# Patient Record
Sex: MALE | Race: WHITE | URBAN - METROPOLITAN AREA
[De-identification: names, ages, dates, MRNs, and addresses within clinical notes are randomized per-mention and may not be internally consistent; named-entity substitution may affect disease eponyms.]

---

## 2017-01-01 ENCOUNTER — TELEPHONE (OUTPATIENT)
Dept: INTERNAL MEDICINE | Facility: CLINIC | Age: 52
End: 2017-01-01

## 2017-01-01 ENCOUNTER — OFFICE VISIT (OUTPATIENT)
Dept: INTERNAL MEDICINE | Facility: CLINIC | Age: 52
End: 2017-01-01
Payer: MEDICARE

## 2017-01-01 ENCOUNTER — ANTICOAGULATION THERAPY VISIT (OUTPATIENT)
Dept: NURSING | Facility: CLINIC | Age: 52
End: 2017-01-01
Payer: MEDICARE

## 2017-01-01 ENCOUNTER — OFFICE VISIT (OUTPATIENT)
Dept: PODIATRY | Facility: CLINIC | Age: 52
End: 2017-01-01
Payer: MEDICARE

## 2017-01-01 ENCOUNTER — TELEPHONE (OUTPATIENT)
Dept: FAMILY MEDICINE | Facility: CLINIC | Age: 52
End: 2017-01-01

## 2017-01-01 ENCOUNTER — NURSE TRIAGE (OUTPATIENT)
Dept: NURSING | Facility: CLINIC | Age: 52
End: 2017-01-01

## 2017-01-01 ENCOUNTER — ALLIED HEALTH/NURSE VISIT (OUTPATIENT)
Dept: FAMILY MEDICINE | Facility: CLINIC | Age: 52
End: 2017-01-01
Payer: MEDICARE

## 2017-01-01 ENCOUNTER — OFFICE VISIT (OUTPATIENT)
Dept: URGENT CARE | Facility: URGENT CARE | Age: 52
End: 2017-01-01
Payer: MEDICARE

## 2017-01-01 ENCOUNTER — TRANSFERRED RECORDS (OUTPATIENT)
Dept: HEALTH INFORMATION MANAGEMENT | Facility: CLINIC | Age: 52
End: 2017-01-01

## 2017-01-01 ENCOUNTER — RADIANT APPOINTMENT (OUTPATIENT)
Dept: GENERAL RADIOLOGY | Facility: CLINIC | Age: 52
End: 2017-01-01
Attending: INTERNAL MEDICINE
Payer: MEDICARE

## 2017-01-01 ENCOUNTER — CARE COORDINATION (OUTPATIENT)
Dept: CARE COORDINATION | Facility: CLINIC | Age: 52
End: 2017-01-01

## 2017-01-01 ENCOUNTER — MEDICAL CORRESPONDENCE (OUTPATIENT)
Dept: HEALTH INFORMATION MANAGEMENT | Facility: CLINIC | Age: 52
End: 2017-01-01

## 2017-01-01 ENCOUNTER — ALLIED HEALTH/NURSE VISIT (OUTPATIENT)
Dept: NURSING | Facility: CLINIC | Age: 52
End: 2017-01-01
Payer: MEDICARE

## 2017-01-01 VITALS
BODY MASS INDEX: 37.42 KG/M2 | OXYGEN SATURATION: 94 % | TEMPERATURE: 96.9 F | DIASTOLIC BLOOD PRESSURE: 67 MMHG | HEART RATE: 85 BPM | SYSTOLIC BLOOD PRESSURE: 96 MMHG | WEIGHT: 191.6 LBS

## 2017-01-01 VITALS
TEMPERATURE: 97.1 F | DIASTOLIC BLOOD PRESSURE: 62 MMHG | HEART RATE: 71 BPM | BODY MASS INDEX: 34.92 KG/M2 | SYSTOLIC BLOOD PRESSURE: 95 MMHG | OXYGEN SATURATION: 92 % | WEIGHT: 178.8 LBS

## 2017-01-01 VITALS — SYSTOLIC BLOOD PRESSURE: 116 MMHG | HEART RATE: 80 BPM | DIASTOLIC BLOOD PRESSURE: 70 MMHG

## 2017-01-01 VITALS — DIASTOLIC BLOOD PRESSURE: 60 MMHG | SYSTOLIC BLOOD PRESSURE: 96 MMHG | HEART RATE: 66 BPM

## 2017-01-01 VITALS
HEART RATE: 79 BPM | SYSTOLIC BLOOD PRESSURE: 73 MMHG | TEMPERATURE: 97 F | OXYGEN SATURATION: 88 % | DIASTOLIC BLOOD PRESSURE: 50 MMHG

## 2017-01-01 VITALS
BODY MASS INDEX: 39.22 KG/M2 | SYSTOLIC BLOOD PRESSURE: 91 MMHG | DIASTOLIC BLOOD PRESSURE: 62 MMHG | WEIGHT: 200.8 LBS | TEMPERATURE: 94.8 F | HEART RATE: 83 BPM | OXYGEN SATURATION: 88 %

## 2017-01-01 VITALS
OXYGEN SATURATION: 93 % | DIASTOLIC BLOOD PRESSURE: 68 MMHG | BODY MASS INDEX: 35.04 KG/M2 | SYSTOLIC BLOOD PRESSURE: 95 MMHG | HEART RATE: 81 BPM | WEIGHT: 179.4 LBS

## 2017-01-01 VITALS
SYSTOLIC BLOOD PRESSURE: 112 MMHG | DIASTOLIC BLOOD PRESSURE: 74 MMHG | OXYGEN SATURATION: 90 % | TEMPERATURE: 96.6 F | HEART RATE: 70 BPM | WEIGHT: 172.2 LBS | BODY MASS INDEX: 33.63 KG/M2

## 2017-01-01 VITALS
OXYGEN SATURATION: 95 % | BODY MASS INDEX: 34.1 KG/M2 | WEIGHT: 174.6 LBS | TEMPERATURE: 96.9 F | HEIGHT: 60 IN | DIASTOLIC BLOOD PRESSURE: 60 MMHG | BODY MASS INDEX: 34.36 KG/M2 | HEART RATE: 90 BPM | WEIGHT: 175 LBS | SYSTOLIC BLOOD PRESSURE: 96 MMHG | HEART RATE: 79 BPM

## 2017-01-01 VITALS
HEART RATE: 67 BPM | DIASTOLIC BLOOD PRESSURE: 84 MMHG | OXYGEN SATURATION: 95 % | TEMPERATURE: 96.2 F | SYSTOLIC BLOOD PRESSURE: 138 MMHG

## 2017-01-01 VITALS
SYSTOLIC BLOOD PRESSURE: 91 MMHG | TEMPERATURE: 96.8 F | OXYGEN SATURATION: 88 % | DIASTOLIC BLOOD PRESSURE: 60 MMHG | HEART RATE: 78 BPM | BODY MASS INDEX: 35.35 KG/M2 | WEIGHT: 181 LBS

## 2017-01-01 VITALS
DIASTOLIC BLOOD PRESSURE: 59 MMHG | OXYGEN SATURATION: 95 % | WEIGHT: 195 LBS | SYSTOLIC BLOOD PRESSURE: 97 MMHG | HEART RATE: 87 BPM | BODY MASS INDEX: 38.08 KG/M2 | TEMPERATURE: 96.7 F

## 2017-01-01 VITALS — HEART RATE: 80 BPM | OXYGEN SATURATION: 90 % | WEIGHT: 172 LBS | BODY MASS INDEX: 33.59 KG/M2

## 2017-01-01 VITALS
HEART RATE: 82 BPM | BODY MASS INDEX: 33.98 KG/M2 | WEIGHT: 174 LBS | DIASTOLIC BLOOD PRESSURE: 74 MMHG | SYSTOLIC BLOOD PRESSURE: 114 MMHG

## 2017-01-01 VITALS
WEIGHT: 176.2 LBS | BODY MASS INDEX: 34.41 KG/M2 | DIASTOLIC BLOOD PRESSURE: 66 MMHG | HEART RATE: 86 BPM | TEMPERATURE: 96.7 F | SYSTOLIC BLOOD PRESSURE: 100 MMHG

## 2017-01-01 DIAGNOSIS — J96.22 ACUTE ON CHRONIC RESPIRATORY FAILURE WITH HYPOXIA AND HYPERCAPNIA (H): Primary | ICD-10-CM

## 2017-01-01 DIAGNOSIS — I26.99 PULMONARY EMBOLISM AND INFARCTION (H): ICD-10-CM

## 2017-01-01 DIAGNOSIS — Z79.01 LONG-TERM (CURRENT) USE OF ANTICOAGULANTS: ICD-10-CM

## 2017-01-01 DIAGNOSIS — I27.20 PULMONARY HYPERTENSION (H): Chronic | ICD-10-CM

## 2017-01-01 DIAGNOSIS — I27.20 PULMONARY HYPERTENSION (H): ICD-10-CM

## 2017-01-01 DIAGNOSIS — L97.929 VENOUS ULCER OF LEFT LEG (H): ICD-10-CM

## 2017-01-01 DIAGNOSIS — J96.22 ACUTE ON CHRONIC RESPIRATORY FAILURE WITH HYPOXIA AND HYPERCAPNIA (H): ICD-10-CM

## 2017-01-01 DIAGNOSIS — Z79.01 LONG TERM (CURRENT) USE OF ANTICOAGULANTS: ICD-10-CM

## 2017-01-01 DIAGNOSIS — Z79.01 LONG-TERM (CURRENT) USE OF ANTICOAGULANTS: Primary | ICD-10-CM

## 2017-01-01 DIAGNOSIS — E66.2 HYPOVENTILATION ASSOCIATED WITH OBESITY (H): ICD-10-CM

## 2017-01-01 DIAGNOSIS — I50.810 RIGHT-SIDED HEART FAILURE (H): ICD-10-CM

## 2017-01-01 DIAGNOSIS — J96.21 ACUTE ON CHRONIC RESPIRATORY FAILURE WITH HYPOXIA AND HYPERCAPNIA (H): ICD-10-CM

## 2017-01-01 DIAGNOSIS — I50.810 RIGHT-SIDED HEART FAILURE (H): Primary | ICD-10-CM

## 2017-01-01 DIAGNOSIS — I26.99 RECURRENT PULMONARY EMBOLISM (H): ICD-10-CM

## 2017-01-01 DIAGNOSIS — L97.909 VENOUS STASIS ULCERS, UNSPECIFIED LATERALITY: ICD-10-CM

## 2017-01-01 DIAGNOSIS — J40 BRONCHITIS: Primary | ICD-10-CM

## 2017-01-01 DIAGNOSIS — J47.9 ACQUIRED BRONCHIECTASIS (H): ICD-10-CM

## 2017-01-01 DIAGNOSIS — K21.9 GASTROESOPHAGEAL REFLUX DISEASE, ESOPHAGITIS PRESENCE NOT SPECIFIED: Chronic | ICD-10-CM

## 2017-01-01 DIAGNOSIS — Q90.9 TRISOMY 21 SYNDROME: Chronic | ICD-10-CM

## 2017-01-01 DIAGNOSIS — R79.1 ELEVATED INR: Primary | ICD-10-CM

## 2017-01-01 DIAGNOSIS — I89.0 LYMPHEDEMA: ICD-10-CM

## 2017-01-01 DIAGNOSIS — I48.20 CHRONIC ATRIAL FIBRILLATION (H): ICD-10-CM

## 2017-01-01 DIAGNOSIS — I10 HYPERTENSION, BENIGN: Primary | Chronic | ICD-10-CM

## 2017-01-01 DIAGNOSIS — E66.2 HYPOVENTILATION ASSOCIATED WITH OBESITY (H): Chronic | ICD-10-CM

## 2017-01-01 DIAGNOSIS — I50.23 ACUTE ON CHRONIC SYSTOLIC HEART FAILURE (H): Primary | ICD-10-CM

## 2017-01-01 DIAGNOSIS — B35.1 DERMATOPHYTOSIS OF NAIL: ICD-10-CM

## 2017-01-01 DIAGNOSIS — G47.33 OBSTRUCTIVE SLEEP APNEA: Chronic | ICD-10-CM

## 2017-01-01 DIAGNOSIS — L84 CORNS AND CALLOSITIES: Primary | ICD-10-CM

## 2017-01-01 DIAGNOSIS — I50.33 ACUTE ON CHRONIC DIASTOLIC CONGESTIVE HEART FAILURE (H): Primary | ICD-10-CM

## 2017-01-01 DIAGNOSIS — E78.5 HYPERLIPIDEMIA LDL GOAL <130: Chronic | ICD-10-CM

## 2017-01-01 DIAGNOSIS — I83.029 VENOUS ULCER OF LEFT LEG (H): ICD-10-CM

## 2017-01-01 DIAGNOSIS — R06.89 DECREASED LUNG SOUNDS: ICD-10-CM

## 2017-01-01 DIAGNOSIS — D69.6 THROMBOCYTOPENIA (H): ICD-10-CM

## 2017-01-01 DIAGNOSIS — I83.009 VENOUS STASIS ULCERS, UNSPECIFIED LATERALITY: ICD-10-CM

## 2017-01-01 DIAGNOSIS — D68.59 PRIMARY HYPERCOAGULABLE STATE (H): ICD-10-CM

## 2017-01-01 DIAGNOSIS — I10 HYPERTENSION, BENIGN: Chronic | ICD-10-CM

## 2017-01-01 DIAGNOSIS — R60.0 BILATERAL LOWER EXTREMITY EDEMA: Primary | ICD-10-CM

## 2017-01-01 DIAGNOSIS — Z01.30 BP CHECK: Primary | ICD-10-CM

## 2017-01-01 DIAGNOSIS — J47.9 ACQUIRED BRONCHIECTASIS (H): Chronic | ICD-10-CM

## 2017-01-01 DIAGNOSIS — R79.1 SUPRATHERAPEUTIC INR: Primary | ICD-10-CM

## 2017-01-01 DIAGNOSIS — R17 ELEVATED BILIRUBIN: ICD-10-CM

## 2017-01-01 DIAGNOSIS — Z79.01 LONG-TERM (CURRENT) USE OF ANTICOAGULANTS, INR GOAL 2.0-3.0: ICD-10-CM

## 2017-01-01 DIAGNOSIS — Z99.81 DEPENDENCE ON SUPPLEMENTAL OXYGEN: ICD-10-CM

## 2017-01-01 DIAGNOSIS — M10.00 IDIOPATHIC GOUT, UNSPECIFIED CHRONICITY, UNSPECIFIED SITE: ICD-10-CM

## 2017-01-01 DIAGNOSIS — J96.21 ACUTE ON CHRONIC RESPIRATORY FAILURE WITH HYPOXIA AND HYPERCAPNIA (H): Primary | ICD-10-CM

## 2017-01-01 DIAGNOSIS — D69.6 THROMBOCYTOPENIA (H): Primary | ICD-10-CM

## 2017-01-01 DIAGNOSIS — I50.23 ACUTE ON CHRONIC SYSTOLIC HEART FAILURE (H): ICD-10-CM

## 2017-01-01 DIAGNOSIS — Z99.81 ON SUPPLEMENTAL OXYGEN THERAPY: ICD-10-CM

## 2017-01-01 DIAGNOSIS — R79.1 ELEVATED INR: ICD-10-CM

## 2017-01-01 DIAGNOSIS — I82.499 DEEP VEIN THROMBOSIS (DVT) OF OTHER VEIN OF LOWER EXTREMITY: Chronic | ICD-10-CM

## 2017-01-01 DIAGNOSIS — I95.9 HYPOTENSION, UNSPECIFIED HYPOTENSION TYPE: ICD-10-CM

## 2017-01-01 LAB
ALBUMIN SERPL-MCNC: 2.1 G/DL (ref 3.4–5)
ALBUMIN SERPL-MCNC: 2.2 G/DL (ref 3.4–5)
ALP SERPL-CCNC: 222 U/L (ref 40–150)
ALP SERPL-CCNC: 223 U/L (ref 40–150)
ALT SERPL W P-5'-P-CCNC: 15 U/L (ref 0–70)
ALT SERPL W P-5'-P-CCNC: 16 U/L (ref 0–70)
ALT SERPL W P-5'-P-CCNC: 20 U/L (ref 0–70)
ANION GAP SERPL CALCULATED.3IONS-SCNC: 5 MMOL/L (ref 3–14)
ANION GAP SERPL CALCULATED.3IONS-SCNC: 7 MMOL/L (ref 3–14)
ANION GAP SERPL CALCULATED.3IONS-SCNC: 8 MMOL/L (ref 3–14)
ANION GAP SERPL CALCULATED.3IONS-SCNC: 8 MMOL/L (ref 3–14)
ANISOCYTOSIS BLD QL SMEAR: ABNORMAL
AST SERPL W P-5'-P-CCNC: 27 U/L (ref 0–45)
AST SERPL W P-5'-P-CCNC: 28 U/L (ref 0–45)
BILIRUB DIRECT SERPL-MCNC: 0.9 MG/DL (ref 0–0.2)
BILIRUB DIRECT SERPL-MCNC: 1.1 MG/DL (ref 0–0.2)
BILIRUB SERPL-MCNC: 1.6 MG/DL (ref 0.2–1.3)
BILIRUB SERPL-MCNC: 1.8 MG/DL (ref 0.2–1.3)
BUN SERPL-MCNC: 26 MG/DL (ref 7–30)
BUN SERPL-MCNC: 26 MG/DL (ref 7–30)
BUN SERPL-MCNC: 29 MG/DL (ref 7–30)
BUN SERPL-MCNC: 30 MG/DL (ref 7–30)
BUN SERPL-MCNC: 32 MG/DL (ref 7–30)
BUN SERPL-MCNC: 34 MG/DL (ref 7–30)
CALCIUM SERPL-MCNC: 7.9 MG/DL (ref 8.5–10.1)
CALCIUM SERPL-MCNC: 8 MG/DL (ref 8.5–10.1)
CALCIUM SERPL-MCNC: 8.5 MG/DL (ref 8.5–10.1)
CALCIUM SERPL-MCNC: 8.5 MG/DL (ref 8.5–10.1)
CALCIUM SERPL-MCNC: 8.6 MG/DL (ref 8.5–10.1)
CALCIUM SERPL-MCNC: 9 MG/DL (ref 8.5–10.1)
CHLORIDE SERPL-SCNC: 100 MMOL/L (ref 94–109)
CHLORIDE SERPL-SCNC: 99 MMOL/L (ref 94–109)
CO2 SERPL-SCNC: 27 MMOL/L (ref 20–32)
CO2 SERPL-SCNC: 30 MMOL/L (ref 20–32)
CO2 SERPL-SCNC: 33 MMOL/L (ref 20–32)
CO2 SERPL-SCNC: 35 MMOL/L (ref 20–32)
CREAT SERPL-MCNC: 1.32 MG/DL (ref 0.66–1.25)
CREAT SERPL-MCNC: 1.36 MG/DL (ref 0.66–1.25)
CREAT SERPL-MCNC: 1.47 MG/DL (ref 0.66–1.25)
CREAT SERPL-MCNC: 1.5 MG/DL (ref 0.72–1.25)
CREAT SERPL-MCNC: 1.53 MG/DL (ref 0.66–1.25)
CREAT SERPL-MCNC: 1.55 MG/DL (ref 0.66–1.25)
CREAT SERPL-MCNC: 1.58 MG/DL (ref 0.66–1.25)
DIFFERENTIAL METHOD BLD: ABNORMAL
EJECTION FRACTION: 65
EOSINOPHIL # BLD AUTO: 0.1 10E9/L (ref 0–0.7)
EOSINOPHIL NFR BLD AUTO: 1 %
ERYTHROCYTE [DISTWIDTH] IN BLOOD BY AUTOMATED COUNT: 23.4 % (ref 10–15)
ERYTHROCYTE [DISTWIDTH] IN BLOOD BY AUTOMATED COUNT: 24 % (ref 10–15)
GFR SERPL CREATININE-BSD FRML MDRD: 46 ML/MIN/1.7M2
GFR SERPL CREATININE-BSD FRML MDRD: 47 ML/MIN/1.7M2
GFR SERPL CREATININE-BSD FRML MDRD: 48 ML/MIN/1.7M2
GFR SERPL CREATININE-BSD FRML MDRD: 49 ML/MIN/1.73M2
GFR SERPL CREATININE-BSD FRML MDRD: 50 ML/MIN/1.7M2
GFR SERPL CREATININE-BSD FRML MDRD: 55 ML/MIN/1.7M2
GFR SERPL CREATININE-BSD FRML MDRD: 57 ML/MIN/1.7M2
GLUCOSE SERPL-MCNC: 62 MG/DL (ref 70–99)
GLUCOSE SERPL-MCNC: 75 MG/DL (ref 70–99)
GLUCOSE SERPL-MCNC: 78 MG/DL (ref 70–99)
GLUCOSE SERPL-MCNC: 81 MG/DL (ref 65–100)
GLUCOSE SERPL-MCNC: 82 MG/DL (ref 70–99)
GLUCOSE SERPL-MCNC: 82 MG/DL (ref 70–99)
GLUCOSE SERPL-MCNC: 96 MG/DL (ref 70–99)
HCT VFR BLD AUTO: 42.9 % (ref 40–53)
HCT VFR BLD AUTO: 44.6 % (ref 40–53)
HGB BLD-MCNC: 13.7 G/DL (ref 13.3–17.7)
HGB BLD-MCNC: 14.2 G/DL (ref 13.3–17.7)
INR POINT OF CARE: 1.8 (ref 0.86–1.14)
INR POINT OF CARE: 1.9 (ref 0.86–1.14)
INR POINT OF CARE: 2 (ref 0.86–1.14)
INR POINT OF CARE: 2.1 (ref 0.86–1.14)
INR POINT OF CARE: 2.4 (ref 0.86–1.14)
INR POINT OF CARE: 2.5 (ref 0.86–1.14)
INR POINT OF CARE: 2.7 (ref 0.86–1.14)
INR POINT OF CARE: 2.9 (ref 0.86–1.14)
INR POINT OF CARE: 3 (ref 0.86–1.14)
INR POINT OF CARE: 3.1 (ref 0.86–1.14)
INR POINT OF CARE: 3.1 (ref 0.86–1.14)
INR POINT OF CARE: 3.2 (ref 0.86–1.14)
INR POINT OF CARE: 3.2 (ref 0.86–1.14)
INR POINT OF CARE: 3.3 (ref 0.86–1.14)
INR POINT OF CARE: 3.4 (ref 0.86–1.14)
INR POINT OF CARE: 3.5 (ref 0.86–1.14)
INR POINT OF CARE: 3.7 (ref 0.86–1.14)
INR POINT OF CARE: 3.8 (ref 0.86–1.14)
INR POINT OF CARE: 3.8 (ref 0.86–1.14)
INR POINT OF CARE: 4.5 (ref 0.86–1.14)
INR POINT OF CARE: 7 (ref 0.86–1.14)
INR POINT OF CARE: 9.14 (ref 0.86–1.14)
INR POINT OF CARE: NORMAL (ref 0.86–1.14)
INR PPP: 3
INR PPP: 9.14 (ref 0.86–1.14)
LDLC SERPL DIRECT ASSAY-MCNC: 54 MG/DL
LYMPHOCYTES # BLD AUTO: 1 10E9/L (ref 0.8–5.3)
LYMPHOCYTES NFR BLD AUTO: 14 %
MACROCYTES BLD QL SMEAR: PRESENT
MCH RBC QN AUTO: 30.1 PG (ref 26.5–33)
MCH RBC QN AUTO: 30.6 PG (ref 26.5–33)
MCHC RBC AUTO-ENTMCNC: 31.8 G/DL (ref 31.5–36.5)
MCHC RBC AUTO-ENTMCNC: 31.9 G/DL (ref 31.5–36.5)
MCV RBC AUTO: 95 FL (ref 78–100)
MCV RBC AUTO: 96 FL (ref 78–100)
MONOCYTES # BLD AUTO: 0.6 10E9/L (ref 0–1.3)
MONOCYTES NFR BLD AUTO: 9 %
NEUTROPHILS # BLD AUTO: 5.4 10E9/L (ref 1.6–8.3)
NEUTROPHILS NFR BLD AUTO: 76 %
NT-PROBNP SERPL-MCNC: 2233 PG/ML (ref 0–125)
NT-PROBNP SERPL-MCNC: 2353 PG/ML (ref 0–125)
NT-PROBNP SERPL-MCNC: 2695 PG/ML (ref 0–125)
NT-PROBNP SERPL-MCNC: 3061 PG/ML (ref 0–125)
NT-PROBNP SERPL-MCNC: 3557 PG/ML (ref 0–125)
PLATELET # BLD AUTO: 117 10E9/L (ref 150–450)
PLATELET # BLD AUTO: 121 10E9/L (ref 150–450)
POLYCHROMASIA BLD QL SMEAR: ABNORMAL
POTASSIUM SERPL-SCNC: 3.6 MMOL/L (ref 3.4–5.3)
POTASSIUM SERPL-SCNC: 3.8 MMOL/L (ref 3.4–5.3)
POTASSIUM SERPL-SCNC: 3.8 MMOL/L (ref 3.4–5.3)
POTASSIUM SERPL-SCNC: 3.9 MMOL/L (ref 3.5–5)
POTASSIUM SERPL-SCNC: 4 MMOL/L (ref 3.4–5.3)
POTASSIUM SERPL-SCNC: 4.1 MMOL/L (ref 3.4–5.3)
POTASSIUM SERPL-SCNC: 4.5 MMOL/L (ref 3.4–5.3)
PROT SERPL-MCNC: 7.6 G/DL (ref 6.8–8.8)
PROT SERPL-MCNC: 7.7 G/DL (ref 6.8–8.8)
RBC # BLD AUTO: 4.48 10E12/L (ref 4.4–5.9)
RBC # BLD AUTO: 4.71 10E12/L (ref 4.4–5.9)
SODIUM SERPL-SCNC: 134 MMOL/L (ref 133–144)
SODIUM SERPL-SCNC: 136 MMOL/L (ref 133–144)
SODIUM SERPL-SCNC: 137 MMOL/L (ref 133–144)
SODIUM SERPL-SCNC: 137 MMOL/L (ref 133–144)
SODIUM SERPL-SCNC: 139 MMOL/L (ref 133–144)
SODIUM SERPL-SCNC: 139 MMOL/L (ref 133–144)
VARIANT LYMPHS BLD QL SMEAR: PRESENT
WBC # BLD AUTO: 3.7 10E9/L (ref 4–11)
WBC # BLD AUTO: 7.1 10E9/L (ref 4–11)

## 2017-01-01 PROCEDURE — 85610 PROTHROMBIN TIME: CPT | Mod: QW

## 2017-01-01 PROCEDURE — 99207 ZZC NO CHARGE NURSE ONLY: CPT

## 2017-01-01 PROCEDURE — 99213 OFFICE O/P EST LOW 20 MIN: CPT | Performed by: INTERNAL MEDICINE

## 2017-01-01 PROCEDURE — 36416 COLLJ CAPILLARY BLOOD SPEC: CPT

## 2017-01-01 PROCEDURE — 83880 ASSAY OF NATRIURETIC PEPTIDE: CPT | Performed by: INTERNAL MEDICINE

## 2017-01-01 PROCEDURE — 99214 OFFICE O/P EST MOD 30 MIN: CPT | Mod: 25 | Performed by: INTERNAL MEDICINE

## 2017-01-01 PROCEDURE — 85610 PROTHROMBIN TIME: CPT | Performed by: INTERNAL MEDICINE

## 2017-01-01 PROCEDURE — 83721 ASSAY OF BLOOD LIPOPROTEIN: CPT | Performed by: INTERNAL MEDICINE

## 2017-01-01 PROCEDURE — 11721 DEBRIDE NAIL 6 OR MORE: CPT | Mod: 59 | Performed by: PODIATRIST

## 2017-01-01 PROCEDURE — 80048 BASIC METABOLIC PNL TOTAL CA: CPT | Performed by: INTERNAL MEDICINE

## 2017-01-01 PROCEDURE — 85025 COMPLETE CBC W/AUTO DIFF WBC: CPT | Performed by: INTERNAL MEDICINE

## 2017-01-01 PROCEDURE — 99214 OFFICE O/P EST MOD 30 MIN: CPT | Performed by: INTERNAL MEDICINE

## 2017-01-01 PROCEDURE — 85027 COMPLETE CBC AUTOMATED: CPT | Performed by: INTERNAL MEDICINE

## 2017-01-01 PROCEDURE — 99215 OFFICE O/P EST HI 40 MIN: CPT | Performed by: INTERNAL MEDICINE

## 2017-01-01 PROCEDURE — 84460 ALANINE AMINO (ALT) (SGPT): CPT | Performed by: INTERNAL MEDICINE

## 2017-01-01 PROCEDURE — 99207 ZZC NO CHARGE NURSE ONLY: CPT | Performed by: INTERNAL MEDICINE

## 2017-01-01 PROCEDURE — 36415 COLL VENOUS BLD VENIPUNCTURE: CPT | Performed by: INTERNAL MEDICINE

## 2017-01-01 PROCEDURE — 11056 PARNG/CUTG B9 HYPRKR LES 2-4: CPT | Mod: GA | Performed by: PODIATRIST

## 2017-01-01 PROCEDURE — 80076 HEPATIC FUNCTION PANEL: CPT | Performed by: INTERNAL MEDICINE

## 2017-01-01 PROCEDURE — 99214 OFFICE O/P EST MOD 30 MIN: CPT | Performed by: FAMILY MEDICINE

## 2017-01-01 PROCEDURE — 71020 XR CHEST 2 VW: CPT

## 2017-01-01 RX ORDER — PHYTONADIONE 5 MG/1
5 TABLET ORAL ONCE
Qty: 1 TABLET | Refills: 0 | Status: SHIPPED | OUTPATIENT
Start: 2017-01-01 | End: 2017-01-01

## 2017-01-01 RX ORDER — ACETAZOLAMIDE 250 MG/1
250 TABLET ORAL DAILY
Qty: 90 TABLET | Refills: 0 | Status: SHIPPED | OUTPATIENT
Start: 2017-01-01

## 2017-01-01 RX ORDER — SILDENAFIL CITRATE 20 MG/1
20 TABLET ORAL 3 TIMES DAILY
Qty: 180 TABLET | Refills: 1 | Status: SHIPPED | OUTPATIENT
Start: 2017-01-01

## 2017-01-01 RX ORDER — BUMETANIDE 2 MG/1
2 TABLET ORAL 2 TIMES DAILY
Qty: 14 TABLET | Refills: 0 | Status: SHIPPED | OUTPATIENT
Start: 2017-01-01 | End: 2017-01-01

## 2017-01-01 RX ORDER — POTASSIUM CHLORIDE 1.5 G/1.58G
20 POWDER, FOR SOLUTION ORAL 2 TIMES DAILY
Qty: 180 TABLET | Refills: 0 | Status: SHIPPED | OUTPATIENT
Start: 2017-01-01 | End: 2017-01-01

## 2017-01-01 RX ORDER — ACETAZOLAMIDE 250 MG/1
250 TABLET ORAL DAILY
Qty: 7 TABLET | Refills: 0 | Status: SHIPPED | OUTPATIENT
Start: 2017-01-01 | End: 2017-01-01

## 2017-01-01 RX ORDER — SILDENAFIL CITRATE 20 MG/1
TABLET ORAL
Qty: 270 TABLET | Refills: 1 | OUTPATIENT
Start: 2017-01-01

## 2017-01-01 RX ORDER — SILDENAFIL CITRATE 20 MG/1
20 TABLET ORAL 3 TIMES DAILY
Qty: 21 TABLET | Refills: 0 | Status: SHIPPED | OUTPATIENT
Start: 2017-01-01 | End: 2017-01-01

## 2017-01-01 RX ORDER — BUMETANIDE 2 MG/1
TABLET ORAL
Qty: 180 TABLET | Refills: 1 | Status: SHIPPED | OUTPATIENT
Start: 2017-01-01 | End: 2017-01-01

## 2017-01-01 RX ORDER — SIMVASTATIN 20 MG
TABLET ORAL
Qty: 39 TABLET | Refills: 3 | Status: SHIPPED | OUTPATIENT
Start: 2017-01-01

## 2017-01-01 RX ORDER — ALLOPURINOL 300 MG/1
1 TABLET ORAL DAILY
Qty: 7 TABLET | Refills: 0 | Status: SHIPPED | OUTPATIENT
Start: 2017-01-01

## 2017-01-01 RX ORDER — WARFARIN SODIUM 3 MG/1
TABLET ORAL
Qty: 150 TABLET | Refills: 1 | COMMUNITY
Start: 2017-01-01

## 2017-01-01 RX ORDER — OMEPRAZOLE 40 MG/1
CAPSULE, DELAYED RELEASE ORAL
Qty: 90 CAPSULE | Refills: 3 | Status: SHIPPED | OUTPATIENT
Start: 2017-01-01

## 2017-01-01 RX ORDER — WARFARIN SODIUM 3 MG/1
TABLET ORAL
Qty: 90 TABLET | Refills: 0 | Status: SHIPPED | OUTPATIENT
Start: 2017-01-01 | End: 2017-01-01 | Stop reason: DRUGHIGH

## 2017-01-01 RX ORDER — WARFARIN SODIUM 3 MG/1
TABLET ORAL
Qty: 150 TABLET | Refills: 1 | Status: SHIPPED | OUTPATIENT
Start: 2017-01-01 | End: 2017-01-01

## 2017-01-01 RX ORDER — PREDNISONE 20 MG/1
40 TABLET ORAL DAILY
Qty: 10 TABLET | Refills: 0 | Status: SHIPPED | OUTPATIENT
Start: 2017-01-01 | End: 2017-01-01

## 2017-01-01 RX ORDER — WARFARIN SODIUM 3 MG/1
TABLET ORAL
Qty: 14 TABLET | Refills: 0 | Status: SHIPPED | OUTPATIENT
Start: 2017-01-01 | End: 2017-01-01

## 2017-01-01 RX ORDER — POTASSIUM CHLORIDE 1500 MG/1
TABLET, EXTENDED RELEASE ORAL
Qty: 60 TABLET | Refills: 0 | Status: SHIPPED | OUTPATIENT
Start: 2017-01-01

## 2017-01-01 RX ORDER — WARFARIN SODIUM 3 MG/1
TABLET ORAL
Qty: 80 TABLET | Refills: 0 | Status: SHIPPED | OUTPATIENT
Start: 2017-01-01 | End: 2017-01-01

## 2017-01-01 RX ORDER — SIMVASTATIN 20 MG
20 TABLET ORAL
Qty: 3 TABLET | Refills: 0 | Status: SHIPPED | OUTPATIENT
Start: 2017-01-01 | End: 2017-01-01 | Stop reason: DRUGHIGH

## 2017-01-01 RX ORDER — POTASSIUM CHLORIDE 1500 MG/1
TABLET, EXTENDED RELEASE ORAL
Qty: 180 TABLET | Refills: 0 | OUTPATIENT
Start: 2017-01-01

## 2017-01-01 RX ORDER — POTASSIUM CHLORIDE 1.5 G/1.58G
20 POWDER, FOR SOLUTION ORAL 2 TIMES DAILY
Qty: 14 TABLET | Refills: 0 | Status: SHIPPED | OUTPATIENT
Start: 2017-01-01 | End: 2017-01-01

## 2017-01-01 RX ORDER — OMEPRAZOLE 40 MG/1
CAPSULE, DELAYED RELEASE ORAL
Qty: 7 CAPSULE | Refills: 0 | Status: SHIPPED | OUTPATIENT
Start: 2017-01-01 | End: 2017-01-01

## 2017-01-01 RX ORDER — LEVOFLOXACIN 750 MG/1
750 TABLET, FILM COATED ORAL DAILY
Qty: 5 TABLET | Refills: 0 | Status: SHIPPED | OUTPATIENT
Start: 2017-01-01 | End: 2017-01-01 | Stop reason: DRUGHIGH

## 2017-01-01 RX ORDER — WARFARIN SODIUM 3 MG/1
TABLET ORAL
Qty: 30 TABLET | Refills: 0 | Status: SHIPPED | OUTPATIENT
Start: 2017-01-01 | End: 2017-01-01

## 2017-01-01 RX ORDER — AMOXICILLIN 500 MG/1
CAPSULE ORAL
Qty: 21 CAPSULE | Refills: 0 | Status: SHIPPED | OUTPATIENT
Start: 2017-01-01 | End: 2017-01-01

## 2017-01-01 RX ORDER — LEVOFLOXACIN 250 MG/1
250 TABLET, FILM COATED ORAL DAILY
Qty: 7 TABLET | Refills: 0 | Status: SHIPPED | OUTPATIENT
Start: 2017-01-01 | End: 2017-01-01

## 2017-01-01 RX ORDER — AMOXICILLIN 500 MG/1
CAPSULE ORAL
Qty: 42 CAPSULE | Refills: 1 | Status: SHIPPED | OUTPATIENT
Start: 2017-01-01

## 2017-01-01 RX ORDER — BUMETANIDE 2 MG/1
2 TABLET ORAL 2 TIMES DAILY
Qty: 180 TABLET | Refills: 1
Start: 2017-01-01

## 2017-01-04 NOTE — PROGRESS NOTES
S:  Patient here for routine care and signed the ABN before seeing us today.    O:  On exam, all the nails are thickened, elongated, discolored, with subungual debris.  Bilateral hallux IPJ calluses     A:  Onychomycosis       Calluses x 2    P:  ABN signed.  Nails debrided.  Calluses debrided with a 15 blade.  RETURN TO CLINIC 3 months    JIMMY YUN DPM, FACFAS

## 2017-01-04 NOTE — PATIENT INSTRUCTIONS
We wish you continued good healing. If you have any questions or concerns, please do not hesitate to contact us at [phone].      Please remember to call and schedule a follow up appointment if one was recommended at your earliest convenience.   PODIATRY CLINIC HOURS  TELEPHONE NUMBER    Dr. Robert Webster D.P.M [facility]    Clinics:  [facility]        Briana Dyson MA  Medical Assistant  Tuesday 1PM-6PM  [facility]  Wednesday 7AM-2PM  [place]  Thursday 10AM-6PM  [place] Friday 7AM-345PM  [place]  Specialty schedulers:   [phone] to make an appointment with any Specialty Provider.        Urgent Care locations:    [facility] Monday-Friday 5 pm - 9 pm. Saturday-Sunday 9 am -5pm    Monday-Friday 11 am - 9 pm Saturday 9 am - 5 pm     Monday-Sunday 12 noon-8PM [phone]     If you need a medication refill, please contact us you may need lab work and/or a follow up visit prior to your refill (i.e. Antifungal medications).    [employer] (secure e-mail communication and access to your chart) to send a message or to make an appointment.    If MRI needed please call Aaron Martin at [phone]        Weight management plan: Patient was referred to their PCP to discuss a diet and exercise plan.

## 2017-01-04 NOTE — NURSING NOTE
Chief Complaint   Patient presents with     Toenail     due for trim       Initial Pulse 90  Ht 1.524 m (5')  Wt 79.379 kg (175 lb)  BMI 34.18 kg/m2  SpO2  Estimated body mass index is 34.18 kg/(m^2) as calculated from the following:    Height as of this encounter: 1.524 m (5').    Weight as of this encounter: 79.379 kg (175 lb).  BP completed using cuff size: NA (Not Taken)

## 2017-01-11 PROBLEM — Z01.30 BP CHECK: Status: ACTIVE | Noted: 2017-01-01

## 2017-01-11 NOTE — PROGRESS NOTES
Sonny Vivar was evaluated in a [facility] on January 11, 2017 at which time his blood pressure was:    BP Readings from Last 3 Encounters:   01/11/17 96/60   12/28/16 96/66   12/19/16 85/51       Reviewed lifestyle modifications for blood pressure control and reduction: including making healthy food choices, managing weight, getting regular exercise, smoking cessation, reducing alcohol consumption, monitoring blood pressure regularly.     Sonny Vivar is not experiencing symptoms.    Follow-Up: BP is at goal of < 140/90mmHg (patient 18+ years of age with or without diabetes).  Recommended follow-up at pharmacy in 6 months.     Completed by:  Jp Lee RPh.  [facility]  [phone]

## 2017-01-11 NOTE — MR AVS SNAPSHOT
After Visit Summary   1/11/2017    Sonny Vivar    MRN: [id number]           Patient Information     Date Of Birth          1965        Visit Information        Provider Department      1/11/2017 3:37 PM Genna Balderas MD [facility]        Today's Diagnoses     BP check    -  1       Follow-ups after your visit        Your next 10 appointments already scheduled     Feb 22, 2017  2:20 PM CST   Office Visit with Genna Balderas MD   [facility] ([facility])    [address] [address] [phone]           Bring a current list of meds and any records pertaining to this visit.  For Physicals, please bring immunization records and any forms needing to be filled out.  Please arrive 10 minutes early to complete paperwork.            Mar 08, 2017  2:15 PM CST   Anticoagulation Visit with AN ANTI COAG   [facility] ([facility])    [address] [address] [phone]              Who to contact     If you have questions or need follow up information about today's clinic visit or your schedule please contact [facility] directly at [phone].  Normal or non-critical lab and imaging results will be communicated to you by MyChart, letter or phone within 4 business days after the clinic has received the results. If you do not hear from us within 7 days, please contact the clinic through [employer] or phone. If you have a critical or abnormal lab result, we will notify you by phone as soon as possible.  Submit refill requests through [employer] or call your pharmacy and they will forward the refill request to us. Please allow 3 business days for your refill to be completed.          Additional Information About Your Visit        [employer] Information     [employer] gives you secure access to your electronic health record. If you see a primary care provider, you can  also send messages to your care team and make appointments. If you have questions, please call your primary care clinic.  If you do not have a primary care provider, please call [phone] and they will assist you.        Care EveryWhere ID     This is your Care EveryWhere ID. This could be used by other organizations to access your [facility] medical records  [id number]        Your Vitals Were     Pulse                   66            Blood Pressure from Last 3 Encounters:   02/15/17 116/70   02/01/17 114/74   01/19/17 96/60    Weight from Last 3 Encounters:   02/01/17 174 lb (78.9 kg)   01/19/17 174 lb 9.6 oz (79.2 kg)   01/04/17 175 lb (79.4 kg)              Today, you had the following     No orders found for display       Primary Care Provider Office Phone # Fax #    Genna Balderas [phone]       [facility] [address] [address] [address] [address]        Thank you!     Thank you for choosing [facility]  for your care. Our goal is always to provide you with excellent care. Hearing back from our patients is one way we can continue to improve our services. Please take a few minutes to complete the written survey that you may receive in the mail after your visit with us. Thank you!             Your Updated Medication List - Protect others around you: Learn how to safely use, store and throw away your medicines at www.disposemymeds.org.          This list is accurate as of: 1/11/17 11:59 PM.  Always use your most recent med list.                   Brand Name Dispense Instructions for use    acetaminophen 325 MG tablet    TYLENOL     Take 325-650 mg by mouth every 6 hours as needed for mild pain       acetaZOLAMIDE 250 MG tablet    DIAMOX    90 tablet    Take 1 tablet (250 mg) by mouth daily       allopurinol 300 MG tablet    ZYLOPRIM    90 tablet    Take 1 tablet (300 mg) by mouth daily       amoxicillin 500 MG capsule    AMOXIL    42 capsule    TAKE ONE  CAPSULE BY MOUTH THREE TIMES DAILY. TWO WEEKS ON THEN TWO WEEKS OFF       bumetanide 2 MG tablet    BUMEX    180 tablet    Take 1 tablet (2 mg) by mouth 2 times daily       ipratropium - albuterol 0.5 mg/2.5 mg/3 mL 0.5-2.5 (3) MG/3ML neb solution    DUONEB    360 mL    INHALE 1 VIAL BY NEBULIZATION EVERY 6 HOURS AS NEEDED. USE FOR INCREASED COUGH AND/OR CONGESTION       JOBST ANTI-EM KNEE LENGTH MED Misc     4 each    1 Device daily. Pressure of 15-20.       Multi-vitamin Tabs tablet      Take 2 tablets by mouth daily       nebulizer/adult mask Kit     1 kit    1 Units 4 times daily.       omeprazole 40 MG capsule    priLOSEC    90 capsule    TAKE ONE CAPSULE BY MOUTH DAILY. TAKE 30-60 MINUTES BEFORE A MEAL       * order for DME     1 Device    Dispense one handheld pulse oximeter for home use.  Check oxygen saturation daily.  Notify primary MD clinic if oxygen regularly <90% or <88% at any time, and increase supplemental oyxgen.       * order for DME     1 Units    Equipment being ordered: Tubing for nebulizer and accessories.       * order for DME      3 L continuous.       order for DME     2 Units    Equipment being ordered:1 pair (2 units) of knee high compression stockings, 20-30mm Hg, to be worn in the daytime only on both legs, with the toes open.       potassium chloride 20 MEQ Packet    KLOR-CON    180 tablet    Take 20 mEq by mouth 2 times daily dispense tablets not packets.       sildenafil 20 MG tablet    REVATIO/VIAGRA    45 tablet    Take 1 tablet (20 mg) by mouth 3 times daily Per pt sister, hospital instruction take 0.5 tab 3 times daily.       simvastatin 20 MG tablet    ZOCOR    36 tablet    Take 1 tablet (20 mg) by mouth three times a week       warfarin 3 MG tablet    COUMADIN    30 tablet    TAKE 1 TABLET BY MOUTH DAILY OR AS DIRECTED BY INR NURSE OR DOCTOR       * Notice:  This list has 3 medication(s) that are the same as other medications prescribed for you. Read the directions carefully,  and ask your doctor or other care provider to review them with you.

## 2017-01-11 NOTE — Clinical Note
Routing message to PCP for review -BP checked at pharmacy and noted to be at goal. Care giver asked to have BP results sent to PCP.  Jp Lee RP. [facility] [phone]

## 2017-01-11 NOTE — MR AVS SNAPSHOT
Sonny Vivar   1/11/2017 3:00 PM   Anticoagulation Therapy Visit    Description:  51 year old male   Provider:  AN ANTI COAG   Department:  An Nurse           INR as of 1/11/2017     Selected INR 3.8! (1/11/2017)      Anticoagulation Summary as of 1/11/2017     INR goal 2.0-3.0   Selected INR 3.8! (1/11/2017)   Full instructions 1/11: Hold; Otherwise 3 mg every day   Next INR check 1/19/2017    Indications   Pulmonary embolism and infarction (H) [I26.99]  Long-term (current) use of anticoagulants [Z79.01] [Z79.01]         Your next Anticoagulation Clinic appointment(s)     Jan 19, 2017  2:00 PM   [facility]    [address] [facility] [address] [phone]              Contact Numbers     [facility]  Please call  [phone] to cancel and/or reschedule your appointment, or with any problems or questions regarding your therapy.        January 2017 Details    Sun Mon Tue Wed Thu Fri Sat     1               2               3               4               5               6               7                 8               9               10               11      Hold   See details      12      3 mg         13      3 mg         14      3 mg           15      3 mg         16      3 mg         17      3 mg         18      3 mg         19            20               21                 22               23               24               25               26               27               28                 29               30               31                    Date Details   01/11 This INR check       Date of next INR:  1/19/2017         How to take your warfarin dose     To take:  3 mg Take 1 of the 3 mg tablets.    Hold Do not take your warfarin dose. See the Details table to the right for additional instructions.

## 2017-01-11 NOTE — PROGRESS NOTES
ANTICOAGULATION FOLLOW-UP CLINIC VISIT    Patient Name:  Sonny Vivar  Date:  1/11/2017  Contact Type:  Face to Face    SUBJECTIVE:     Patient Findings     Comments INR 3.8. Sister reports he did get a double dose when staying with someone else a week ago. She thinks they held the dose the next day but is not sure. Sister also reports he has not wanted to eat salads recently so may have had less vit k in diet.  No other changes , illness or new concerns. Will hold dose today and recheck in next week when in clinic seeing provider.           OBJECTIVE    INR PROTIME   Date Value Ref Range Status   01/11/2017 3.8* 0.86 - 1.14 Final   01/11/2017 3.8* 0.86 - 1.14 Final       ASSESSMENT / PLAN  INR assessment SUPRA    Recheck INR In: 8 DAYS    INR Location Clinic      Anticoagulation Summary as of 1/11/2017     INR goal 2.0-3.0   Selected INR 3.8! (1/11/2017)   Maintenance plan 3 mg (3 mg x 1) every day   Full instructions 1/11: Hold; Otherwise 3 mg every day   Weekly total 21 mg   Plan last modified Beverley Contreras RN (12/5/2016)   Next INR check 1/19/2017   Target end date     Indications   Pulmonary embolism and infarction (H) [I26.99]  Long-term (current) use of anticoagulants [Z79.01] [Z79.01]         Anticoagulation Episode Summary     INR check location     Preferred lab     Send INR reminders to [facility]    Comments       Anticoagulation Care Providers     Provider Role Specialty Phone number    Wes Camara MD Referring Internal Medicine [phone]    Genna Balderas MD Responsible Internal Medicine [phone]            See the Encounter Report to view Anticoagulation Flowsheet and Dosing Calendar (Go to Encounters tab in chart review, and find the Anticoagulation Therapy Visit)        Beverley Contreras RN

## 2017-01-19 NOTE — PATIENT INSTRUCTIONS
Please see the Cardiologist in 6 months.    You have indicated that you have just undergone a sleep study with (the Pulmonologist) Dr Velazco-we will wait for those results. You have indicated that you have an appointment with Dr Velazco on 2.13.16. Please ask him to forward his clinic notes us.    We will plan on ancillary nurse visits for blood pressure and weight checks every 2 weeks in our clinic    [facility]    Please return to clinic in 1 month.

## 2017-01-19 NOTE — NURSING NOTE
Chief Complaint   Patient presents with     Heart Failure       Initial BP 96/60 mmHg  Pulse 79  Wt 174 lb 9.6 oz (79.198 kg)  SpO2 95% Estimated body mass index is 34.1 kg/(m^2) as calculated from the following:    Height as of 1/4/17: 5' (1.524 m).    Weight as of this encounter: 174 lb 9.6 oz (79.198 kg).  BP completed using cuff size: vida Vergara, CMA

## 2017-01-19 NOTE — MR AVS SNAPSHOT
After Visit Summary   1/19/2017    Sonny Vivar    MRN: [id number]           Patient Information     Date Of Birth          1965        Visit Information        Provider Department      1/19/2017 1:30 PM Genna Balderas MD [facility]        Today's Diagnoses     Acute on chronic systolic heart failure (H)    -  1       Care Instructions       Please see the Cardiologist in 6 months.    You have indicated that you have just undergone a sleep study with (the Pulmonologist) Dr Velazco-we will wait for those results. You have indicated that you have an appointment with Dr Velazco on 2.13.16. Please ask him to forward his clinic notes us.    We will plan on ancillary nurse visits for blood pressure and weight checks every 2 weeks in our clinic    We will let you know your test results by [employer].    Please return to clinic in 1 month.                Follow-ups after your visit        Who to contact     If you have questions or need follow up information about today's clinic visit or your schedule please contact [facility] directly at [phone].  Normal or non-critical lab and imaging results will be communicated to you by [employer], letter or phone within 4 business days after the clinic has received the results. If you do not hear from us within 7 days, please contact the clinic through [employer] or phone. If you have a critical or abnormal lab result, we will notify you by phone as soon as possible.  Submit refill requests through [employer] or call your pharmacy and they will forward the refill request to us. Please allow 3 business days for your refill to be completed.          Additional Information About Your Visit        [employer] Information     [employer] gives you secure access to your electronic health record. If you see a primary care provider, you can also send messages to your care team and make appointments. If you have questions, please call your  primary care clinic.  If you do not have a primary care provider, please call [phone] and they will assist you.        Care EveryWhere ID     This is your Care EveryWhere ID. This could be used by other organizations to access your [facility] medical records  [id number]        Your Vitals Were     Pulse Temperature Pulse Oximetry             79 96.9  F (36.1  C) (Tympanic) 95%          Blood Pressure from Last 3 Encounters:   01/19/17 96/60   01/11/17 96/60   12/28/16 96/66    Weight from Last 3 Encounters:   01/19/17 174 lb 9.6 oz (79.198 kg)   01/04/17 175 lb (79.379 kg)   12/28/16 176 lb (79.833 kg)              We Performed the Following     Basic metabolic panel          Today's Medication Changes          These changes are accurate as of: 1/19/17  2:18 PM.  If you have any questions, ask your nurse or doctor.               These medicines have changed or have updated prescriptions.        Dose/Directions    sildenafil 20 MG tablet   Commonly known as:  REVATIO/VIAGRA   This may have changed:  additional instructions   Used for:  Pulmonary hypertension (H)        Dose:  20 mg   Take 1 tablet (20 mg) by mouth 3 times daily Per pt sister, hospital instruction take 0.5 tab 3 times daily.   Quantity:  45 tablet   Refills:  11                Primary Care Provider Office Phone # Fax #    Genna Balderas [phone]       [facility] [address] [facility] [address]        Thank you!     Thank you for choosing [facility]  for your care. Our goal is always to provide you with excellent care. Hearing back from our patients is one way we can continue to improve our services. Please take a few minutes to complete the written survey that you may receive in the mail after your visit with us. Thank you!             Your Updated Medication List - Protect others around you: Learn how to safely use, store and throw away your medicines at www.disposemymeds.org.           This list is accurate as of: 1/19/17  2:18 PM.  Always use your most recent med list.                   Brand Name Dispense Instructions for use    acetaminophen 325 MG tablet    TYLENOL     Take 325-650 mg by mouth every 6 hours as needed for mild pain       acetaZOLAMIDE 250 MG tablet    DIAMOX    90 tablet    Take 1 tablet (250 mg) by mouth daily       allopurinol 300 MG tablet    ZYLOPRIM    90 tablet    Take 1 tablet (300 mg) by mouth daily       amoxicillin 500 MG capsule    AMOXIL    42 capsule    TAKE ONE CAPSULE BY MOUTH THREE TIMES DAILY. TWO WEEKS ON THEN TWO WEEKS OFF       bumetanide 2 MG tablet    BUMEX    180 tablet    Take 1 tablet (2 mg) by mouth 2 times daily       ipratropium - albuterol 0.5 mg/2.5 mg/3 mL 0.5-2.5 (3) MG/3ML neb solution    DUONEB    360 mL    INHALE 1 VIAL BY NEBULIZATION EVERY 6 HOURS AS NEEDED. USE FOR INCREASED COUGH AND/OR CONGESTION       JOBST ANTI-EM KNEE LENGTH MED Misc     4 each    1 Device daily. Pressure of 15-20.       Multi-vitamin Tabs tablet      Take 2 tablets by mouth daily       nebulizer/adult mask Kit     1 kit    1 Units 4 times daily.       omeprazole 40 MG capsule    priLOSEC    90 capsule    TAKE ONE CAPSULE BY MOUTH DAILY. TAKE 30-60 MINUTES BEFORE A MEAL       * order for DME     1 Device    Dispense one handheld pulse oximeter for home use.  Check oxygen saturation daily.  Notify primary MD clinic if oxygen regularly <90% or <88% at any time, and increase supplemental oyxgen.       * order for DME     1 Units    Equipment being ordered: Tubing for nebulizer and accessories.       * order for DME      3 L continuous.       order for DME     2 Units    Equipment being ordered:1 pair (2 units) of knee high compression stockings, 20-30mm Hg, to be worn in the daytime only on both legs, with the toes open.       potassium chloride 20 MEQ Packet    KLOR-CON    180 tablet    Take 20 mEq by mouth 2 times daily dispense tablets not packets.        sildenafil 20 MG tablet    REVATIO/VIAGRA    45 tablet    Take 1 tablet (20 mg) by mouth 3 times daily Per pt sister, hospital instruction take 0.5 tab 3 times daily.       simvastatin 20 MG tablet    ZOCOR    36 tablet    Take 1 tablet (20 mg) by mouth three times a week       warfarin 3 MG tablet    COUMADIN    30 tablet    TAKE 1 TABLET BY MOUTH DAILY OR AS DIRECTED BY INR NURSE OR DOCTOR       * Notice:  This list has 3 medication(s) that are the same as other medications prescribed for you. Read the directions carefully, and ask your doctor or other care provider to review them with you.

## 2017-01-19 NOTE — PROGRESS NOTES
SUBJECTIVE:                                                    Sonny Vivar is a 51 year old male who presents to clinic today for the following health issues:        Heart Failure Follow-up    Symptoms:    Shortness of breath: happens with exertion only - stable    Lower extremity edema: none    Chest pain: No    Using more pillows than normal: No    Cough at night: No    Weight:    Checking weight daily: Yes    Weight change: none runs 170-171 pounds and is very stable.    Cardiology visits, ER/UC, or hospital admissions since last visit:    Cardiology Visit - 1/5/2017 notes are scanned in chart- sildenafil was increased to its full dose at that visit and the patient has been tolerating this increased dose well; He is planned for a follow-up Cardiology visit in 6 months.     He has not seen Benja Vines, but he has seen Dr Velazco for the repeat Sleep Study recently-the report is not in yet. It sounds like (per the patient's sister) he has no plans for routine follow-up with Benja Vines (his usual Pulm mid-level provider) but plans to follow-up with Dr Velazco for pulm issues in the meantime.      Medication side effects: none         Amount of exercise or physical activity: None    Problems taking medications regularly: No    Medication side effects: none    Diet: low salt         Problem list and histories reviewed & adjusted, as indicated.  Additional history: as documented    Patient Active Problem List   Diagnosis     Mental handicap     Pulmonary hypertension (H)     Acquired bronchiectasis (H)     Hypoventilation associated with obesity (H)     Post-phlebitic syndrome     Hyperlipidemia LDL goal <130     Dry eye syndrome     Punctate keratitis due to dry eyes and O2 use     Posterior subcapsular polar senile cataract     Macular scar     Obesity, morbid (more than 100 lbs over ideal weight or BMI > 40) (H)     Long-term (current) use of anticoagulants, INR goal 2.0-3.0     On supplemental oxygen therapy      Trisomy 21 syndrome     Abnormal CT scan of lung     Recurrent pulmonary embolism (H)     Anisometropia     Encounter for counseling     Primary hypercoagulable state (H)     Hypertension, benign     Pulmonary embolism and infarction (H)     Venous (peripheral) insufficiency     Obstructive sleep apnea     Encounter for counseling     Dependence on supplemental oxygen     Bronchiectasis (H)     Long-term (current) use of anticoagulants [Z79.01]     Idiopathic gout, unspecified chronicity, unspecified site     Gastroesophageal reflux disease, esophagitis presence not specified     Chronic atrial fibrillation (H)     Bilateral lower extremity edema     Deep vein thrombosis (DVT) of other vein of lower extremity (H)     Acute on chronic systolic heart failure (H)     Advance care planning     Health Care Home     BP check     Past Surgical History   Procedure Laterality Date     Surgical history of -        thoractomy for lung infection       Social History   Substance Use Topics     Smoking status: Never Smoker      Smokeless tobacco: Never Used      Comment: Lives in smoke free household     Alcohol Use: No     Family History   Problem Relation Age of Onset     HEART DISEASE Mother      Unknown/Adopted No family hx of      CANCER No family hx of      DIABETES No family hx of      Hypertension No family hx of      CEREBROVASCULAR DISEASE No family hx of      Thyroid Disease No family hx of      Glaucoma No family hx of      Macular Degeneration No family hx of          Current Outpatient Prescriptions   Medication Sig Dispense Refill     amoxicillin (AMOXIL) 500 MG capsule TAKE ONE CAPSULE BY MOUTH THREE TIMES DAILY. TWO WEEKS ON THEN TWO WEEKS OFF 42 capsule 3     acetaZOLAMIDE (DIAMOX) 250 MG tablet Take 1 tablet (250 mg) by mouth daily 90 tablet 3     potassium chloride (KLOR-CON) 20 MEQ Packet Take 20 mEq by mouth 2 times daily dispense tablets not packets. 180 tablet 1     bumetanide (BUMEX) 2 MG tablet Take  1 tablet (2 mg) by mouth 2 times daily 180 tablet 1     omeprazole (PRILOSEC) 40 MG capsule TAKE ONE CAPSULE BY MOUTH DAILY. TAKE 30-60 MINUTES BEFORE A MEAL 90 capsule 3     sildenafil (REVATIO/VIAGRA) 20 MG tablet Take 1 tablet (20 mg) by mouth 3 times daily Per pt sister, hospital instruction take 0.5 tab 3 times daily. (Patient taking differently: Take 20 mg by mouth 3 times daily ) 45 tablet 11     allopurinol (ZYLOPRIM) 300 MG tablet Take 1 tablet (300 mg) by mouth daily 90 tablet 3     warfarin (COUMADIN) 3 MG tablet TAKE 1 TABLET BY MOUTH DAILY OR AS DIRECTED BY INR NURSE OR DOCTOR 30 tablet 3     acetaminophen (TYLENOL) 325 MG tablet Take 325-650 mg by mouth every 6 hours as needed for mild pain       multivitamin, therapeutic with minerals (MULTI-VITAMIN) TABS Take 2 tablets by mouth daily        simvastatin (ZOCOR) 20 MG tablet Take 1 tablet (20 mg) by mouth three times a week 36 tablet 3     ipratropium - albuterol 0.5 mg/2.5 mg/3 mL (DUONEB) 0.5-2.5 (3) MG/3ML nebulization INHALE 1 VIAL BY NEBULIZATION EVERY 6 HOURS AS NEEDED. USE FOR INCREASED COUGH AND/OR CONGESTION 360 mL 1     ORDER FOR DME Equipment being ordered: Tubing for nebulizer and accessories. 1 Units 1     ORDER FOR DME Dispense one handheld pulse oximeter for home use.  Check oxygen saturation daily.  Notify primary MD clinic if oxygen regularly <90% or <88% at any time, and increase supplemental oyxgen. 1 Device 0     Respiratory Therapy Supplies (NEBULIZER/ADULT MASK) KIT 1 Units 4 times daily. 1 kit 0     ORDER FOR DME 3 L continuous.       order for DME Equipment being ordered:1 pair (2 units) of knee high compression stockings, 20-30mm Hg, to be worn in the daytime only on both legs, with the toes open. 2 Units 1     Elastic Bandages & Supports (JOBST ANTI-EM KNEE LENGTH MED) MISC 1 Device daily. Pressure of 15-20. 4 each 1         ==============================================================  ROS:  Constitutional, HEENT,  cardiovascular, pulmonary, GI, , musculoskeletal, neuro, skin, endocrine and psych systems are negative, except as otherwise noted.       OBJECTIVE:                                                    BP 96/60 mmHg  Pulse 79  Temp(Src) 96.9  F (36.1  C) (Tympanic)  Wt 174 lb 9.6 oz (79.198 kg)  SpO2 95%  Body mass index is 34.1 kg/(m^2).     GENERAL APPEARANCE: healthy, alert and in no distress; on O2 via NC.  EYES: Eyes grossly normal to inspection, and conjunctivae and sclerae normal  HENT: head normocephalic and atraumatic and mouth without ulcers or lesions, oropharynx clear and oral mucous membranes moist  NECK: no noticeable adenopathy, no asymmetry, masses, or scars    RESP: lungs clear to auscultation - no rales, rhonchi or wheezes  CV: regular rate and rhythm, normal S1 S2, no S3 or S4, no murmur, click or rub, +1-+2 pitting b/l pedal peripheral edema with chronic venous stasis changes.  ABDOMEN: soft, nontender, no hepatosplenomegaly, no masses and bowel sounds normal  MS: no musculoskeletal defects are noted and gait is age appropriate without ataxia  SKIN: no suspicious lesions or rashes  NEURO: mentation intact and speech normal  PSYCH: mentation appears normal and affect normal/bright.     Results for orders placed or performed in visit on 01/19/17   Basic metabolic panel   Result Value Ref Range    Sodium 139 133 - 144 mmol/L    Potassium 3.8 3.4 - 5.3 mmol/L    Chloride 99 94 - 109 mmol/L    Carbon Dioxide 33 (H) 20 - 32 mmol/L    Anion Gap 7 3 - 14 mmol/L    Glucose 62 (L) 70 - 99 mg/dL    Urea Nitrogen 30 7 - 30 mg/dL    Creatinine 1.32 (H) 0.66 - 1.25 mg/dL    GFR Estimate 57 (L) >60 mL/min/1.7m2    GFR Estimate If Black 69 >60 mL/min/1.7m2    Calcium 9.0 8.5 - 10.1 mg/dL        ASSESSMENT/PLAN:                                                        ICD-10-CM    1. Acute on chronic systolic heart failure (H) I50.23 Basic metabolic panel   2. Pulmonary hypertension (H) I27.2    3. Obstructive  sleep apnea G47.33      (I50.23) Acute on chronic systolic heart failure (H)  (primary encounter diagnosis)  Comment: continues to be euvolemic and largely asymptomatic,with his BMP at baseline (note: he does seem to have stage III CKD, from chart review of his Cr levels over the past 3 months)  Plan:   As per orders above and patient instructions below. -continue current management   check a BMP at next appointment and if wt decr and Cr still up, consider backing off of bumex.  Basic metabolic panel            (I27.2) Pulmonary hypertension (H)  Comment: stable  Plan: continue follow-up with PCP, Cardiology (next appointment in 6 months, ie 7/2017) and Dr Velazco (for the polysomnography and possible Pulm support)    (G47.33) Obstructive sleep apnea  Comment: recent repeat polysomnography, per the patient's sister  Plan: continue follow-up with Dr Velazco (for the polysomnography and possible Pulm support)       Patient Instructions        Please see the Cardiologist in 6 months.    You have indicated that you have just undergone a sleep study with (the Pulmonologist) Dr Velazco-we will wait for those results. You have indicated that you have an appointment with Dr Velazco on 2.13.16. Please ask him to forward his clinic notes us.    We will plan on ancillary nurse visits for blood pressure and weight checks every 2 weeks in our clinic    We will let you know your test results by Serena.    Please return to clinic in 1 month.                           Genna Balderas MD  [facility]

## 2017-01-19 NOTE — PROGRESS NOTES
ANTICOAGULATION FOLLOW-UP CLINIC VISIT    Patient Name:  Sonny Vivar  Date:  1/19/2017  Contact Type:  Face to Face    SUBJECTIVE:     Patient Findings     Comments Here with sister. INR 3.1. Down from 3.8. No changes or new concerns. Will adjust weekly dose and recheck in 2 wks.            OBJECTIVE    INR PROTIME   Date Value Ref Range Status   01/19/2017 3.1* 0.86 - 1.14 Final       ASSESSMENT / PLAN  INR assessment SUPRA    Recheck INR In: 2 WEEKS    INR Location Clinic      Anticoagulation Summary as of 1/19/2017     INR goal 2.0-3.0   Selected INR 3.1! (1/19/2017)   Maintenance plan 1.5 mg (3 mg x 0.5) on Thu; 3 mg (3 mg x 1) all other days   Full instructions 1.5 mg on Thu; 3 mg all other days   Weekly total 19.5 mg   Plan last modified Beverley Contreras RN (1/19/2017)   Next INR check 2/1/2017   Target end date     Indications   Pulmonary embolism and infarction (H) [I26.99]  Long-term (current) use of anticoagulants [Z79.01] [Z79.01]         Anticoagulation Episode Summary     INR check location     Preferred lab     Send INR reminders to [facility]AG CL[facility]    Comments       Anticoagulation Care Providers     Provider Role Specialty Phone number    Wes Camara MD Referring Internal Medicine [phone]    Genna Balderas MD Responsible Internal Medicine [phone]            See the Encounter Report to view Anticoagulation Flowsheet and Dosing Calendar (Go to Encounters tab in chart review, and find the Anticoagulation Therapy Visit)        Beverley Contreras RN

## 2017-01-19 NOTE — MR AVS SNAPSHOT
Sonny Vivar   1/19/2017 2:00 PM   Anticoagulation Therapy Visit    Description:  51 year old male   Provider:  AN ANTI COAG   Department:  An Nurse           INR as of 1/19/2017     Selected INR 3.1! (1/19/2017)      Anticoagulation Summary as of 1/19/2017     INR goal 2.0-3.0   Selected INR 3.1! (1/19/2017)   Full instructions 1.5 mg on Thu; 3 mg all other days   Next INR check 2/1/2017    Indications   Pulmonary embolism and infarction (H) [I26.99]  Long-term (current) use of anticoagulants [Z79.01] [Z79.01]         Your next Anticoagulation Clinic appointment(s)     Feb 01, 2017  2:30 PM   [facility]    [address] [facility] [id number] [phone]              Contact Numbers     [facility]  Please call  [phone] to cancel and/or reschedule your appointment, or with any problems or questions regarding your therapy.        January 2017 Details    Sun Mon Tue Wed Thu Fri Sat     1               2               3               4               5               6               7                 8               9               10               11               12               13               14                 15               16               17               18               19      1.5 mg   See details      20      3 mg         21      3 mg           22      3 mg         23      3 mg         24      3 mg         25      3 mg         26      1.5 mg         27      3 mg         28      3 mg           29      3 mg         30      3 mg         31      3 mg              Date Details   01/19 This INR check               How to take your warfarin dose     To take:  1.5 mg Take 0.5 of a 3 mg tablet.    To take:  3 mg Take 1 of the 3 mg tablets.           February 2017 Details    Sun Mon Tue Wed Thu Fri Sat        1            2               3               4                 5               6               7                8               9               10               11                 12               13               14               15               16               17               18                 19               20               21               22               23               24               25                 26               27               28                    Date Details   No additional details    Date of next INR:  2/1/2017         How to take your warfarin dose     To take:  3 mg Take 1 of the 3 mg tablets.

## 2017-01-20 NOTE — PROGRESS NOTES
Quick Note:    Dear Sonny,     Your test results are attached.   Your kidney function and blood electrolytes are within normal limits for you.    Please notify me via MyChart or contact the clinic at [phone] if you have any questions.    Genna Balderas MD  ______

## 2017-01-30 NOTE — PROGRESS NOTES
Clinic Care Coordination Contact  OUTREACH    Referral Information:  Referral Source: PCP  Reason for Contact: RN CC outreached to patient's sister Sarah. She states patient is doing ok physically. She states they just lost their 98 year old dad on Friday, which has been difficult for patient to process. She states she has had many conversations with him about the process. RN CC also suggested maybe having him talk with their local  at the [facility], she thought that would be a good idea. She states his weight is remaining stable at 167-168 lbs. He is continuing to work as he wants to at [employer] in [place]. She states he is using his O2 at 3 liters per minute per nasal canula but with activity increases it to 5-6 liters. She states he had his sleep study about 1 1/2 weeks ago, results showed he retains Co2 at night so they want to do an additional sleep study with a different machine which she is in the process of scheduling. She states patient is coming in for his INR, weight check, BP check on Wednesday 2/1/17 and OV with PCP 2/22/17. She denies having any questions or concerns for RN CC at this time. RN CC gave her her contact information should she need something between check ins which is in a month.  Care Conference: No     Universal Utilization:   ED Visits in last year: 1  Hospital visits in last year: 1  Last PCP appointment: 01/19/17             Clinical Concerns:  Current Medical Concerns:   Patient Active Problem List   Diagnosis     Mental handicap     Pulmonary hypertension (H)     Acquired bronchiectasis (H)     Hypoventilation associated with obesity (H)     Post-phlebitic syndrome     Hyperlipidemia LDL goal <130     Dry eye syndrome     Punctate keratitis due to dry eyes and O2 use     Posterior subcapsular polar senile cataract     Macular scar     Obesity, morbid (more than 100 lbs over ideal weight or BMI > 40) (H)     Long-term (current) use of anticoagulants, INR goal 2.0-3.0     On  supplemental oxygen therapy     Trisomy 21 syndrome     Abnormal CT scan of lung     Recurrent pulmonary embolism (H)     Anisometropia     Encounter for counseling     Primary hypercoagulable state (H)     Hypertension, benign     Pulmonary embolism and infarction (H)     Venous (peripheral) insufficiency     Obstructive sleep apnea     Encounter for counseling     Dependence on supplemental oxygen     Bronchiectasis (H)     Long-term (current) use of anticoagulants [Z79.01]     Idiopathic gout, unspecified chronicity, unspecified site     Gastroesophageal reflux disease, esophagitis presence not specified     Chronic atrial fibrillation (H)     Bilateral lower extremity edema     Deep vein thrombosis (DVT) of other vein of lower extremity (H)     Acute on chronic systolic heart failure (H)     Advance care planning     Health Care Home     BP check         Current Behavioral Concerns: none at this time    Education Provided to patient: n/a   Clinical Pathway Name: None  Clinical Pathway: None    Medication Management:  Not assessed.     Functional Status:  Mobility Status: Independent     Transportation: sister           Psychosocial:  Current living arrangement:: I live in a private home with family  Financial/Insurance: Medicare/[employer] [place], no other financial concerns at this time.       Resources and Interventions:  Current Resources: Talked about her having her  from her [employer] help discuss the recent death of their father to patient and help  if its needed.        Advanced Care Plans/Directives on file:: Yes        Goals:    n/a              Barriers: Recent death of father, not quite understanding the process  Strengths: Willingness to work with care coordination and [facility] supports.  Patient/Caregiver understanding: yes  Frequency of Care Coordination: 1 month  Upcoming appointment: 02/01/17 (INR,Ancillary nurse BP check and weight)     Plan:   Patient will continue to follow treatment plan  as directed and follow up with PCP with concerns ongoing.   RN CC to outreach in one month.      Briseida Carson RN, [facility]  RN Care Coordinator  [facility]  Phone # [phone]  1/30/2017 9:40 AM

## 2017-02-01 NOTE — MR AVS SNAPSHOT
Sonny Vivar   2/1/2017 2:30 PM   Anticoagulation Therapy Visit    Description:  51 year old male   Provider:  AN ANTI COAG   Department:  An Nurse           INR as of 2/1/2017     Selected INR 3.2! (2/1/2017)      Anticoagulation Summary as of 2/1/2017     INR goal 2.0-3.0   Selected INR 3.2! (2/1/2017)   Full instructions 1.5 mg on Mon, Thu; 3 mg all other days   Next INR check 2/15/2017    Indications   Pulmonary embolism and infarction (H) [I26.99]  Long-term (current) use of anticoagulants [Z79.01] [Z79.01]         Your next Anticoagulation Clinic appointment(s)     Feb 01, 2017  2:30 PM   An[facility]agulation [facility] with AN ANTI COAG   [facility]    [address] [facility] [address] [phone]            Feb 15, 2017  2:00 PM   Anticoagulation Visit with AN ANTI COAG   [facility]    [address] [facility] [address] [phone]              Contact Numbers     [facility]  Please call  [phone] to cancel and/or reschedule your appointment, or with any problems or questions regarding your therapy.        February 2017 Details    Sun Mon Tue Wed Thu Fri Sat        1      3 mg   See details      2      1.5 mg         3      3 mg         4      3 mg           5      3 mg         6      1.5 mg         7      3 mg         8      3 mg         9      1.5 mg         10      3 mg         11      3 mg           12      3 mg         13      1.5 mg         14      3 mg         15            16               17               18                 19               20               21               22               23               24               25                 26               27               28                    Date Details   02/01 This INR check       Date of next INR:  2/15/2017         How to take your warfarin dose     To take:  1.5 mg Take 0.5 of a 3 mg tablet.    To take:  3 mg Take 1 of the 3 mg  tablets.

## 2017-02-01 NOTE — NURSING NOTE
Chief Complaint   Patient presents with     Hypertension     Weight Check       Initial /74 mmHg  Pulse 82  Wt 174 lb (78.926 kg) Estimated body mass index is 33.98 kg/(m^2) as calculated from the following:    Height as of 1/4/17: 5' (1.524 m).    Weight as of this encounter: 174 lb (78.926 kg).  BP completed using cuff size: vida Chandra MA

## 2017-02-01 NOTE — PROGRESS NOTES
ANTICOAGULATION FOLLOW-UP CLINIC VISIT    Patient Name:  Sonny Vivar  Date:  2/1/2017  Contact Type:  Face to Face    SUBJECTIVE:     Patient Findings     Positives No Problem Findings    Comments INR 3.2. No changes. Last 3 results 3.0, 3.1,3.2. Will decrease weekly dose and recheck in 2 wks.            OBJECTIVE    INR PROTIME   Date Value Ref Range Status   02/01/2017 3.2* 0.86 - 1.14 Final       ASSESSMENT / PLAN  INR assessment SUPRA    Recheck INR In: 2 WEEKS    INR Location Clinic      Anticoagulation Summary as of 2/1/2017     INR goal 2.0-3.0   Selected INR 3.2! (2/1/2017)   Maintenance plan 1.5 mg (3 mg x 0.5) on Mon, Thu; 3 mg (3 mg x 1) all other days   Full instructions 1.5 mg on Mon, Thu; 3 mg all other days   Weekly total 18 mg   Plan last modified Beverley Contreras RN (2/1/2017)   Next INR check 2/15/2017   Target end date     Indications   Pulmonary embolism and infarction (H) [I26.99]  Long-term (current) use of anticoagulants [Z79.01] [Z79.01]         Anticoagulation Episode Summary     INR check location     Preferred lab     Send INR reminders to AN AN[facility]AG CLIN[facility]    Comments       Anticoagulation Care Providers     Provider Role Specialty Phone number    Wes Camara MD Referring Internal Medicine [phone]    Genna Balderas MD Responsible Internal Medicine [phone]            See the Encounter Report to view Anticoagulation Flowsheet and Dosing Calendar (Go to Encounters tab in chart review, and find the Anticoagulation Therapy Visit)        Beverley Contreras RN

## 2017-02-15 NOTE — PROGRESS NOTES
ANTICOAGULATION FOLLOW-UP CLINIC VISIT    Patient Name:  Sonny Vivar  Date:  2/15/2017  Contact Type:  Face to Face    SUBJECTIVE:     Patient Findings     Positives No Problem Findings    Comments Here with sister. Therapeutic. Dose was adjusted last check for supra . No changes or concerns. Continue this dose. Call if concerns or changes.            OBJECTIVE    INR Protime   Date Value Ref Range Status   02/15/2017 2.7 (A) 0.86 - 1.14 Final       ASSESSMENT / PLAN  INR assessment THER    Recheck INR In: 3 WEEKS    INR Location Clinic      Anticoagulation Summary as of 2/15/2017     INR goal 2.0-3.0   Today's INR 2.7   Maintenance plan 1.5 mg (3 mg x 0.5) on Mon, Thu; 3 mg (3 mg x 1) all other days   Full instructions 1.5 mg on Mon, Thu; 3 mg all other days   Weekly total 18 mg   No change documented Beverley Contreras RN   Plan last modified Beverley Contreras RN (2/1/2017)   Next INR check 3/8/2017   Target end date     Indications   Pulmonary embolism and infarction (H) [I26.99]  Long-term (current) use of anticoagulants [Z79.01] [Z79.01]         Anticoagulation Episode Summary     INR check location     Preferred lab     Send INR reminders to AN ANT[facility]AG CLINIC    Comments       Anticoagulation Care Providers     Provider Role Specialty Phone number    Wes Camara MD Referring Internal Medicine [phone]    Genna Balderas MD Responsible Internal Medicine [phone]            See the Encounter Report to view Anticoagulation Flowsheet and Dosing Calendar (Go to Encounters tab in chart review, and find the Anticoagulation Therapy Visit)        Beverley Contreras RN

## 2017-02-15 NOTE — MR AVS SNAPSHOT
Sonny Vivar   2/15/2017 2:15 PM   Anticoagulation Therapy Visit    Description:  51 year old male   Provider:  AN ANTI COAG   Department:  An Nurse           INR as of 2/15/2017     Today's INR 2.7      Anticoagulation Summary as of 2/15/2017     INR goal 2.0-3.0   Today's INR 2.7   Full instructions 1.5 mg on Mon, Thu; 3 mg all other days   Next INR check 3/8/2017    Indications   Pulmonary embolism and infarction (H) [I26.99]  Long-term (current) use of anticoagulants [Z79.01] [Z79.01]         Your next Anticoagulation Clinic appointment(s)     Mar 08, 2017  2:15 PM CST   Anticoagulation Visit with AN [facility]AG   [facility]    [address] [facility] [address] [phone]              Contact Numbers     [facility]  Please call  [phone] to cancel and/or reschedule your appointment, or with any problems or questions regarding your therapy.        February 2017 Details    Sun Mon Tue Wed Thu Fri Sat        1               2               3               4                 5               6               7               8               9               10               11                 12               13               14               15      3 mg   See details      16      1.5 mg         17      3 mg         18      3 mg           19      3 mg         20      1.5 mg         21      3 mg         22      3 mg         23      1.5 mg         24      3 mg         25      3 mg           26      3 mg         27      1.5 mg         28      3 mg              Date Details   02/15 This INR check               How to take your warfarin dose     To take:  1.5 mg Take 0.5 of a 3 mg tablet.    To take:  3 mg Take 1 of the 3 mg tablets.           March 2017 Details    Sun Mon Tue Wed Thu Fri Sat        1      3 mg         2      1.5 mg         3      3 mg         4      3 mg           5      3 mg         6      1.5 mg         7      3 mg         8            9                10               11                 12               13               14               15               16               17               18                 19               20               21               22               23               24               25                 26               27               28               29               30               31                 Date Details   No additional details    Date of next INR:  3/8/2017         How to take your warfarin dose     To take:  1.5 mg Take 0.5 of a 3 mg tablet.    To take:  3 mg Take 1 of the 3 mg tablets.

## 2017-02-21 NOTE — PROGRESS NOTES
SUBJECTIVE:                                                    Sonny Vivar is a 51 year old male who presents to clinic today for the following health issues:      PMH as noted, including recurrent DVT/PE with severe Pulm hypertension, and chronic A fib on warfarin, on Oxygen via a NC,  3L/min at rest and 5-6 L/min when ambulatory, BEAR, obesity-hypoventilation syndrome-followed by [facility], developmental delay,     Accompanied by his sister.    Heart Failure Follow-up    Symptoms:    Shortness of breath: happens with exertion only - stable    Lower extremity edema: worsened from baseline    Chest pain: No    Using more pillows than normal: No    Cough at night: Yes-  sometimes    Weight:    Checking weight daily: Yes    Weight change: none    Cardiology visits, ER/UC, or hospital admissions since last visit: None    Medication side effects: slight tremor           Amount of exercise or physical activity: None    Problems taking medications regularly: No    Medication side effects: none  Diet: low salt     Same O2 requirements as previously.   No shortness of breath when he sits; gets transiently short of breath when moving, not worse than before.   Appetite a bit down, earlier satiety (weight is down 2 lbs) but no nausea.  His abdomen is less distended than previously, per the sister.   No chest pain but does point to upper chest-it is indigestion pain.    No cough, no f/c.      Left foot superficial ulcer:   Sister first noted it a few days ago.    There is a 0.5cm deep and 1cm side venous stasis ulcer and surrounding dry, scaly skin.  W/o e/o cellulitis.  1cm blood blister on the dorsal aspect of the right foot.  we will refer to [facility].     Also reports b/ leg tingling apparently.      ?BEAR:  Patient completed the sleep study that showed that  he has CO2 retention and that they want to try a different machine on the patient.  So they want to repeat the sleep study.   They advised the  sister to call medicare to schedule this. Medicare told the patient that they do not preauthorize this, so there is no assurance from them that this sleep study will be paid for.  So, the sister was told by the Pulmonology office to wait for Dr Velazco to return from vacation, to try to straighten this matter our. I advised the sister that if Dr Velazco will be unable to help or there will be a delay in getting in touch with him, then she contact our Care Coordinator to help with this.          Problem list and histories reviewed & adjusted, as indicated.  Additional history: as documented    Patient Active Problem List   Diagnosis     Mental handicap     Pulmonary hypertension (H)     Acquired bronchiectasis (H)     Hypoventilation associated with obesity (H)     Post-phlebitic syndrome     Hyperlipidemia LDL goal <130     Dry eye syndrome     Punctate keratitis due to dry eyes and O2 use     Posterior subcapsular polar senile cataract     Macular scar     Obesity, morbid (more than 100 lbs over ideal weight or BMI > 40) (H)     Long-term (current) use of anticoagulants, INR goal 2.0-3.0     On supplemental oxygen therapy     Trisomy 21 syndrome     Abnormal CT scan of lung     Recurrent pulmonary embolism (H)     Anisometropia     Encounter for counseling     Primary hypercoagulable state (H)     Hypertension, benign     Pulmonary embolism and infarction (H)     Venous (peripheral) insufficiency     Obstructive sleep apnea     Encounter for counseling     Dependence on supplemental oxygen     Bronchiectasis (H)     Long-term (current) use of anticoagulants [Z79.01]     Idiopathic gout, unspecified chronicity, unspecified site     Gastroesophageal reflux disease, esophagitis presence not specified     Chronic atrial fibrillation (H)     Bilateral lower extremity edema     Deep vein thrombosis (DVT) of other vein of lower extremity (H)     Acute on chronic systolic heart failure (H)     Advance care planning      Health Care Home     BP check     Acute on chronic respiratory failure with hypoxia and hypercapnia (H)     Past Surgical History   Procedure Laterality Date     Surgical history of -        thoractomy for lung infection       Social History   Substance Use Topics     Smoking status: Never Smoker     Smokeless tobacco: Never Used      Comment: Lives in smoke free household     Alcohol use No     Family History   Problem Relation Age of Onset     HEART DISEASE Mother      Unknown/Adopted No family hx of      CANCER No family hx of      DIABETES No family hx of      Hypertension No family hx of      CEREBROVASCULAR DISEASE No family hx of      Thyroid Disease No family hx of      Glaucoma No family hx of      Macular Degeneration No family hx of          Current Outpatient Prescriptions   Medication Sig Dispense Refill     BETA BLOCKER NOT PRESCRIBED, INTENTIONAL, Beta Blocker not prescribed intentionally due to Evidence of fluid overload or volume depletion  0     ACE/ARB NOT PRESCRIBED, INTENTIONAL, Please choose reason not prescribed, below       warfarin (COUMADIN) 3 MG tablet TAKE 1 TABLET BY MOUTH DAILY OR AS DIRECTED BY INR NURSE OR DOCTOR 90 tablet 0     amoxicillin (AMOXIL) 500 MG capsule TAKE ONE CAPSULE BY MOUTH THREE TIMES DAILY. TWO WEEKS ON THEN TWO WEEKS OFF 42 capsule 3     acetaZOLAMIDE (DIAMOX) 250 MG tablet Take 1 tablet (250 mg) by mouth daily 90 tablet 3     potassium chloride (KLOR-CON) 20 MEQ Packet Take 20 mEq by mouth 2 times daily dispense tablets not packets. 180 tablet 1     bumetanide (BUMEX) 2 MG tablet Take 1 tablet (2 mg) by mouth 2 times daily 180 tablet 1     omeprazole (PRILOSEC) 40 MG capsule TAKE ONE CAPSULE BY MOUTH DAILY. TAKE 30-60 MINUTES BEFORE A MEAL 90 capsule 3     sildenafil (REVATIO/VIAGRA) 20 MG tablet Take 1 tablet (20 mg) by mouth 3 times daily Per pt sister, hospital instruction take 0.5 tab 3 times daily. (Patient taking differently: Take 20 mg by mouth 3 times daily  2/22/2017 patient takes one tablet 3 times daily) 45 tablet 11     allopurinol (ZYLOPRIM) 300 MG tablet Take 1 tablet (300 mg) by mouth daily 90 tablet 3     acetaminophen (TYLENOL) 325 MG tablet Take 325-650 mg by mouth every 6 hours as needed for mild pain       multivitamin, therapeutic with minerals (MULTI-VITAMIN) TABS Take 2 tablets by mouth daily        order for DME Equipment being ordered:1 pair (2 units) of knee high compression stockings, 20-30mm Hg, to be worn in the daytime only on both legs, with the toes open. 2 Units 1     simvastatin (ZOCOR) 20 MG tablet Take 1 tablet (20 mg) by mouth three times a week 36 tablet 3     ipratropium - albuterol 0.5 mg/2.5 mg/3 mL (DUONEB) 0.5-2.5 (3) MG/3ML nebulization INHALE 1 VIAL BY NEBULIZATION EVERY 6 HOURS AS NEEDED. USE FOR INCREASED COUGH AND/OR CONGESTION 360 mL 1     ORDER FOR DME Equipment being ordered: Tubing for nebulizer and accessories. 1 Units 1     ORDER FOR DME Dispense one handheld pulse oximeter for home use.  Check oxygen saturation daily.  Notify primary MD clinic if oxygen regularly <90% or <88% at any time, and increase supplemental oyxgen. 1 Device 0     Respiratory Therapy Supplies (NEBULIZER/ADULT MASK) KIT 1 Units 4 times daily. 1 kit 0     ORDER FOR DME 3 L continuous.       Elastic Bandages & Supports (JOBST ANTI-EM KNEE LENGTH MED) MISC 1 Device daily. Pressure of 15-20. (Patient not taking: Reported on 2/22/2017) 4 each 1       ==============================================================  ROS:  Constitutional, HEENT, cardiovascular, pulmonary, GI, , musculoskeletal, neuro, skin, endocrine and psych systems are negative, except as otherwise noted.       OBJECTIVE:                                                    /74 (BP Location: Right arm, Patient Position: Chair, Cuff Size: Adult Regular)  Pulse 70  Temp 96.6  F (35.9  C) (Oral)  Wt 172 lb 3.2 oz (78.1 kg)  SpO2 90%  BMI 33.63 kg/m2  Body mass index is 33.63 kg/(m^2).      Vitals:    02/22/17 1444   BP: 112/74   BP Location: Right arm   Patient Position: Chair   Cuff Size: Adult Regular   Pulse: 70   Temp: 96.6  F (35.9  C)   TempSrc: Oral   SpO2: 90%   Weight: 172 lb 3.2 oz (78.1 kg)     The pulse oxymetry results from today were as follows:  Resting O2 on room air: 83%.   Ambulatory O2 on room air: 82%-note, the patient could tolerate up to 5 minutes of walking.   Ambulatory O2 sat on 3L/min O2 via nasal canula: 88-89%-note, the patient could tolerate up to 5 minutes of walking.   Also note: his O2 was noted to drop to the 70s when he would breathe through his mouth and not his nose, but promptly improved when he was reminded to breathe through his nose.          GENERAL APPEARANCE: healthy, alert and in no distress; on O2 via NC.  EYES: Eyes grossly normal to inspection, and conjunctivae and sclerae normal  HENT: head normocephalic and atraumatic and mouth without ulcers or lesions, oropharynx clear and oral mucous membranes moist  NECK: no noticeable adenopathy, no asymmetry, masses, or scars    RESP: lungs clear to auscultation - no rales, rhonchi or wheezes  CV: regular rate and rhythm, normal S1 S2, no S3 or S4, no murmur, click or rub, +1-+2 pitting b/l pedal peripheral edema with chronic venous stasis changes.  ABDOMEN: soft, nontender, no hepatosplenomegaly, no masses and bowel sounds normal  MS: no musculoskeletal defects are noted and gait is age appropriate without ataxia  SKIN:   There is a 0.5cm deep and 1cm side venous stasis ulcer and surrounding dry, scaly skin.  W/o e/o cellulitis.  1cm blood blister on the dorsal aspect of the right foot.  o/w, no suspicious lesions or rashes  NEURO: mentation intact and speech normal  PSYCH: mentation appears normal and affect normal/bright.     Results for orders placed or performed in visit on 02/22/17   Basic metabolic panel   Result Value Ref Range    Sodium 137 133 - 144 mmol/L    Potassium 3.8 3.4 - 5.3 mmol/L     Chloride 99 94 - 109 mmol/L    Carbon Dioxide 30 20 - 32 mmol/L    Anion Gap 8 3 - 14 mmol/L    Glucose 82 70 - 99 mg/dL    Urea Nitrogen 29 7 - 30 mg/dL    Creatinine 1.36 (H) 0.66 - 1.25 mg/dL    GFR Estimate 55 (L) >60 mL/min/1.7m2    GFR Estimate If Black 67 >60 mL/min/1.7m2    Calcium 8.5 8.5 - 10.1 mg/dL   BNP-N terminal pro   Result Value Ref Range    N-Terminal Pro Bnp 2353 (H) 0 - 125 pg/mL        ASSESSMENT/PLAN:                                                        ICD-10-CM    1. Acute on chronic respiratory failure with hypoxia and hypercapnia (H) J96.21     J96.22    2. Venous ulcer of left leg (H) I83.029 WOUND CARE REFERRAL    and blood blister of RLE   3. Acquired bronchiectasis (H) J47.9    4. Hypoventilation associated with obesity (H) E66.2    5. Primary hypercoagulable state (H) D68.59    6. Chronic atrial fibrillation (H) I48.2    7. Acute on chronic systolic heart failure (H) I50.23 BNP-N terminal pro     ACE/ARB NOT PRESCRIBED, INTENTIONAL,   8. Hypertension, benign I10 Basic metabolic panel     BETA BLOCKER NOT PRESCRIBED, INTENTIONAL,     (J96.21,  J96.22) Acute on chronic respiratory failure with hypoxia and hypercapnia (H)  (primary encounter diagnosis)  Comment: as per HPI-currently in the process of organizing the repeat sleep study, apparently for likely BIpap titration  Plan: As per orders above and patient instructions below.     (I83.029) Venous ulcer of left leg (H)  Comment: and blood blister of RLE  Plan: WOUND CARE REFERRAL            (J47.9) Acquired bronchiectasis (H)  Comment: as per HPI-stable  Plan: continue current regimen    (E66.2) Hypoventilation associated with obesity (H)  Comment: as above;   Plan: continue current regimen    (D68.59) Primary hypercoagulable state (H)  Comment: history of recurrent DVT/PE,  On warfarin  Plan: continue current regimen    (I48.2) Chronic atrial fibrillation (H)  Comment: rate controlled asympt  Plan: continue current regimen      (I50.23) Acute on chronic systolic heart failure (H)  Comment: euvolemic-pro BNP improved from previous  Plan: BNP-N terminal pro, ACE/ARB NOT PRESCRIBED,         INTENTIONAL,        Continue current regimen    (I10) Hypertension, benign  Comment: controlled asympt  Plan: Basic metabolic panel, BETA BLOCKER NOT         PRESCRIBED, INTENTIONAL,        Continue current regimen.      Patient Instructions   We will fax the prescription for oxygen, based on today's oxygen test results.  Please call to schedule an appointment with the wound care nurse for your foot wounds:  [facility], at [place], [place]. Ph: [phone]. They should assess the wound within the next 2 weeks.   Until you see them you can use a nonstick pad with ACEWRAP on the left foot. You may put bacitracin on the pad and change the pad once a day and as needed whenever it gets soiled.  Do not make the ACERWRAP too tight.   If you need our help with ordering the second sleep study by Dr Velazco, you can contact our clinic and ask to speak with our Care Coordinator.   We will plan on seeing you back in 6 weeks.   We will let you know your test results by MyChart.                  Genna Balderas MD  [facility]

## 2017-02-21 NOTE — TELEPHONE ENCOUNTER
warfarin (COUMADIN) 3 MG tablet   Last Written Prescription Date: 11/1/16  Last Fill Qty: 30, # refills: 3  Last Office Visit with [facility] or [facility] prescribing provider: 1/19/17  Next 5 appointments (look out 90 days)     Feb 22, 2017  2:20 PM CST   Office Visit with Genna Balderas MD   [facility] ([facility])    [address] [address] [phone]                   Date and Result of Last PT/INR:   Lab Results   Component Value Date    INR 2.7 02/15/2017    INR 3.2 02/01/2017    INR 6.74 08/16/2016    INR 3.10 04/12/2016    INR 1.2 05/05/2009

## 2017-02-22 NOTE — PATIENT INSTRUCTIONS
We will fax the prescription for oxygen, based on today's oxygen test results.  Please call to schedule an appointment with the wound care nurse for your foot wounds:  [facility], at [facility], [place], [place]. Ph: [phone]. They should assess the wound within the next 2 weeks.   Until you see them you can use a nonstick pad with ACEWRAP on the left foot. You may put bacitracin on the pad and change the pad once a day and as needed whenever it gets soiled.  Do not make the ACERWRAP too tight.   If you need our help with ordering the second sleep study by Dr Velazco, you can contact our clinic and ask to speak with our Care Coordinator.   We will plan on seeing you back in 6 weeks.   We will let you know your test results by Serena.

## 2017-02-22 NOTE — NURSING NOTE
Chief Complaint   Patient presents with     Heart Failure     CHF recheck       Initial /74 (BP Location: Right arm, Patient Position: Chair, Cuff Size: Adult Regular)  Pulse 70  Temp 96.6  F (35.9  C) (Oral)  Wt 172 lb 3.2 oz (78.1 kg)  SpO2 90%  BMI 33.63 kg/m2 Estimated body mass index is 33.63 kg/(m^2) as calculated from the following:    Height as of 1/4/17: 5' (1.524 m).    Weight as of this encounter: 172 lb 3.2 oz (78.1 kg).  Medication Reconciliation: complete    Coreen Vergara CMA

## 2017-02-22 NOTE — MR AVS SNAPSHOT
After Visit Summary   2/22/2017    Sonny Vivar    MRN: [id number]           Patient Information     Date Of Birth          1965        Visit Information        Provider Department      2/22/2017 2:20 PM Genna Balderas MD [facility]        Today's Diagnoses     Acute on chronic systolic heart failure (H)    -  1    Hypertension, benign        Venous ulcer of left leg (H)          Care Instructions    We will fax the prescription for oxygen, based on today's oxygen test results.  Please call to schedule an appointment with the wound care nurse for your foot wounds:  [facility], at [place], [place]. Ph: [phone]. They should assess the wound within the next 2 weeks.   Until you see them you can use a nonstick pad with ACEWRAP on the left foot. You may put bacitracin on the pad and change the pad once a day and as needed whenever it gets soiled.  Do not make the ACERWRAP too tight.   If you need our help with ordering the second sleep study by Dr Velazco, you can contact our clinic and ask to speak with our Care Coordinator.   We will plan on seeing you back in 6 weeks.   We will let you know your test results by [facility].        Follow-ups after your visit        Additional Services     WOUND CARE REFERRAL       Your provider has referred you to: OTHER PROVIDERS: [facility], at [place], [place]. Ph: [phone]    Reason for referral: Wound care      1. [facility] Wound Consult appointment is related to what kind of wound: Venous leg/foot ulcer    2. Location of wound: Lower extremity: venous stasis ulcer on the left foot and a blood blister on the right foot.     3. Reason for referral: Assess and treat as indicated    4. Desired treatment if any: Per WO nurse     Please be aware that coverage of these services is subject to the terms and limitations of your health insurance plan.  Call member services at your health plan  with any benefit or coverage questions.      Please bring the following with you to your appointment:    (1) Any X-Rays, CTs or MRIs which have been performed.  Contact the facility where they were done to arrange for  prior to your scheduled appointment.    (2) List of current medications   (3) This referral request   (4) Any documents/labs given to you for this referral                  Your next 10 appointments already scheduled     Mar 08, 2017  2:15 PM CST   [facility] with AN ANTI [facility]AG   [facility]    [address] [facility] [address] [phone]              Who to contact     If you have questions or need follow up information about today's clinic visit or your schedule please contact [facility] directly at [phone].  Normal or non-critical lab and imaging results will be communicated to you by MyChart, letter or phone within 4 business days after the clinic has received the results. If you do not hear from us within 7 days, please contact the clinic through MyChart or phone. If you have a critical or abnormal lab result, we will notify you by phone as soon as possible.  Submit refill requests through [employer] or call your pharmacy and they will forward the refill request to us. Please allow 3 business days for your refill to be completed.          Additional Information About Your Visit        Home[employer] Information     [employer] gives you secure access to your electronic health record. If you see a primary care provider, you can also send messages to your care team and make appointments. If you have questions, please call your primary care clinic.  If you do not have a primary care provider, please call [phone] and they will assist you.        Care EveryWhere ID     This is your Care EveryWhere ID. This could be used by other organizations to access your [place] medical records  [id number]        Your Vitals Were      Pulse Temperature Pulse Oximetry BMI (Body Mass Index)          70 96.6  F (35.9  C) (Oral) 90% 33.63 kg/m2         Blood Pressure from Last 3 Encounters:   02/22/17 112/74   02/15/17 116/70   02/01/17 114/74    Weight from Last 3 Encounters:   02/22/17 172 lb 3.2 oz (78.1 kg)   02/01/17 174 lb (78.9 kg)   01/19/17 174 lb 9.6 oz (79.2 kg)              We Performed the Following     Basic metabolic panel     BNP-N terminal pro     WOUND CARE REFERRAL          Today's Medication Changes          These changes are accurate as of: 2/22/17  4:44 PM.  If you have any questions, ask your nurse or doctor.               These medicines have changed or have updated prescriptions.        Dose/Directions    sildenafil 20 MG tablet   Commonly known as:  REVATIO/VIAGRA   This may have changed:  additional instructions   Used for:  Pulmonary hypertension (H)        Dose:  20 mg   Take 1 tablet (20 mg) by mouth 3 times daily Per pt sister, hospital instruction take 0.5 tab 3 times daily.   Quantity:  45 tablet   Refills:  11                Primary Care Provider Office Phone # Fax #    Genna Balderas [phone]       [facility] [id number] [place] [address]        Thank you!     Thank you for choosing [facility]  for your care. Our goal is always to provide you with excellent care. Hearing back from our patients is one way we can continue to improve our services. Please take a few minutes to complete the written survey that you may receive in the mail after your visit with us. Thank you!             Your Updated Medication List - Protect others around you: Learn how to safely use, store and throw away your medicines at www.disposemymeds.org.          This list is accurate as of: 2/22/17  4:44 PM.  Always use your most recent med list.                   Brand Name Dispense Instructions for use    acetaminophen 325 MG tablet    TYLENOL     Take 325-650 mg by mouth  every 6 hours as needed for mild pain       acetaZOLAMIDE 250 MG tablet    DIAMOX    90 tablet    Take 1 tablet (250 mg) by mouth daily       allopurinol 300 MG tablet    ZYLOPRIM    90 tablet    Take 1 tablet (300 mg) by mouth daily       amoxicillin 500 MG capsule    AMOXIL    42 capsule    TAKE ONE CAPSULE BY MOUTH THREE TIMES DAILY. TWO WEEKS ON THEN TWO WEEKS OFF       bumetanide 2 MG tablet    BUMEX    180 tablet    Take 1 tablet (2 mg) by mouth 2 times daily       ipratropium - albuterol 0.5 mg/2.5 mg/3 mL 0.5-2.5 (3) MG/3ML neb solution    DUONEB    360 mL    INHALE 1 VIAL BY NEBULIZATION EVERY 6 HOURS AS NEEDED. USE FOR INCREASED COUGH AND/OR CONGESTION       JOBST ANTI-EM KNEE LENGTH MED Misc     4 each    1 Device daily. Pressure of 15-20.       Multi-vitamin Tabs tablet      Take 2 tablets by mouth daily       nebulizer/adult mask Kit     1 kit    1 Units 4 times daily.       omeprazole 40 MG capsule    priLOSEC    90 capsule    TAKE ONE CAPSULE BY MOUTH DAILY. TAKE 30-60 MINUTES BEFORE A MEAL       * order for DME     1 Device    Dispense one handheld pulse oximeter for home use.  Check oxygen saturation daily.  Notify primary MD clinic if oxygen regularly <90% or <88% at any time, and increase supplemental oyxgen.       * order for DME     1 Units    Equipment being ordered: Tubing for nebulizer and accessories.       * order for DME      3 L continuous.       order for DME     2 Units    Equipment being ordered:1 pair (2 units) of knee high compression stockings, 20-30mm Hg, to be worn in the daytime only on both legs, with the toes open.       potassium chloride 20 MEQ Packet    KLOR-CON    180 tablet    Take 20 mEq by mouth 2 times daily dispense tablets not packets.       sildenafil 20 MG tablet    REVATIO/VIAGRA    45 tablet    Take 1 tablet (20 mg) by mouth 3 times daily Per pt sister, hospital instruction take 0.5 tab 3 times daily.       simvastatin 20 MG tablet    ZOCOR    36 tablet    Take 1  tablet (20 mg) by mouth three times a week       warfarin 3 MG tablet    COUMADIN    90 tablet    TAKE 1 TABLET BY MOUTH DAILY OR AS DIRECTED BY INR NURSE OR DOCTOR       * Notice:  This list has 3 medication(s) that are the same as other medications prescribed for you. Read the directions carefully, and ask your doctor or other care provider to review them with you.

## 2017-02-24 NOTE — PROGRESS NOTES
Dear Sonny,     Your test results are attached.      Your kidney function and blood electrolytes are within normal limits for you.    Your pro-BNP (the test for heart failure) is lower than what it was before, which is good.     Please notify me via [employer] or contact the clinic at [phone] if you have any questions.    Genna Balderas MD

## 2017-02-26 PROBLEM — J96.22 ACUTE ON CHRONIC RESPIRATORY FAILURE WITH HYPOXIA AND HYPERCAPNIA (H): Status: ACTIVE | Noted: 2017-01-01

## 2017-02-26 PROBLEM — J96.21 ACUTE ON CHRONIC RESPIRATORY FAILURE WITH HYPOXIA AND HYPERCAPNIA (H): Status: ACTIVE | Noted: 2017-01-01

## 2017-02-26 NOTE — TELEPHONE ENCOUNTER
I placed an order for this patient's home oxygen.    Please fax this order, along with my (Dr Balderas') last OV note from 2.22.17, to: [facility]: fax: [phone].    Thank you,  Genna Balderas MD

## 2017-03-08 NOTE — MR AVS SNAPSHOT
Sonny Vivar   3/8/2017 2:15 PM   Anticoagulation Therapy Visit    Description:  51 year old male   Provider:  AN ANTI COAG   Department:  An Nurse           INR as of 3/8/2017     Today's INR 2.7      Anticoagulation Summary as of 3/8/2017     INR goal 2.0-3.0   Today's INR 2.7   Full instructions 1.5 mg on Mon, Thu; 3 mg all other days   Next INR check 3/27/2017    Indications   Pulmonary embolism and infarction (H) [I26.99]  Long-term (current) use of anticoagulants [Z79.01] [Z79.01]         Your next Anticoagulation Clinic appointment(s)     Mar 27, 2017  2:15 PM CDT   [facility]    [address] [facility] [address] [phone]            Apr 19, 2017  2:45 PM CDT   [facility]ticoagulation Visit with [facility]AG   [facility]    [address] [facility] [address] [phone]              Contact Numbers     [facility]  Please call  [phone] to cancel and/or reschedule your appointment, or with any problems or questions regarding your therapy.        March 2017 Details    Sun Mon Tue Wed Thu Fri Sat        1               2               3               4                 5               6               7               8      3 mg   See details      9      1.5 mg         10      3 mg         11      3 mg           12      3 mg         13      1.5 mg         14      3 mg         15      3 mg         16      1.5 mg         17      3 mg         18      3 mg           19      3 mg         20      1.5 mg         21      3 mg         22      3 mg         23      1.5 mg         24      3 mg         25      3 mg           26      3 mg         27            28               29               30               31                 Date Details   03/08 This INR check       Date of next INR:  3/27/2017         How to take your warfarin dose     To take:  1.5 mg Take 0.5 of a  3 mg tablet.    To take:  3 mg Take 1 of the 3 mg tablets.

## 2017-03-08 NOTE — PROGRESS NOTES
ANTICOAGULATION FOLLOW-UP CLINIC VISIT    Patient Name:  Sonny Vivar  Date:  3/8/2017  Contact Type:  Face to Face    SUBJECTIVE:        OBJECTIVE    INR Protime   Date Value Ref Range Status   03/08/2017 2.7 (A) 0.86 - 1.14 Final       ASSESSMENT / PLAN  INR assessment THER    Recheck INR In: 2 WEEKS    INR Location Clinic      Anticoagulation Summary as of 3/8/2017     INR goal 2.0-3.0   Today's INR 2.7   Maintenance plan 1.5 mg (3 mg x 0.5) on Mon, Thu; 3 mg (3 mg x 1) all other days   Full instructions 1.5 mg on Mon, Thu; 3 mg all other days   Weekly total 18 mg   No change documented Beverley Contreras RN   Plan last modified Beverley Contreras RN (2/1/2017)   Next INR check    Target end date     Indications   Pulmonary embolism and infarction (H) [I26.99]  Long-term (current) use of anticoagulants [Z79.01] [Z79.01]         Anticoagulation Episode Summary     INR check location     Preferred lab     Send INR reminders to AN ANTICOAG CLINIC    Comments       Anticoagulation Care Providers     Provider Role Specialty Phone number    Wes Camara MD Referring Internal Medicine [phone]    Genna Balderas MD Responsible Internal Medicine [phone]            See the Encounter Report to view Anticoagulation Flowsheet and Dosing Calendar (Go to Encounters tab in chart review, and find the Anticoagulation Therapy Visit)        Beverley Contreras RN

## 2017-03-08 NOTE — TELEPHONE ENCOUNTER
Pt saw Dr Balderas on 2/22/17, not sure when he is supposed to come back 6 or 8 weeks. Please call his sister Sarah and let her know.    Thank You

## 2017-03-08 NOTE — TELEPHONE ENCOUNTER
Per 2/22/17 ov  Patient Instructions   We will plan on seeing you back in 6 weeks.     Called [place] patients legal guardian. 6 weeks does not work out between [place] and providers schedule. Soonest appointment that works with both schedules is 4/19/17.  FYI only.  Ally García, BIENVENIDON RN

## 2017-03-24 NOTE — MR AVS SNAPSHOT
After Visit Summary   3/24/2017    Sonny Vivar    MRN: [id number]           Patient Information     Date Of Birth          1965        Visit Information        Provider Department      3/24/2017 1:15 PM Robert Webster DPM [facility]        Today's Diagnoses     Corns and callosities    -  1    Dermatophytosis of nail          Care Instructions    Weight management plan: Patient was referred to their PCP to discuss a diet and exercise plan.          Follow-ups after your visit        Your next 10 appointments already scheduled     Mar 27, 2017  2:15 PM CDT   Anticoagulation Visit with AN ANTI COAG   [facility]    [address] [facility] [address]   [phone]            Apr 19, 2017  2:45 PM CDT   Anticoagulation Visit with AN ANTI COAG   [facility]    [address] [facility] [address]   [phone]            Apr 19, 2017  3:00 PM CDT   SHORT with Genna Balderas MD   [facility]    [address] [facility] [address] [phone]              Who to contact     If you have questions or need follow up information about today's clinic visit or your schedule please contact [facility] directly at [phone].  Normal or non-critical lab and imaging results will be communicated to you by MyChart, letter or phone within 4 business days after the clinic has received the results. If you do not hear from us within 7 days, please contact the clinic through MyChart or phone. If you have a critical or abnormal lab result, we will notify you by phone as soon as possible.  Submit refill requests through [employer] or call your pharmacy and they will forward the refill request to us. Please allow 3 business days for your refill to be completed.          Additional Information About Your Visit         [employer] Information     [employer] gives you secure access to your electronic health record. If you see a primary care provider, you can also send messages to your care team and make appointments. If you have questions, please call your primary care clinic.  If you do not have a primary care provider, please call [phone] and they will assist you.        Care EveryWhere ID     This is your Care EveryWhere ID. This could be used by other organizations to access your [place] medical records  [id number]        Your Vitals Were     Pulse Pulse Oximetry BMI (Body Mass Index)             80 90% 33.59 kg/m2          Blood Pressure from Last 3 Encounters:   02/22/17 112/74   02/15/17 116/70   02/01/17 114/74    Weight from Last 3 Encounters:   03/24/17 78 kg (172 lb)   02/22/17 78.1 kg (172 lb 3.2 oz)   02/01/17 78.9 kg (174 lb)              We Performed the Following     DEBRIDEMENT OF NAILS, 6 OR MORE     TRIM HYPERKERATOTIC SKIN LESION,2-4          Today's Medication Changes          These changes are accurate as of: 3/24/17  2:05 PM.  If you have any questions, ask your nurse or doctor.               These medicines have changed or have updated prescriptions.        Dose/Directions    sildenafil 20 MG tablet   Commonly known as:  REVATIO/VIAGRA   This may have changed:  additional instructions   Used for:  Pulmonary hypertension (H)        Dose:  20 mg   Take 1 tablet (20 mg) by mouth 3 times daily Per pt sister, hospital instruction take 0.5 tab 3 times daily.   Quantity:  45 tablet   Refills:  11                Primary Care Provider Office Phone # Fax #    Genna Balderas [phone]       [facility] [address] [address] [facility] [address] [address]        Thank you!     Thank you for choosing [facility]  for your care. Our goal is always to provide you with excellent care. Hearing back from our patients is one way we can continue to improve our services.  Please take a few minutes to complete the written survey that you may receive in the mail after your visit with us. Thank you!             Your Updated Medication List - Protect others around you: Learn how to safely use, store and throw away your medicines at www.disposemymeds.org.          This list is accurate as of: 3/24/17  2:05 PM.  Always use your most recent med list.                   Brand Name Dispense Instructions for use    ACE/ARB NOT PRESCRIBED (INTENTIONAL)      Please choose reason not prescribed, below       acetaminophen 325 MG tablet    TYLENOL     Take 325-650 mg by mouth every 6 hours as needed for mild pain       acetaZOLAMIDE 250 MG tablet    DIAMOX    90 tablet    Take 1 tablet (250 mg) by mouth daily       allopurinol 300 MG tablet    ZYLOPRIM    90 tablet    Take 1 tablet (300 mg) by mouth daily       amoxicillin 500 MG capsule    AMOXIL    42 capsule    TAKE ONE CAPSULE BY MOUTH THREE TIMES DAILY. TWO WEEKS ON THEN TWO WEEKS OFF       BETA BLOCKER NOT PRESCRIBED (INTENTIONAL)      Beta Blocker not prescribed intentionally due to Evidence of fluid overload or volume depletion       bumetanide 2 MG tablet    BUMEX    180 tablet    Take 1 tablet (2 mg) by mouth 2 times daily       ipratropium - albuterol 0.5 mg/2.5 mg/3 mL 0.5-2.5 (3) MG/3ML neb solution    DUONEB    360 mL    INHALE 1 VIAL BY NEBULIZATION EVERY 6 HOURS AS NEEDED. USE FOR INCREASED COUGH AND/OR CONGESTION       JOBST ANTI-EM KNEE LENGTH MED Misc     4 each    1 Device daily. Pressure of 15-20.       Multi-vitamin Tabs tablet      Take 2 tablets by mouth daily       nebulizer/adult mask Kit     1 kit    1 Units 4 times daily.       omeprazole 40 MG capsule    priLOSEC    90 capsule    TAKE ONE CAPSULE BY MOUTH DAILY. TAKE 30-60 MINUTES BEFORE A MEAL       * order for DME     1 Device    Dispense one handheld pulse oximeter for home use.  Check oxygen saturation daily.  Notify primary MD clinic if oxygen regularly <90% or <88%  at any time, and increase supplemental oyxgen.       * order for DME     1 Units    Equipment being ordered: Tubing for nebulizer and accessories.       * order for DME      3 L continuous.       * order for DME     2 Units    Equipment being ordered:1 pair (2 units) of knee high compression stockings, 20-30mm Hg, to be worn in the daytime only on both legs, with the toes open.       * order for DME     1 Device    Equipment being ordered:  Home Oxygen (same home and transportable oxygen tanks and equipment which the patient currently has, as of: February 26, 2017). 3 liters per minute of oxygen at rest and on ambulaton via nasal canula. Continuous. Need for 99 months.  Please fax this order, along with Dr Balderas' last OV note from 2.22.17, to: [facility]: fax: [phone].       potassium chloride 20 MEQ Packet    KLOR-CON    180 tablet    Take 20 mEq by mouth 2 times daily dispense tablets not packets.       sildenafil 20 MG tablet    REVATIO/VIAGRA    45 tablet    Take 1 tablet (20 mg) by mouth 3 times daily Per pt sister, hospital instruction take 0.5 tab 3 times daily.       simvastatin 20 MG tablet    ZOCOR    36 tablet    Take 1 tablet (20 mg) by mouth three times a week       warfarin 3 MG tablet    COUMADIN    90 tablet    TAKE 1 TABLET BY MOUTH DAILY OR AS DIRECTED BY INR NURSE OR DOCTOR       * Notice:  This list has 5 medication(s) that are the same as other medications prescribed for you. Read the directions carefully, and ask your doctor or other care provider to review them with you.

## 2017-03-24 NOTE — PROGRESS NOTES
S:  Patient here for routine care and signed the ABN before seeing us today.    O:  On exam, all the nails are thickened, elongated, discolored, with subungual debris.  Bilateral hallux IPJ calluses     A:  Onychomycosis       Calluses x 2    P:  ABN signed.  Nails debrided.  Calluses debrided with a 15 blade.  Return to clinic prn.       JIMMY YUN DPM, FACFAS

## 2017-03-24 NOTE — NURSING NOTE
Chief Complaint   Patient presents with     Toenail       Initial Pulse 80  Wt 78 kg (172 lb)  SpO2 90%  BMI 33.59 kg/m2 Estimated body mass index is 33.59 kg/(m^2) as calculated from the following:    Height as of 1/4/17: 1.524 m (5').    Weight as of this encounter: 78 kg (172 lb).  Medication Reconciliation: complete

## 2017-03-24 NOTE — LETTER
3/24/2017       RE: Sonny Vivar  [address] [facility] [address]           Dear Colleague,    Thank you for referring your patient, Sonny Vivar, to the [facility]. Please see a copy of my visit note below.    S:  Patient here for routine care and signed the ABN before seeing us today.    O:  On exam, all the nails are thickened, elongated, discolored, with subungual debris.  Bilateral hallux IPJ calluses     A:  Onychomycosis       Calluses x 2    P:  ABN signed.  Nails debrided.  Calluses debrided with a 15 blade.  Return to clinic prn.       JIMMY WEBSTER DPM, FACFAS                                                             Again, thank you for allowing me to participate in the care of your patient.        Sincerely,    Jimmy Webster DPM

## 2017-03-27 NOTE — MR AVS SNAPSHOT
Sonny Vivar   3/27/2017 2:15 PM   Anticoagulation Therapy Visit    Description:  51 year old male   Provider:  AN [facility]AG   Department:  An Nurse           INR as of 3/27/2017     Today's INR 3.1!      Anticoagulation Summary as of 3/27/2017     INR goal 2.0-3.0   Today's INR 3.1!   Full instructions 1.5 mg on Mon, Thu; 3 mg all other days   Next INR check 4/19/2017    Indications   Pulmonary embolism and infarction (H) [I26.99]  Long-term (current) use of anticoagulants [Z79.01] [Z79.01]         Your next Anticoagulation Clinic appointment(s)     Apr 19, 2017  2:45 PM CDT   Anticoagulation Visit with [facility]   [facility] ([facility]    [address] [facility] [address] [phone]              Contact Numbers     [facility]  Please call  [phone] to cancel and/or reschedule your appointment, or with any problems or questions regarding your therapy.        March 2017 Details    Sun Mon Tue Wed Thu Fri Sat        1               2               3               4                 5               6               7               8               9               10               11                 12               13               14               15               16               17               18                 19               20               21               22               23               24               25                 26               27      1.5 mg   See details      28      3 mg         29      3 mg         30      1.5 mg         31      3 mg           Date Details   03/27 This INR check               How to take your warfarin dose     To take:  1.5 mg Take 0.5 of a 3 mg tablet.    To take:  3 mg Take 1 of the 3 mg tablets.           April 2017 Details    Sun Mon Tue Wed Thu Fri Sat           1      3 mg           2      3 mg         3      1.5 mg         4      3 mg         5      3 mg         6      1.5 mg         7      3 mg         8       3 mg           9      3 mg         10      1.5 mg         11      3 mg         12      3 mg         13      1.5 mg         14      3 mg         15      3 mg           16      3 mg         17      1.5 mg         18      3 mg         19            20               21               22                 23               24               25               26               27               28               29                 30                      Date Details   No additional details    Date of next INR:  4/19/2017         How to take your warfarin dose     To take:  1.5 mg Take 0.5 of a 3 mg tablet.    To take:  3 mg Take 1 of the 3 mg tablets.

## 2017-03-27 NOTE — PROGRESS NOTES
ANTICOAGULATION FOLLOW-UP CLINIC VISIT    Patient Name:  Sonny Vivar  Date:  3/27/2017  Contact Type:  Face to Face    SUBJECTIVE:     Patient Findings     Positives No Problem Findings    Comments Here with brother in law. Reports no concern or illness. Diet may lack in vit k foods. Will continue same dose. He will try to eat a little more salad.           OBJECTIVE    INR Protime   Date Value Ref Range Status   03/27/2017 3.1 (A) 0.86 - 1.14 Final       ASSESSMENT / PLAN  INR assessment THER    Recheck INR In: 3 WEEKS    INR Location Clinic      Anticoagulation Summary as of 3/27/2017     INR goal 2.0-3.0   Today's INR 3.1!   Maintenance plan 1.5 mg (3 mg x 0.5) on Mon, Thu; 3 mg (3 mg x 1) all other days   Full instructions 1.5 mg on Mon, Thu; 3 mg all other days   Weekly total 18 mg   No change documented Beverley Contreras RN   Plan last modified Beverley Contreras RN (2/1/2017)   Next INR check 4/19/2017   Target end date     Indications   Pulmonary embolism and infarction (H) [I26.99]  Long-term (current) use of anticoagulants [Z79.01] [Z79.01]         Anticoagulation Episode Summary     INR check location     Preferred lab     Send INR reminders to [facility]    Comments       Anticoagulation Care Providers     Provider Role Specialty Phone number    Wes Camara MD Referring Internal Medicine [phone]    Genna Balderas MD Responsible Internal Medicine [phone]            See the Encounter Report to view Anticoagulation Flowsheet and Dosing Calendar (Go to Encounters tab in chart review, and find the Anticoagulation Therapy Visit)        Beverley Contreras RN

## 2017-03-28 NOTE — TELEPHONE ENCOUNTER
Patient needs refill on Warfarin 3 mg. Please call to notify. Ok to leave a message. Sister leaves for out of town and needs this prescription filled tonight. Patient is down syndrome. Please call as soon as possible.

## 2017-04-04 NOTE — TELEPHONE ENCOUNTER
Holly from [facility] care home for patient is calling to ask provider to refill medication for patient. Patient is there for one more week and has lost his pill case which is set up from care giver who is out of town, patent would need 6 days of medication to be refilled. This would be all the medication patient takes daily  Holly has been in contact with the pharmacy for this  Please call to discuss  Thank you

## 2017-04-10 NOTE — TELEPHONE ENCOUNTER
Letter or MyChart message sent to remind patient of cancer screenings which are due.    Coreen Vergara, CMA

## 2017-04-19 NOTE — PATIENT INSTRUCTIONS
Please contact [facility] regarding your question about the medihoney dressing and follow-up with them on 5.11.17, as planned.  Please continue to wear the compression stockings, as you have been.  Please weigh yourself daily and call the clinic ([phone]) if you gain more than 3 pounds in 1 day or more than 5 pounds in 1 week.    You have indicated that you will start using a BiPAP machine tomorrow.  We will plan on seeing you back in 2 months, and do labwork (a basic metabolic panel) at that appointment.

## 2017-04-19 NOTE — NURSING NOTE
Chief Complaint   Patient presents with     RECHECK     Chronic conditions       Initial /66 (BP Location: Right arm, Patient Position: Chair, Cuff Size: Adult Regular)  Pulse 86  Temp 96.7  F (35.9  C) (Oral)  Wt 176 lb 3.2 oz (79.9 kg)  BMI 34.41 kg/m2 Estimated body mass index is 34.41 kg/(m^2) as calculated from the following:    Height as of 1/4/17: 5' (1.524 m).    Weight as of this encounter: 176 lb 3.2 oz (79.9 kg).  Medication Reconciliation: complete    Coreen Vergara CMA

## 2017-04-19 NOTE — PROGRESS NOTES
ANTICOAGULATION FOLLOW-UP CLINIC VISIT    Patient Name:  Sonny Vivar  Date:  4/19/2017  Contact Type:  Face to Face    SUBJECTIVE:     Patient Findings     Positives Unexplained INR or factor level change    Comments NR 4.5. Patient was in respite care for about 10 days while his sister was gone. Not aware of any medication errors. Diet may have been different. Treating some sores on his legs. No change in meds. No bleeding or bruising reported. Here with sister. Hold dose today. Recheck in 1 wk.            OBJECTIVE    INR Protime   Date Value Ref Range Status   04/19/2017 4.5 (A) 0.86 - 1.14 Final       ASSESSMENT / PLAN  INR assessment SUPRA    Recheck INR In: 1 WEEK    INR Location Clinic      Anticoagulation Summary as of 4/19/2017     INR goal 2.0-3.0   Today's INR 4.5!   Maintenance plan 1.5 mg (3 mg x 0.5) on Mon, Thu; 3 mg (3 mg x 1) all other days   Full instructions 4/19: Hold; Otherwise 1.5 mg on Mon, Thu; 3 mg all other days   Weekly total 18 mg   Plan last modified Beverley Contreras RN (2/1/2017)   Next INR check 4/26/2017   Target end date     Indications   Pulmonary embolism and infarction (H) [I26.99]  Long-term (current) use of anticoagulants [Z79.01] [Z79.01]         Anticoagulation Episode Summary     INR check location     Preferred lab     Send INR reminders to [facility]    Comments       Anticoagulation Care Providers     Provider Role Specialty Phone number    Wes Camara MD Referring Internal Medicine [phone]    Genna Balderas MD Responsible Internal Medicine [phone]            See the Encounter Report to view Anticoagulation Flowsheet and Dosing Calendar (Go to Encounters tab in chart review, and find the Anticoagulation Therapy Visit)        Beverley Contreras RN

## 2017-04-19 NOTE — MR AVS SNAPSHOT
After Visit Summary   4/19/2017    Sonny Vivar    MRN: [id number]           Patient Information     Date Of Birth          1965        Visit Information        Provider Department      4/19/2017 3:00 PM Genna Balderas MD [facility]        Care Instructions    Please contact [facility] regarding your question about the medihoney dressing and follow-up with them on 5.11.17, as planned.  Please continue to wear the compression stockings, as you have been.  Please weigh yourself daily and call the clinic ([phone]) if you gain more than 3 pounds in 1 day or more than 5 pounds in 1 week.    You have indicated that you will start using a BiPAP machine tomorrow.  We will plan on seeing you back in 2 months, and do labwork (a basic metabolic panel) at that appointment.             Follow-ups after your visit        Your next 10 appointments already scheduled     Apr 26, 2017  2:45 PM CDT   Anticoagulation Visit with AN ANTI COAG   [facility]    [address] [facility] [address] [phone]              Who to contact     If you have questions or need follow up information about today's clinic visit or your schedule please contact [facility] directly at [phone].  Normal or non-critical lab and imaging results will be communicated to you by MyChart, letter or phone within 4 business days after the clinic has received the results. If you do not hear from us within 7 days, please contact the clinic through MyChart or phone. If you have a critical or abnormal lab result, we will notify you by phone as soon as possible.  Submit refill requests through [employer] or call your pharmacy and they will forward the refill request to us. Please allow 3 business days for your refill to be completed.          Additional Information About Your Visit        [employer] Information     [employer] gives you secure  access to your electronic health record. If you see a primary care provider, you can also send messages to your care team and make appointments. If you have questions, please call your primary care clinic.  If you do not have a primary care provider, please call [phone] and they will assist you.        Care EveryWhere ID     This is your Care EveryWhere ID. This could be used by other organizations to access your [place] medical records  [id number]        Your Vitals Were     Pulse Temperature BMI (Body Mass Index)             86 96.7  F (35.9  C) (Oral) 34.41 kg/m2          Blood Pressure from Last 3 Encounters:   04/19/17 100/66   02/22/17 112/74   02/15/17 116/70    Weight from Last 3 Encounters:   04/19/17 176 lb 3.2 oz (79.9 kg)   03/24/17 172 lb (78 kg)   02/22/17 172 lb 3.2 oz (78.1 kg)              Today, you had the following     No orders found for display         Today's Medication Changes          These changes are accurate as of: 4/19/17  4:40 PM.  If you have any questions, ask your nurse or doctor.               These medicines have changed or have updated prescriptions.        Dose/Directions    warfarin 3 MG tablet   Commonly known as:  COUMADIN   This may have changed:  Another medication with the same name was removed. Continue taking this medication, and follow the directions you see here.   Used for:  Long term (current) use of anticoagulants, Pulmonary embolism and infarction (H)   Changed by:  Genna Balderas MD        1.5 mg on Mon, Thu; 3 mg all other days. Or as directed by INR nurse   Quantity:  14 tablet   Refills:  0                Primary Care Provider Office Phone # Fax #    Genna Balderas [phone]       [facility] [address] [facility] [address]        Thank you!     Thank you for choosing [facility]  for your care. Our goal is always to provide you with excellent care. Hearing back from our  patients is one way we can continue to improve our services. Please take a few minutes to complete the written survey that you may receive in the mail after your visit with us. Thank you!             Your Updated Medication List - Protect others around you: Learn how to safely use, store and throw away your medicines at www.disposemymeds.org.          This list is accurate as of: 4/19/17  4:40 PM.  Always use your most recent med list.                   Brand Name Dispense Instructions for use    ACE/ARB NOT PRESCRIBED (INTENTIONAL)      Please choose reason not prescribed, below       acetaminophen 325 MG tablet    TYLENOL     Take 325-650 mg by mouth every 6 hours as needed for mild pain       acetaZOLAMIDE 250 MG tablet    DIAMOX    7 tablet    Take 1 tablet (250 mg) by mouth daily       allopurinol 300 MG tablet    ZYLOPRIM    7 tablet    Take 1 tablet (300 mg) by mouth daily       amoxicillin 500 MG capsule    AMOXIL    21 capsule    TAKE ONE CAPSULE BY MOUTH THREE TIMES DAILY. TWO WEEKS ON THEN TWO WEEKS OFF       BETA BLOCKER NOT PRESCRIBED (INTENTIONAL)      Beta Blocker not prescribed intentionally due to Evidence of fluid overload or volume depletion       bumetanide 2 MG tablet    BUMEX    14 tablet    Take 1 tablet (2 mg) by mouth 2 times daily       ipratropium - albuterol 0.5 mg/2.5 mg/3 mL 0.5-2.5 (3) MG/3ML neb solution    DUONEB    360 mL    INHALE 1 VIAL BY NEBULIZATION EVERY 6 HOURS AS NEEDED. USE FOR INCREASED COUGH AND/OR CONGESTION       JOBST ANTI-EM KNEE LENGTH MED Misc     4 each    1 Device daily. Pressure of 15-20.       Multi-vitamin Tabs tablet      Take 2 tablets by mouth daily       nebulizer/adult mask Kit     1 kit    1 Units 4 times daily.       omeprazole 40 MG capsule    priLOSEC    7 capsule    TAKE ONE CAPSULE BY MOUTH DAILY. TAKE 30-60 MINUTES BEFORE A MEAL       * order for DME     1 Device    Dispense one handheld pulse oximeter for home use.  Check oxygen saturation daily.   Notify primary MD clinic if oxygen regularly <90% or <88% at any time, and increase supplemental oyxgen.       * order for DME     1 Units    Equipment being ordered: Tubing for nebulizer and accessories.       * order for DME      3 L continuous.       * order for DME     2 Units    Equipment being ordered:1 pair (2 units) of knee high compression stockings, 20-30mm Hg, to be worn in the daytime only on both legs, with the toes open.       * order for DME     1 Device    Equipment being ordered:  Home Oxygen (same home and transportable oxygen tanks and equipment which the patient currently has, as of: February 26, 2017). 3 liters per minute of oxygen at rest and on ambulaton via nasal canula. Continuous. Need for 99 months.  Please fax this order, along with Dr Balderas' last OV note from 2.22.17, to: [facility]: fax: [phone].       potassium chloride 20 MEQ Packet    KLOR-CON    14 tablet    Take 20 mEq by mouth 2 times daily dispense tablets not packets.       sildenafil 20 MG tablet    REVATIO/VIAGRA    21 tablet    Take 1 tablet (20 mg) by mouth 3 times daily 2/22/2017 patient takes one tablet 3 times daily       simvastatin 20 MG tablet    ZOCOR    3 tablet    Take 1 tablet (20 mg) by mouth three times a week       warfarin 3 MG tablet    COUMADIN    14 tablet    1.5 mg on Mon, Thu; 3 mg all other days. Or as directed by INR nurse       * Notice:  This list has 5 medication(s) that are the same as other medications prescribed for you. Read the directions carefully, and ask your doctor or other care provider to review them with you.

## 2017-04-19 NOTE — PROGRESS NOTES
SUBJECTIVE:                                                    Sonny Vivar is a 51 year old male who presents to clinic today for the following health issues:    PMH as noted, including recurrent DVT/PE with severe Pulm hypertension, and chronic A fib on warfarin, on Oxygen via a NC,  3L/min at rest and 5-6 L/min when ambulatory, BEAR, obesity-hypoventilation syndrome-followed by [facility], developmental delay,       Hypertension Follow-up      Outpatient blood pressures are not being checked.    Low Salt Diet: no added salt       Amount of exercise or physical activity: None    Problems taking medications regularly: No    Medication side effects: none    Diet: regular (no restrictions) and low salt    Patient denies chest pain, chest pressure; shortness of breath is at baseline; no: palpitations, headaches, visual changes, or any noted lower extremity swelling.     Heart Failure Follow-up    Symptoms:    Shortness of breath: happens with rest and exertion - stable    Lower extremity edema: stable     Chest pain: No    Using more pillows than normal: No bed elevates    Cough at night: No    Weight:    Checking weight daily: Yes    Weight change: none stabel 172-174    Cardiology visits, ER/UC, or hospital admissions since last visit: None    Medication side effects: none      Per the patient's sister, the patient had another polysomnography recently and is getting a BiPAP tomorrow.   His polysomnography was at [place].    Lower extremity wounds:  Per the sister, he has been following with Wound care at [facility], he had a recent U/S which per the sister's description, showed chronic venous insufficiency (also, see CareEverywhere). They discussed possible treatments, with wound care or with a doctor, and they were considering a procedure for the veins and decided against it.    He has compression stockings, at a pressure of 15-20mm Hg.      Problem list and histories reviewed & adjusted, as  indicated.  Additional history: as documented    Patient Active Problem List   Diagnosis     Mental handicap     Pulmonary hypertension (H)     Acquired bronchiectasis (H)     Hypoventilation associated with obesity (H)     Post-phlebitic syndrome     Hyperlipidemia LDL goal <130     Dry eye syndrome     Punctate keratitis due to dry eyes and O2 use     Posterior subcapsular polar senile cataract     Macular scar     Obesity, morbid (more than 100 lbs over ideal weight or BMI > 40) (H)     Long-term (current) use of anticoagulants, INR goal 2.0-3.0     On supplemental oxygen therapy     Trisomy 21 syndrome     Abnormal CT scan of lung     Recurrent pulmonary embolism (H)     Anisometropia     Encounter for counseling     Primary hypercoagulable state (H)     Hypertension, benign     Pulmonary embolism and infarction (H)     Venous (peripheral) insufficiency     Obstructive sleep apnea     Encounter for counseling     Dependence on supplemental oxygen     Bronchiectasis (H)     Long-term (current) use of anticoagulants [Z79.01]     Idiopathic gout, unspecified chronicity, unspecified site     Gastroesophageal reflux disease, esophagitis presence not specified     Chronic atrial fibrillation (H)     Bilateral lower extremity edema     Deep vein thrombosis (DVT) of other vein of lower extremity (H)     Acute on chronic systolic heart failure (H)     Advance care planning     Health Care Home     BP check     Acute on chronic respiratory failure with hypoxia and hypercapnia (H)     Past Surgical History:   Procedure Laterality Date     SURGICAL HISTORY OF -       thoractomy for lung infection       Social History   Substance Use Topics     Smoking status: Never Smoker     Smokeless tobacco: Never Used      Comment: Lives in smoke free household     Alcohol use No     Family History   Problem Relation Age of Onset     HEART DISEASE Mother      Unknown/Adopted No family hx of      CANCER No family hx of      DIABETES No  family hx of      Hypertension No family hx of      CEREBROVASCULAR DISEASE No family hx of      Thyroid Disease No family hx of      Glaucoma No family hx of      Macular Degeneration No family hx of          Current Outpatient Prescriptions   Medication Sig Dispense Refill     amoxicillin (AMOXIL) 500 MG capsule TAKE ONE CAPSULE BY MOUTH THREE TIMES DAILY. TWO WEEKS ON THEN TWO WEEKS OFF 21 capsule 0     warfarin (COUMADIN) 3 MG tablet 1.5 mg on Mon, Thu; 3 mg all other days. Or as directed by INR nurse 14 tablet 0     acetaZOLAMIDE (DIAMOX) 250 MG tablet Take 1 tablet (250 mg) by mouth daily 7 tablet 0     potassium chloride (KLOR-CON) 20 MEQ Packet Take 20 mEq by mouth 2 times daily dispense tablets not packets. 14 tablet 0     bumetanide (BUMEX) 2 MG tablet Take 1 tablet (2 mg) by mouth 2 times daily 14 tablet 0     omeprazole (PRILOSEC) 40 MG capsule TAKE ONE CAPSULE BY MOUTH DAILY. TAKE 30-60 MINUTES BEFORE A MEAL 7 capsule 0     sildenafil (REVATIO/VIAGRA) 20 MG tablet Take 1 tablet (20 mg) by mouth 3 times daily 2/22/2017 patient takes one tablet 3 times daily 21 tablet 0     allopurinol (ZYLOPRIM) 300 MG tablet Take 1 tablet (300 mg) by mouth daily 7 tablet 0     simvastatin (ZOCOR) 20 MG tablet Take 1 tablet (20 mg) by mouth three times a week 3 tablet 0     BETA BLOCKER NOT PRESCRIBED, INTENTIONAL, Beta Blocker not prescribed intentionally due to Evidence of fluid overload or volume depletion  0     ACE/ARB NOT PRESCRIBED, INTENTIONAL, Please choose reason not prescribed, below       order for DME Equipment being ordered:   Home Oxygen (same home and transportable oxygen tanks and equipment which the patient currently has, as of: February 26, 2017).  3 liters per minute of oxygen at rest and on ambulaton via nasal canula.  Continuous.  Need for 99 months.    Please fax this order, along with Dr Balderas' last OV note from 2.22.17, to: [facility]: fax: [phone]. 4 Device 0      acetaminophen (TYLENOL) 325 MG tablet Take 325-650 mg by mouth every 6 hours as needed for mild pain       multivitamin, therapeutic with minerals (MULTI-VITAMIN) TABS Take 2 tablets by mouth daily        order for DME Equipment being ordered:1 pair (2 units) of knee high compression stockings, 20-30mm Hg, to be worn in the daytime only on both legs, with the toes open. 2 Units 1     ipratropium - albuterol 0.5 mg/2.5 mg/3 mL (DUONEB) 0.5-2.5 (3) MG/3ML nebulization INHALE 1 VIAL BY NEBULIZATION EVERY 6 HOURS AS NEEDED. USE FOR INCREASED COUGH AND/OR CONGESTION 360 mL 1     Elastic Bandages & Supports (JOBST ANTI-EM KNEE LENGTH MED) MISC 1 Device daily. Pressure of 15-20. 4 each 1     ORDER FOR DME Equipment being ordered: Tubing for nebulizer and accessories. 1 Units 1     ORDER FOR DME Dispense one handheld pulse oximeter for home use.  Check oxygen saturation daily.  Notify primary MD clinic if oxygen regularly <90% or <88% at any time, and increase supplemental oyxgen. 1 Device 0     Respiratory Therapy Supplies (NEBULIZER/ADULT MASK) KIT 1 Units 4 times daily. 1 kit 0     ORDER FOR DME 3 L continuous.         Reviewed and updated as needed this visit by clinical staff  Tobacco  Allergies  Meds  Med Hx  Surg Hx  Fam Hx  Soc Hx      Reviewed and updated as needed this visit by Provider         ==============================================================  ROS:  Constitutional, HEENT, cardiovascular, pulmonary, GI, , musculoskeletal, neuro, skin, endocrine and psych systems are negative, except as otherwise noted.       OBJECTIVE:                                                    /66 (BP Location: Right arm, Patient Position: Chair, Cuff Size: Adult Regular)  Pulse 86  Temp 96.7  F (35.9  C) (Oral)  Wt 176 lb 3.2 oz (79.9 kg)  SpO2 (P) 95%  BMI 34.41 kg/m2  Body mass index is 34.41 kg/(m^2).     GENERAL APPEARANCE: healthy, alert and in no distress; on O2 via NC.  EYES: Eyes grossly normal to  inspection, and conjunctivae and sclerae normal  HENT: head normocephalic and atraumatic and mouth without ulcers or lesions, oropharynx clear and oral mucous membranes moist  NECK: no noticeable adenopathy, no asymmetry, masses, or scars    RESP: lungs clear to auscultation - no rales, rhonchi or wheezes  CV: regular rate and rhythm, normal S1 S2, no S3 or S4, no murmur, click or rub, +1-+2 pitting b/l pedal peripheral edema with chronic venous stasis changes.  ABDOMEN: soft, nontender, no hepatosplenomegaly, no masses and bowel sounds normal  MS: no musculoskeletal defects are noted and gait is age appropriate without ataxia  SKIN:   There is a superficial and < 1cm wide venous stasis ulcer and surrounding dry, scaly skin on the left lower extremity. W/o e/o cellulitis. And a 1.5 cm venous stasis ulcer on the dorsal aspect of the right foot.  o/w, no suspicious lesions or rashes  NEURO: mentation intact and speech normal  PSYCH: mentation appears normal and affect normal/bright.      Results for orders placed or performed in visit on 04/19/17   INR point of care   Result Value Ref Range    INR Protime 4.5 (A) 0.86 - 1.14      ASSESSMENT/PLAN:                                                        ICD-10-CM    1. Right-sided heart failure (H) I50.9    2. Pulmonary hypertension (H) I27.2    3. Venous stasis ulcers, unspecified laterality (H) I83.009     L97.909      (I50.9) Right-sided heart failure (H)  (primary encounter diagnosis)  Comment: currently euvolemic  Plan: Continue current regimen.     (I27.2) Pulmonary hypertension (H)  Comment: followed by Pulm at [facility] and Cardiology within   Plan: Continue current regimen.     (I83.009,  L97.909) Venous stasis ulcers, unspecified laterality (H)  Comment:   being handled by Vascular surgeon Dr Araiza at [place] and the [facility] wound care nurses-improved from previous  See CareEverywhere-Dr Araiza has advised the patient to hold on on surgical intervention   Plan:  Continue current regimen.    As per orders above and patient instructions below.     Patient Instructions   Please contact Wound Care at [facility] regarding your question about the medihoney dressing and follow-up with them on 5.11.17, as planned.  Please continue to wear the compression stockings, as you have been.  Please weigh yourself daily and call the clinic ([phone]) if you gain more than 3 pounds in 1 day or more than 5 pounds in 1 week.    You have indicated that you will start using a BiPAP machine tomorrow.  We will plan on seeing you back in 2 months, and do labwork (a basic metabolic panel) at that appointment.                       Genna Balderas MD  [facility]

## 2017-04-19 NOTE — MR AVS SNAPSHOT
Sonny Vivar   4/19/2017 2:45 PM   Anticoagulation Therapy Visit    Description:  51 year old male   Provider:  AN ANTI COAG   Department:  An Nurse           INR as of 4/19/2017     Today's INR 4.5!      Anticoagulation Summary as of 4/19/2017     INR goal 2.0-3.0   Today's INR 4.5!   Full instructions 4/19: Hold; Otherwise 1.5 mg on Mon, Thu; 3 mg all other days   Next INR check 4/26/2017    Indications   Pulmonary embolism and infarction (H) [I26.99]  Long-term (current) use of anticoagulants [Z79.01] [Z79.01]         Your next Anticoagulation Clinic appointment(s)     Apr 26, 2017  2:45 PM CDT   [facility]   [facility]    [address] [facility] [id number] [phone]              Contact Numbers     [facility]  Please call  [phone] to cancel and/or reschedule your appointment, or with any problems or questions regarding your therapy.        April 2017 Details    Sun Mon Tue Wed Thu Fri Sat           1                 2               3               4               5               6               7               8                 9               10               11               12               13               14               15                 16               17               18               19      Hold   See details      20      1.5 mg         21      3 mg         22      3 mg           23      3 mg         24      1.5 mg         25      3 mg         26            27               28               29                 30                      Date Details   04/19 This INR check       Date of next INR:  4/26/2017         How to take your warfarin dose     To take:  1.5 mg Take 0.5 of a 3 mg tablet.    To take:  3 mg Take 1 of the 3 mg tablets.    Hold Do not take your warfarin dose. See the Details table to the right for additional instructions.

## 2017-04-23 PROBLEM — L97.909: Status: ACTIVE | Noted: 2017-01-01

## 2017-04-23 PROBLEM — I83.009: Status: ACTIVE | Noted: 2017-01-01

## 2017-04-23 PROBLEM — I50.810 RIGHT-SIDED HEART FAILURE (H): Status: ACTIVE | Noted: 2017-01-01

## 2017-04-26 NOTE — PROGRESS NOTES
ANTICOAGULATION FOLLOW-UP CLINIC VISIT    Patient Name:  Sonny Vivar  Date:  4/26/2017  Contact Type:  Face to Face    SUBJECTIVE:     Patient Findings     Positives No Problem Findings    Comments Here with brother in law. INR 3.3. This is down from 4.5 a week ago. No changes. Will adjust weekly dose and recheck in 2 wks.            OBJECTIVE    INR Protime   Date Value Ref Range Status   04/26/2017 3.3 (A) 0.86 - 1.14 Final       ASSESSMENT / PLAN  INR assessment SUPRA    Recheck INR In: 2 WEEKS    INR Location Clinic      Anticoagulation Summary as of 4/26/2017     INR goal 2.0-3.0   Today's INR 3.3!   Maintenance plan 1.5 mg (3 mg x 0.5) on Mon, Wed, Fri; 3 mg (3 mg x 1) all other days   Full instructions 1.5 mg on Mon, Wed, Fri; 3 mg all other days   Weekly total 16.5 mg   Plan last modified Beverley Contreras RN (4/26/2017)   Next INR check 5/10/2017   Target end date     Indications   Pulmonary embolism and infarction (H) [I26.99]  Long-term (current) use of anticoagulants [Z79.01] [Z79.01]         Anticoagulation Episode Summary     INR check location     Preferred lab     Send INR reminders to [facility]    Comments       Anticoagulation Care Providers     Provider Role Specialty Phone number    Wes Camara MD Referring Internal Medicine [phone]    Genna Balderas MD Responsible Internal Medicine [phone]            See the Encounter Report to view Anticoagulation Flowsheet and Dosing Calendar (Go to Encounters tab in chart review, and find the Anticoagulation Therapy Visit)      Beverley Contreras RN

## 2017-04-26 NOTE — MR AVS SNAPSHOT
Sonny Vivar   4/26/2017 2:45 PM   Anticoagulation Therapy Visit    Description:  51 year old male   Provider:  AN ANTI COAG   Department:  An Nurse           INR as of 4/26/2017     Today's INR 3.3!      Anticoagulation Summary as of 4/26/2017     INR goal 2.0-3.0   Today's INR 3.3!   Full instructions 1.5 mg on Mon, Wed, Fri; 3 mg all other days   Next INR check 5/10/2017    Indications   Pulmonary embolism and infarction (H) [I26.99]  Long-term (current) use of anticoagulants [Z79.01] [Z79.01]         Your next Anticoagulation Clinic appointment(s)     May 10, 2017  3:00 PM CDT   [facility] AN ANTI [facility]AG   [facility]    [address] [facility] [address] [phone]              Contact Numbers     [facility]  Please call  [phone] to cancel and/or reschedule your appointment, or with any problems or questions regarding your therapy.        April 2017 Details    Sun Mon Tue Wed Thu Fri Sat           1                 2               3               4               5               6               7               8                 9               10               11               12               13               14               15                 16               17               18               19               20               21               22                 23               24               25               26      1.5 mg   See details      27      3 mg         28      1.5 mg         29      3 mg           30      3 mg                Date Details   04/26 This INR check               How to take your warfarin dose     To take:  1.5 mg Take 0.5 of a 3 mg tablet.    To take:  3 mg Take 1 of the 3 mg tablets.           May 2017 Details    Sun Mon Tue Wed Thu Fri Sat      1      1.5 mg         2      3 mg         3      1.5 mg         4      3 mg         5      1.5 mg         6      3 mg           7      3 mg         8      1.5  mg         9      3 mg         10            11               12               13                 14               15               16               17               18               19               20                 21               22               23               24               25               26               27                 28               29               30               31                   Date Details   No additional details    Date of next INR:  5/10/2017         How to take your warfarin dose     To take:  1.5 mg Take 0.5 of a 3 mg tablet.    To take:  3 mg Take 1 of the 3 mg tablets.

## 2017-05-10 NOTE — PROGRESS NOTES
ANTICOAGULATION FOLLOW-UP CLINIC VISIT    Patient Name:  Sonny Vivar  Date:  5/10/2017  Contact Type:  Face to Face    SUBJECTIVE:     Patient Findings     Positives No Problem Findings           OBJECTIVE    INR Protime   Date Value Ref Range Status   05/10/2017 3.2 (A) 0.86 - 1.14 Final       ASSESSMENT / PLAN  INR assessment SUPRA    Recheck INR In: 2 WEEKS    INR Location Clinic      Anticoagulation Summary as of 5/10/2017     INR goal 2.0-3.0   Today's INR 3.2!   Maintenance plan 3 mg (3 mg x 1) on Tue, Thu, Sat; 1.5 mg (3 mg x 0.5) all other days   Full instructions 3 mg on Tue, Thu, Sat; 1.5 mg all other days   Weekly total 15 mg   Plan last modified Edie De Jesus RN (5/10/2017)   Next INR check 5/24/2017   Target end date     Indications   Pulmonary embolism and infarction (H) [I26.99]  Long-term (current) use of anticoagulants [Z79.01] [Z79.01]         Anticoagulation Episode Summary     INR check location     Preferred lab     Send INR reminders to [facility]    Comments       Anticoagulation Care Providers     Provider Role Specialty Phone number    Wes Camara MD Referring Internal Medicine [phone]    Genna Balderas MD Responsible Internal Medicine [phone]            See the Encounter Report to view Anticoagulation Flowsheet and Dosing Calendar (Go to Encounters tab in chart review, and find the Anticoagulation Therapy Visit)        Edie De Jesus RN

## 2017-05-10 NOTE — MR AVS SNAPSHOT
Sonny Vivar   5/10/2017 3:00 PM   Anticoagulation Therapy Visit    Description:  51 year old male   Provider:  AN ANTI COAG   Department:  An Nurse           INR as of 5/10/2017     Today's INR 3.2!      Anticoagulation Summary as of 5/10/2017     INR goal 2.0-3.0   Today's INR 3.2!   Full instructions 3 mg on Tue, Thu, Sat; 1.5 mg all other days   Next INR check 5/24/2017    Indications   Pulmonary embolism and infarction (H) [I26.99]  Long-term (current) use of anticoagulants [Z79.01] [Z79.01]         Your next Anticoagulation Clinic appointment(s)     May 24, 2017  3:00 PM CDT   [facility]agulation [facility] AN ANTI [facility]AG   [facility]    [address] [facility] [address] [phone]              Contact Numbers     [facility]  Please call  [phone] to cancel and/or reschedule your appointment, or with any problems or questions regarding your therapy.        May 2017 Details    Sun Mon Tue Wed Thu Fri Sat      1               2               3               4               5               6                 7               8               9               10      1.5 mg   See details      11      3 mg         12      1.5 mg         13      3 mg           14      1.5 mg         15      1.5 mg         16      3 mg         17      1.5 mg         18      3 mg         19      1.5 mg         20      3 mg           21      1.5 mg         22      1.5 mg         23      3 mg         24            25               26               27                 28               29               30               31                   Date Details   05/10 This INR check       Date of next INR:  5/24/2017         How to take your warfarin dose     To take:  1.5 mg Take 0.5 of a 3 mg tablet.    To take:  3 mg Take 1 of the 3 mg tablets.

## 2017-05-22 NOTE — TELEPHONE ENCOUNTER
Last Written Prescription Date: 4.5.17  Last Fill Quantity: 3, # refills: 0  Last Office Visit with G, P or  Health prescribing provider: 2.22.17  Next 5 appointments (look out 90 days)     Jun 21, 2017  3:00 PM CDT   Office Visit with Genna Balderas MD   [facility]    [address] [facility] [id number] [phone]                   Lab Results   Component Value Date    CHOL 135 05/19/2015     Lab Results   Component Value Date    HDL 38 05/19/2015     Lab Results   Component Value Date    LDL 62 09/09/2016    LDL 65 05/19/2015     Lab Results   Component Value Date    TRIG 160 05/19/2015     Lab Results   Component Value Date    CHOLHDLRATIO 3.6 05/19/2015

## 2017-05-24 NOTE — MR AVS SNAPSHOT
Sonny Vivar   5/24/2017 3:00 PM   Anticoagulation Therapy Visit    Description:  51 year old male   Provider:  AN ANTI COAG   Department:  An Nurse           INR as of 5/24/2017     Today's INR 1.9!      Anticoagulation Summary as of 5/24/2017     INR goal 2.0-3.0   Today's INR 1.9!   Full instructions 1.5 mg on Mon, Wed, Fri; 3 mg all other days   Next INR check 6/7/2017    Indications   Pulmonary embolism and infarction (H) [I26.99]  Long-term (current) use of anticoagulants [Z79.01] [Z79.01]         Your next Anticoagulation Clinic appointment(s)     Jun 07, 2017  2:30 PM CDT   Anticoagulation Visit with AN ANTI COAG   [facility]    [address] [facility] [address] [phone]              Contact Numbers     [facility]  Please call  [phone] to cancel and/or reschedule your appointment, or with any problems or questions regarding your therapy.        May 2017 Details    Sun Mon Tue Wed Thu Fri Sat      1               2               3               4               5               6                 7               8               9               10               11               12               13                 14               15               16               17               18               19               20                 21               22               23               24      1.5 mg   See details      25      3 mg         26      1.5 mg         27      3 mg           28      3 mg         29      1.5 mg         30      3 mg         31      1.5 mg             Date Details   05/24 This INR check               How to take your warfarin dose     To take:  1.5 mg Take 0.5 of a 3 mg tablet.    To take:  3 mg Take 1 of the 3 mg tablets.           June 2017 Details    Sun Mon Tue Wed Thu Fri Sat         1      3 mg         2      1.5 mg         3      3 mg           4      3 mg         5      1.5 mg         6      3 mg         7             8               9               10                 11               12               13               14               15               16               17                 18               19               20               21               22               23               24                 25               26               27               28               29               30                 Date Details   No additional details    Date of next INR:  6/7/2017         How to take your warfarin dose     To take:  1.5 mg Take 0.5 of a 3 mg tablet.    To take:  3 mg Take 1 of the 3 mg tablets.

## 2017-05-24 NOTE — PROGRESS NOTES
ANTICOAGULATION FOLLOW-UP CLINIC VISIT    Patient Name:  Sonny Vivar  Date:  5/24/2017  Contact Type:  Face to Face    SUBJECTIVE:     Patient Findings     Comments Sub at 1.9 with dose change. No missed dose or other change. Will resume previous dose. Recheck in 2 wks.            OBJECTIVE    INR Protime   Date Value Ref Range Status   05/24/2017 1.9 (A) 0.86 - 1.14 Final       ASSESSMENT / PLAN  INR assessment SUB    Recheck INR In: 2 WEEKS    INR Location Clinic      Anticoagulation Summary as of 5/24/2017     INR goal 2.0-3.0   Today's INR 1.9!   Maintenance plan 1.5 mg (3 mg x 0.5) on Mon, Wed, Fri; 3 mg (3 mg x 1) all other days   Full instructions 1.5 mg on Mon, Wed, Fri; 3 mg all other days   Weekly total 16.5 mg   Plan last modified Beverley Contreras RN (5/24/2017)   Next INR check 6/7/2017   Target end date     Indications   Pulmonary embolism and infarction (H) [I26.99]  Long-term (current) use of anticoagulants [Z79.01] [Z79.01]         Anticoagulation Episode Summary     INR check location     Preferred lab     Send INR reminders to [facility]    Comments       Anticoagulation Care Providers     Provider Role Specialty Phone number    Wes Camara MD Referring Internal Medicine [phone]    Genna Balderas MD Responsible Internal Medicine [phone]            See the Encounter Report to view Anticoagulation Flowsheet and Dosing Calendar (Go to Encounters tab in chart review, and find the Anticoagulation Therapy Visit)    Dosage adjustment made based on physician directed care plan.    Beverley Contreras RN

## 2017-06-07 NOTE — PROGRESS NOTES
ANTICOAGULATION FOLLOW-UP CLINIC VISIT    Patient Name:  Sonny Vivar  Date:  6/7/2017  Contact Type:  Face to Face    SUBJECTIVE:     Patient Findings     Comments Supra . Sister reports no changes. Admits he does not eat much salad but this is not different. Dose adjusted and recheck in 2 wks.            OBJECTIVE    INR Protime   Date Value Ref Range Status   06/07/2017 3.7 (A) 0.86 - 1.14 Final       ASSESSMENT / PLAN  INR assessment SUPRA    Recheck INR In: 2 WEEKS    INR Location Clinic      Anticoagulation Summary as of 6/7/2017     INR goal 2.0-3.0   Today's INR 3.7!   Maintenance plan 3 mg (3 mg x 1) on Sun, Tue, Sat; 1.5 mg (3 mg x 0.5) all other days   Full instructions 3 mg on Sun, Tue, Sat; 1.5 mg all other days   Weekly total 15 mg   Plan last modified Beverley Contreras RN (6/7/2017)   Next INR check 6/21/2017   Target end date     Indications   Pulmonary embolism and infarction (H) [I26.99]  Long-term (current) use of anticoagulants [Z79.01] [Z79.01]         Anticoagulation Episode Summary     INR check location     Preferred lab     Send INR reminders to [facility]    Comments       Anticoagulation Care Providers     Provider Role Specialty Phone number    Wes Camara MD Referring Internal Medicine [phone]    Genna Balderas MD Responsible Internal Medicine [phone]            See the Encounter Report to view Anticoagulation Flowsheet and Dosing Calendar (Go to Encounters tab in chart review, and find the Anticoagulation Therapy Visit)    Dosage adjustment made based on physician directed care plan.    Beverley Contreras RN

## 2017-06-07 NOTE — MR AVS SNAPSHOT
Sonny Vivar   6/7/2017 2:30 PM   Anticoagulation Therapy Visit    Description:  51 year old male   Provider:  [facility]   Department:  An Nurse           INR as of 6/7/2017     Today's INR 3.7!      Anticoagulation Summary as of 6/7/2017     INR goal 2.0-3.0   Today's INR 3.7!   Full instructions 3 mg on Sun, Tue, Sat; 1.5 mg all other days   Next INR check 6/21/2017    Indications   Pulmonary embolism and infarction (H) [I26.99]  Long-term (current) use of anticoagulants [Z79.01] [Z79.01]         Your next Anticoagulation Clinic appointment(s)     Jun 21, 2017  2:45 PM CDT   [facility]   [facility]    [address] [facility] [address] [phone]              Contact Numbers     [facility]  Please call  [phone] to cancel and/or reschedule your appointment, or with any problems or questions regarding your therapy.        June 2017 Details    Sun Mon Tue Wed Thu Fri Sat         1               2               3                 4               5               6               7      1.5 mg   See details      8      1.5 mg         9      1.5 mg         10      3 mg           11      3 mg         12      1.5 mg         13      3 mg         14      1.5 mg         15      1.5 mg         16      1.5 mg         17      3 mg           18      3 mg         19      1.5 mg         20      3 mg         21            22               23               24                 25               26               27               28               29               30                 Date Details   06/07 This INR check       Date of next INR:  6/21/2017         How to take your warfarin dose     To take:  1.5 mg Take 0.5 of a 3 mg tablet.    To take:  3 mg Take 1 of the 3 mg tablets.

## 2017-06-21 NOTE — MR AVS SNAPSHOT
After Visit Summary   6/21/2017    Sonny Vivar    MRN: [id number]           Patient Information     Date Of Birth          1965        Visit Information        Provider Department      6/21/2017 3:00 PM Genna Balderas MD [facility]        Today's Diagnoses     Bilateral lower extremity edema    -  1    Dependence on supplemental oxygen        Right-sided heart failure (H)        Pulmonary hypertension (H)        Long term (current) use of anticoagulants        Pulmonary embolism and infarction (H)        Gastroesophageal reflux disease, esophagitis presence not specified          Care Instructions    We will let you know your test results by [employer].  Please increase the Bumex from 2mg twice a day to 2mg in the morning and 3mg in the evening for the next 3 days.  Please weigh yourself daily and call the clinic ([phone]) if you gain more than 3 pounds in 1 day or more than 5 pounds in 1 week.   Please return to clinic on 6.26.17 for a recheck.            Follow-ups after your visit        Your next 10 appointments already scheduled     Jul 05, 2017  2:15 PM CDT   [facility]   [facility]    [address] [facility] [address] [phone]              Who to contact     If you have questions or need follow up information about today's clinic visit or your schedule please contact [facility] directly at [phone].  Normal or non-critical lab and imaging results will be communicated to you by [employer], letter or phone within 4 business days after the clinic has received the results. If you do not hear from us within 7 days, please contact the clinic through [employer] or phone. If you have a critical or abnormal lab result, we will notify you by phone as soon as possible.  Submit refill requests through [employer] or call your pharmacy and they will forward the refill request  to us. Please allow 3 business days for your refill to be completed.          Additional Information About Your Visit        [employer] Information     [employer] gives you secure access to your electronic health record. If you see a primary care provider, you can also send messages to your care team and make appointments. If you have questions, please call your primary care clinic.  If you do not have a primary care provider, please call [phone] and they will assist you.        Care EveryWhere ID     This is your Care EveryWhere ID. This could be used by other organizations to access your [place] medical records  [id number]        Your Vitals Were     Pulse Temperature Pulse Oximetry BMI (Body Mass Index)          71 97.1  F (36.2  C) (Oral) 92% 34.92 kg/m2         Blood Pressure from Last 3 Encounters:   06/21/17 95/62   04/19/17 100/66   02/22/17 112/74    Weight from Last 3 Encounters:   06/21/17 178 lb 12.8 oz (81.1 kg)   04/19/17 176 lb 3.2 oz (79.9 kg)   03/24/17 172 lb (78 kg)              We Performed the Following     Basic metabolic panel     BNP-N terminal pro          Today's Medication Changes          These changes are accurate as of: 6/21/17  4:19 PM.  If you have any questions, ask your nurse or doctor.               These medicines have changed or have updated prescriptions.        Dose/Directions    simvastatin 20 MG tablet   Commonly known as:  ZOCOR   This may have changed:  Another medication with the same name was removed. Continue taking this medication, and follow the directions you see here.   Used for:  Hyperlipidemia LDL goal <130   Changed by:  Wes Camara MD        TAKE 1 TABLET(20 MG) BY MOUTH 3 TIMES A WEEK   Quantity:  39 tablet   Refills:  3       warfarin 3 MG tablet   Commonly known as:  COUMADIN   This may have changed:  additional instructions   Used for:  Long term (current) use of anticoagulants, Pulmonary embolism and infarction (H)   Changed by:  Genna Balderas  MD Dyana        Take 1.5 mg on mon, wed, thu, fri, take 3 mg on tue, sat, and sun   Quantity:  150 tablet   Refills:  1            Where to get your medicines      These medications were sent to [id number] - [place], [place] - [address] [facility]  [address], [place] [facility] [address]     Phone:  [phone]     acetaZOLAMIDE 250 MG tablet    omeprazole 40 MG capsule    potassium chloride 20 MEQ Packet    sildenafil 20 MG tablet    warfarin 3 MG tablet                Primary Care Provider Office Phone # Fax #    Genna Balderas [phone]       [facility] [address] [facility] [address]        Equal Access to Services     LEANN CHRISTENSEN : Hadii pablo xiong hadasho Soomaali, waaxda luqadaha, qaybta kaalmada adeegyada, radha swann . So [facility] [phone].    ATENCIÓN: Si habla español, tiene a valentine disposición servicios gratuitos de asistencia lingüística. [facility] [phone].    We comply with applicable federal civil rights laws and [place] laws. We do not discriminate on the basis of race, color, national origin, age, disability sex, sexual orientation or gender identity.            Thank you!     Thank you for choosing [facility]  for your care. Our goal is always to provide you with excellent care. Hearing back from our patients is one way we can continue to improve our services. Please take a few minutes to complete the written survey that you may receive in the mail after your visit with us. Thank you!             Your Updated Medication List - Protect others around you: Learn how to safely use, store and throw away your medicines at www.disposemymeds.org.          This list is accurate as of: 6/21/17  4:19 PM.  Always use your most recent med list.                   Brand Name Dispense Instructions for use Diagnosis    ACE/ARB NOT PRESCRIBED (INTENTIONAL)       Please choose reason not prescribed, below    Acute on chronic systolic heart failure (H)       acetaminophen 325 MG tablet    TYLENOL     Take 325-650 mg by mouth every 6 hours as needed for mild pain        acetaZOLAMIDE 250 MG tablet    DIAMOX    90 tablet    Take 1 tablet (250 mg) by mouth daily    Pulmonary hypertension (H)       allopurinol 300 MG tablet    ZYLOPRIM    7 tablet    Take 1 tablet (300 mg) by mouth daily    Idiopathic gout, unspecified chronicity, unspecified site       amoxicillin 500 MG capsule    AMOXIL    42 capsule    TAKE ONE CAPSULE BY MOUTH THREE TIMES DAILY. 2 WEEKS ON, THEN 2 WEEKS OFF    Acquired bronchiectasis (H), Hypoventilation associated with obesity (H)       BETA BLOCKER NOT PRESCRIBED (INTENTIONAL)      Beta Blocker not prescribed intentionally due to Evidence of fluid overload or volume depletion    Hypertension, benign       bumetanide 2 MG tablet    BUMEX    14 tablet    Take 1 tablet (2 mg) by mouth 2 times daily    Pulmonary hypertension (H)       ipratropium - albuterol 0.5 mg/2.5 mg/3 mL 0.5-2.5 (3) MG/3ML neb solution    DUONEB    360 mL    INHALE 1 VIAL BY NEBULIZATION EVERY 6 HOURS AS NEEDED. USE FOR INCREASED COUGH AND/OR CONGESTION    Acquired bronchiectasis (H), Pulmonary hypertension (H)       JOBST ANTI-EM KNEE LENGTH MED Misc     4 each    1 Device daily. Pressure of 15-20.    Venous stasis       Multi-vitamin Tabs tablet      Take 2 tablets by mouth daily        nebulizer/adult mask Kit     1 kit    1 Units 4 times daily.    On supplemental oxygen therapy, Recurrent pulmonary embolism (H), Pulmonary hypertension (H)       omeprazole 40 MG capsule    priLOSEC    90 capsule    TAKE ONE CAPSULE BY MOUTH DAILY. TAKE 30-60 MINUTES BEFORE A MEAL    Gastroesophageal reflux disease, esophagitis presence not specified       * order for DME     1 Device    Dispense one handheld pulse oximeter for home use.  Check oxygen saturation daily.  Notify primary MD clinic if  oxygen regularly <90% or <88% at any time, and increase supplemental oyxgen.    Recurrent pulmonary embolism (H), Bronchiectasis (H), Pulmonary hypertension (H)       * order for DME     1 Units    Equipment being ordered: Tubing for nebulizer and accessories.    Pulmonary hypertension (H), On supplemental oxygen therapy, Hypoventilation associated with obesity (H), Acquired bronchiectasis (H)       * order for DME      3 L continuous.    Need for prophylactic vaccination with tetanus-diphtheria (TD), Thiazide diuretics causing adverse effect in therapeutic use       * order for DME     2 Units    Equipment being ordered:1 pair (2 units) of knee high compression stockings, 20-30mm Hg, to be worn in the daytime only on both legs, with the toes open.    Lymphedema       * order for DME     1 Device    Equipment being ordered:  Home Oxygen (same home and transportable oxygen tanks and equipment which the patient currently has, as of: February 26, 2017). 3 liters per minute of oxygen at rest and on ambulaton via nasal canula. Continuous. Need for 99 months.  Please fax this order, along with Dr Balderas' last OV note from 2.22.17, to: [facility]: fax: [phone].    Acute on chronic respiratory failure with hypoxia and hypercapnia (H)       potassium chloride 20 MEQ Packet    KLOR-CON    180 tablet    Take 20 mEq by mouth 2 times daily dispense tablets not packets.    Pulmonary hypertension (H)       sildenafil 20 MG tablet    REVATIO/VIAGRA    180 tablet    Take 1 tablet (20 mg) by mouth 3 times daily 2/22/2017 patient takes one tablet 3 times daily    Pulmonary hypertension (H)       simvastatin 20 MG tablet    ZOCOR    39 tablet    TAKE 1 TABLET(20 MG) BY MOUTH 3 TIMES A WEEK    Hyperlipidemia LDL goal <130       warfarin 3 MG tablet    COUMADIN    150 tablet    Take 1.5 mg on mon, wed, thu, fri, take 3 mg on tue, sat, and sun    Long term (current) use of anticoagulants, Pulmonary embolism  and infarction (H)       * Notice:  This list has 5 medication(s) that are the same as other medications prescribed for you. Read the directions carefully, and ask your doctor or other care provider to review them with you.

## 2017-06-21 NOTE — PATIENT INSTRUCTIONS
We will let you know your test results by [employer].  Please increase the Bumex from 2mg twice a day to 2mg in the morning and 3mg in the evening for the next 3 days.  Please weigh yourself daily and call the clinic ([phone]) if you gain more than 3 pounds in 1 day or more than 5 pounds in 1 week.   Please return to clinic on 6.26.17 for a recheck.

## 2017-06-21 NOTE — PROGRESS NOTES
SUBJECTIVE:                                                    Sonny Vivar is a 51 year old male who presents to clinic today for the following health issues:      PMH as noted, including recurrent DVT/PE with severe Pulm hypertension, and chronic A fib on warfarin, on Oxygen via a NC,  3L/min at rest and 5-6 L/min when ambulatory, BEAR now on nightly BiPAP, obesity-hypoventilation syndrome-followed by [facility], developmental delay,     Accompanied by his sister    Heart Failure Follow-up    Symptoms:    Shortness of breath: happens with exertion only - stable    Lower extremity edema: stable     Chest pain: No    Using more pillows than normal: Uses 2, not more than normal.    Cough at night: Yes-  sometimes    Weight:    Checking weight daily: No every other day    Weight change: PVP only: for weight increase greater than 2 lbs in a day or 5 lbs in a week, stop PVP and transfer to triage none    Cardiology visits, ER/UC, or hospital admissions since last visit: None    Medication side effects: none     Patient gets weighed every other day, at home: weight is currently 2 pounds more than when last seen, also 2-3 pounds more on the home scale than his dry weight on that scale.    Reported compliance with fluid and sodium restriction and with medications.    BPs on the border of being low.   No recent significant dizziness, though does get dizzy with abrupt body position changes. No recent falls.  Patient is on BiPAP now, instead of CPAP.  His lower extremity wounds have improved tremendously since the start of Wound Care follow-up. The wound have all but closed up. Currently, his sister is following wound care's recommendations; patient has his compression stockings on today.  The sister is wondering if and when she should see Cardiology next-I advised her with their office.          Amount of exercise or physical activity: not much    Problems taking medications regularly: No    Medication side  effects: none    Diet: low salt      Problem list and histories reviewed & adjusted, as indicated.  Additional history: as documented    Patient Active Problem List   Diagnosis     Mental handicap     Pulmonary hypertension (H)     Acquired bronchiectasis (H)     Hypoventilation associated with obesity (H)     Post-phlebitic syndrome     Hyperlipidemia LDL goal <130     Dry eye syndrome     Punctate keratitis due to dry eyes and O2 use     Posterior subcapsular polar senile cataract     Macular scar     Obesity, morbid (more than 100 lbs over ideal weight or BMI > 40) (H)     Long-term (current) use of anticoagulants, INR goal 2.0-3.0     On supplemental oxygen therapy     Trisomy 21 syndrome     Abnormal CT scan of lung     Recurrent pulmonary embolism (H)     Anisometropia     Encounter for counseling     Primary hypercoagulable state (H)     Hypertension, benign     Pulmonary embolism and infarction (H)     Venous (peripheral) insufficiency     Obstructive sleep apnea     Encounter for counseling     Dependence on supplemental oxygen     Bronchiectasis (H)     Long-term (current) use of anticoagulants [Z79.01]     Idiopathic gout, unspecified chronicity, unspecified site     Gastroesophageal reflux disease, esophagitis presence not specified     Chronic atrial fibrillation (H)     Bilateral lower extremity edema     Deep vein thrombosis (DVT) of other vein of lower extremity (H)     Acute on chronic systolic heart failure (H)     Advance care planning     Health Care Home     BP check     Acute on chronic respiratory failure with hypoxia and hypercapnia (H)     Right-sided heart failure (H)     Venous stasis ulcers, unspecified laterality     Past Surgical History:   Procedure Laterality Date     SURGICAL HISTORY OF -       thoractomy for lung infection       Social History   Substance Use Topics     Smoking status: Never Smoker     Smokeless tobacco: Never Used      Comment: Lives in smoke free household      Alcohol use No     Family History   Problem Relation Age of Onset     HEART DISEASE Mother      Unknown/Adopted No family hx of      CANCER No family hx of      DIABETES No family hx of      Hypertension No family hx of      CEREBROVASCULAR DISEASE No family hx of      Thyroid Disease No family hx of      Glaucoma No family hx of      Macular Degeneration No family hx of          Current Outpatient Prescriptions   Medication Sig Dispense Refill     acetaZOLAMIDE (DIAMOX) 250 MG tablet Take 1 tablet (250 mg) by mouth daily 90 tablet 0     sildenafil (REVATIO/VIAGRA) 20 MG tablet Take 1 tablet (20 mg) by mouth 3 times daily 2/22/2017 patient takes one tablet 3 times daily 180 tablet 1     warfarin (COUMADIN) 3 MG tablet Take 1.5 mg on mon, wed, thu, fri, take 3 mg on tue, sat, and sun 150 tablet 1     potassium chloride (KLOR-CON) 20 MEQ Packet Take 20 mEq by mouth 2 times daily dispense tablets not packets. 180 tablet 0     omeprazole (PRILOSEC) 40 MG capsule TAKE ONE CAPSULE BY MOUTH DAILY. TAKE 30-60 MINUTES BEFORE A MEAL 90 capsule 3     amoxicillin (AMOXIL) 500 MG capsule TAKE ONE CAPSULE BY MOUTH THREE TIMES DAILY. 2 WEEKS ON, THEN 2 WEEKS OFF 42 capsule 1     simvastatin (ZOCOR) 20 MG tablet TAKE 1 TABLET(20 MG) BY MOUTH 3 TIMES A WEEK 39 tablet 3     allopurinol (ZYLOPRIM) 300 MG tablet Take 1 tablet (300 mg) by mouth daily 7 tablet 0     BETA BLOCKER NOT PRESCRIBED, INTENTIONAL, Beta Blocker not prescribed intentionally due to Evidence of fluid overload or volume depletion  0     ACE/ARB NOT PRESCRIBED, INTENTIONAL, Please choose reason not prescribed, below       order for DME Equipment being ordered:   Home Oxygen (same home and transportable oxygen tanks and equipment which the patient currently has, as of: February 26, 2017).  3 liters per minute of oxygen at rest and on ambulaton via nasal canula.  Continuous.  Need for 99 months.    Please fax this order, along with Dr Balderas' last OV note from  2.22.17, to: [facility]: fax: [phone]. 1 Device 0     acetaminophen (TYLENOL) 325 MG tablet Take 325-650 mg by mouth every 6 hours as needed for mild pain       multivitamin, therapeutic with minerals (MULTI-VITAMIN) TABS Take 2 tablets by mouth daily        order for DME Equipment being ordered:1 pair (2 units) of knee high compression stockings, 20-30mm Hg, to be worn in the daytime only on both legs, with the toes open. 2 Units 1     ipratropium - albuterol 0.5 mg/2.5 mg/3 mL (DUONEB) 0.5-2.5 (3) MG/3ML nebulization INHALE 1 VIAL BY NEBULIZATION EVERY 6 HOURS AS NEEDED. USE FOR INCREASED COUGH AND/OR CONGESTION 360 mL 1     Elastic Bandages & Supports (JOBST ANTI-EM KNEE LENGTH MED) MISC 1 Device daily. Pressure of 15-20. 4 each 1     ORDER FOR DME Equipment being ordered: Tubing for nebulizer and accessories. 1 Units 1     ORDER FOR DME Dispense one handheld pulse oximeter for home use.  Check oxygen saturation daily.  Notify primary MD clinic if oxygen regularly <90% or <88% at any time, and increase supplemental oyxgen. 1 Device 0     Respiratory Therapy Supplies (NEBULIZER/ADULT MASK) KIT 1 Units 4 times daily. 1 kit 0     ORDER FOR DME 3 L continuous.       bumetanide (BUMEX) 2 MG tablet TAKE ONE TABLET BY MOUTH TWICE DAILY 180 tablet 1       Reviewed and updated as needed this visit by clinical staff       Reviewed and updated as needed this visit by Provider           ==============================================================  ROS:  Constitutional, HEENT, cardiovascular, pulmonary, GI, , musculoskeletal, neuro, skin, endocrine and psych systems are negative, except as otherwise noted.       OBJECTIVE:                                                    BP 95/62 (BP Location: Right arm, Patient Position: Chair, Cuff Size: Adult Regular)  Pulse 71  Temp 97.1  F (36.2  C) (Oral)  Wt 178 lb 12.8 oz (81.1 kg)  SpO2 92%  BMI 34.92 kg/m2  Body mass index is 34.92 kg/(m^2).      Wt Readings from Last 4 Encounters:   06/21/17 178 lb 12.8 oz (81.1 kg)   04/19/17 176 lb 3.2 oz (79.9 kg)   03/24/17 172 lb (78 kg)   02/22/17 172 lb 3.2 oz (78.1 kg)       GENERAL APPEARANCE: healthy, alert and in no distress; on O2 via NC.  EYES: Eyes grossly normal to inspection, and conjunctivae and sclerae normal  HENT: head normocephalic and atraumatic and mouth without ulcers or lesions, oropharynx clear and oral mucous membranes moist  NECK: no noticeable adenopathy, no asymmetry, masses, or scars    RESP:   lungs with mild crackles in the right upper lung field; leg edema a bit worse than on prior exam in 4/2017  CV: regular rate and rhythm, normal S1 S2, no S3 or S4, no murmur, click or rub, +1-+2 pitting b/l pedal peripheral edema with chronic venous stasis changes.  ABDOMEN: soft, nontender, no hepatosplenomegaly, no masses and bowel sounds normal  MS: no musculoskeletal defects are noted and gait is age appropriate without ataxia  SKIN:   +2-+3 b/l lower extremity edema  Fibrotic scars from prev venous stasis ulcers  o/w, no suspicious lesions or rashes  NEURO: mentation intact and speech normal  PSYCH: mentation appears normal and affect normal/bright.     Results for orders placed or performed in visit on 06/21/17   Basic metabolic panel   Result Value Ref Range    Sodium 136 133 - 144 mmol/L    Potassium 4.0 3.4 - 5.3 mmol/L    Chloride 99 94 - 109 mmol/L    Carbon Dioxide 30 20 - 32 mmol/L    Anion Gap 7 3 - 14 mmol/L    Glucose 82 70 - 99 mg/dL    Urea Nitrogen 26 7 - 30 mg/dL    Creatinine 1.55 (H) 0.66 - 1.25 mg/dL    GFR Estimate 47 (L) >60 mL/min/1.7m2    GFR Estimate If Black 57 (L) >60 mL/min/1.7m2    Calcium 8.5 8.5 - 10.1 mg/dL   BNP-N terminal pro   Result Value Ref Range    N-Terminal Pro Bnp 2233 (H) 0 - 125 pg/mL        ASSESSMENT/PLAN:                                                        ICD-10-CM    1. Bilateral lower extremity edema R60.0    2. Dependence on supplemental  oxygen Z99.81    3. Right-sided heart failure (H) I50.9 Basic metabolic panel     BNP-N terminal pro   4. Pulmonary hypertension (H) I27.2 acetaZOLAMIDE (DIAMOX) 250 MG tablet     sildenafil (REVATIO/VIAGRA) 20 MG tablet     potassium chloride (KLOR-CON) 20 MEQ Packet   5. Long term (current) use of anticoagulants Z79.01 warfarin (COUMADIN) 3 MG tablet   6. Pulmonary embolism and infarction (H) I26.99 warfarin (COUMADIN) 3 MG tablet   7. Gastroesophageal reflux disease, esophagitis presence not specified K21.9 omeprazole (PRILOSEC) 40 MG capsule     Time spent coordinating care was approximately 30 minutes out of a total of approximately 40 minutes (which was the total duration of the appointment) including the diagnosis, prognosis and treatment of the above medical conditions.     ASSESSMENT: CHF, and other chronic medical issues as noted, with e.o mild acute on chronic CHF by hypervolemia on exam (by weight, lung exam and increased lower extremity edema)  PLAN:  As per orders above and patient instructions below.       Patient Instructions   We will let you know your test results by [facility].  Please increase the Bumex from 2mg twice a day to 2mg in the morning and 3mg in the evening for the next 3 days.  Please weigh yourself daily and call the clinic ([phone]) if you gain more than 3 pounds in 1 day or more than 5 pounds in 1 week.   Please return to clinic on 6.26.17 for a recheck.                 Genna Balderas MD  [facility]

## 2017-06-21 NOTE — PROGRESS NOTES
ANTICOAGULATION FOLLOW-UP CLINIC VISIT    Patient Name:  Sonny Vivar  Date:  6/21/2017  Contact Type:  Face to Face    SUBJECTIVE:     Patient Findings     Positives No Problem Findings           OBJECTIVE    INR Protime   Date Value Ref Range Status   06/07/2017 3.7 (A) 0.86 - 1.14 Final       ASSESSMENT / PLAN  INR assessment THER    Recheck INR In: 2 WEEKS    INR Location Clinic    Billed home INR No    Vit K given? None      Anticoagulation Summary as of 6/21/2017     INR goal 2.0-3.0   Today's INR    Maintenance plan 3 mg (3 mg x 1) on Sun, Tue, Sat; 1.5 mg (3 mg x 0.5) all other days   Full instructions 3 mg on Sun, Tue, Sat; 1.5 mg all other days   Weekly total 15 mg   No change documented Ivania Olmos RN   Plan last modified Beverley Contreras RN (6/7/2017)   Next INR check 7/5/2017   Target end date     Indications   Pulmonary embolism and infarction (H) [I26.99]  Long-term (current) use of anticoagulants [Z79.01] [Z79.01]         Anticoagulation Episode Summary     INR check location     Preferred lab     Send INR reminders to [facility]    Comments       Anticoagulation Care Providers     Provider Role Specialty Phone number    Wes Camara MD Referring Internal Medicine [phone]    Genna Balderas MD Responsible Internal Medicine [phone]            See the Encounter Report to view Anticoagulation Flowsheet and Dosing Calendar (Go to Encounters tab in chart review, and find the Anticoagulation Therapy Visit)        Ivania Olmos RN

## 2017-06-21 NOTE — NURSING NOTE
Chief Complaint   Patient presents with     Heart Failure     CHF       Initial BP 95/62 (BP Location: Right arm, Patient Position: Chair, Cuff Size: Adult Regular)  Pulse 71  Temp 97.1  F (36.2  C) (Oral)  Wt 178 lb 12.8 oz (81.1 kg)  SpO2 (!) 86%  BMI 34.92 kg/m2 Estimated body mass index is 34.92 kg/(m^2) as calculated from the following:    Height as of 1/4/17: 5' (1.524 m).    Weight as of this encounter: 178 lb 12.8 oz (81.1 kg).  Medication Reconciliation: complete    Coreen Vergara CMA

## 2017-06-21 NOTE — MR AVS SNAPSHOT
Sonny Vivar   6/21/2017 2:45 PM   Anticoagulation Therapy Visit    Description:  51 year old male   Provider:  [facility]   Department:  An Nurse           INR as of 6/21/2017     Today's INR 2.7.    The selected INR is not numeric and cannot be flagged as in or out of the INR goal range.      Anticoagulation Summary as of 6/21/2017     INR goal 2.0-3.0   Today's INR 2.7.   The selected INR is not numeric and cannot be flagged as in or out of the INR goal range.   Full instructions 3 mg on Sun, Tue, Sat; 1.5 mg all other days   Next INR check 7/5/2017    Indications   Pulmonary embolism and infarction (H) [I26.99]  Long-term (current) use of anticoagulants [Z79.01] [Z79.01]         Your next Anticoagulation Clinic appointment(s)     Jul 05, 2017  2:15 PM CDT   [facility]   [facility]    [address] [address] [phone]              Contact Numbers     [facility]  Please call  [phone] to cancel and/or reschedule your appointment, or with any problems or questions regarding your therapy.        June 2017 Details    Sun Mon Tue Wed Thu Fri Sat         1               2               3                 4               5               6               7               8               9               10                 11               12               13               14               15               16               17                 18               19               20               21      1.5 mg   See details      22      1.5 mg         23      1.5 mg         24      3 mg           25      3 mg         26      1.5 mg         27      3 mg         28      1.5 mg         29      1.5 mg         30      1.5 mg           Date Details   06/21 This INR check               How to take your warfarin dose     To take:  1.5 mg Take 0.5 of a 3 mg tablet.    To take:  3 mg Take 1 of the 3 mg tablets.           July  2017 Details    Sun Mon Tue Wed Thu Fri Sat           1      3 mg           2      3 mg         3      1.5 mg         4      3 mg         5            6               7               8                 9               10               11               12               13               14               15                 16               17               18               19               20               21               22                 23               24               25               26               27               28               29                 30               31                     Date Details   No additional details    Date of next INR:  7/5/2017         How to take your warfarin dose     To take:  1.5 mg Take 0.5 of a 3 mg tablet.    To take:  3 mg Take 1 of the 3 mg tablets.

## 2017-06-22 NOTE — TELEPHONE ENCOUNTER
Chio (guardian) also calling re: Bumex medication.  Sonny is out of this med, please authorize as soon as possible.  Call Chio  when done at [phone].

## 2017-06-22 NOTE — PROGRESS NOTES
Dear Sonny,     Your test results are attached.    Your labwork is stable for you.    Please notify me via [employer] or contact the clinic at [phone] if you have any questions.    Genna Balderas MD

## 2017-06-22 NOTE — TELEPHONE ENCOUNTER
Medication refilled per [facility] RN protocol.   Guardian Sarah called and updated.     No further questions or concerns at this time.  Sonali Sarkar RN   [facility]

## 2017-06-22 NOTE — TELEPHONE ENCOUNTER
[facility] calling to check on status of Bumex 5mg medication order. Patient was in to see  yesterday and all of the other medications that she ordered got over to [facility], but the Bumex did not get there. Please call to advise. Thank you

## 2017-06-22 NOTE — TELEPHONE ENCOUNTER
Medication refilled per [facility] RN protocol.   Sonali Sarkar RN   [facility]

## 2017-06-23 NOTE — PROGRESS NOTES
SUBJECTIVE:                                                    Sonny Vivar is a 51 year old male who presents to clinic today for the following health issues:    PMH as noted, including recurrent DVT/PE with severe Pulm hypertension, and chronic A fib on warfarin, on Oxygen via a NC,  3L/min at rest and 5-6 L/min when ambulatory, BEAR now on nightly BiPAP, obesity-hypoventilation syndrome-followed by [facility], developmental delay,     Accompanied by his sister.    Heart Failure Follow-up    Symptoms:    Shortness of breath: none    Lower extremity edema: worsened from baseline 1/2 inch in right and 1 inch in left per caretaker (Sarah)    Chest pain: No    Using more pillows than normal: No    Cough at night: Yes-  sometimes    Weight:    Checking weight daily: Yes    Weight change: none within the daily variations of 2 pounds    Cardiology visits, ER/UC, or hospital admissions since last visit: None    Medication side effects: none      Amount of exercise or physical activity: daily acitivites    Problems taking medications regularly: No    Medication side effects: none    Diet: low salt       We increased bumex for 3 days at the end of last week (then the patient's appointment for a recheck on Monday 6/26, had to be rescheduled, as I was not here).  His daily weights at home this week, were: 174 lb on Monday and 175 today.   His sister brought weight logs per day: 2-3 weeks ago, the baseline weight was 171-172, this week 174-175.   His Bps are a little lower today, though he and his sister deny recent dizziness or falls or near-falls. These would be highly undesirable, as the patient is on warfarin.   He denies constipation.         Problem list and histories reviewed & adjusted, as indicated.  Additional history: as documented      Patient Active Problem List   Diagnosis     Mental handicap     Pulmonary hypertension (H)     Acquired bronchiectasis (H)     Hypoventilation associated with obesity  (H)     Post-phlebitic syndrome     Hyperlipidemia LDL goal <130     Dry eye syndrome     Punctate keratitis due to dry eyes and O2 use     Posterior subcapsular polar senile cataract     Macular scar     Obesity, morbid (more than 100 lbs over ideal weight or BMI > 40) (H)     Long-term (current) use of anticoagulants, INR goal 2.0-3.0     On supplemental oxygen therapy     Trisomy 21 syndrome     Abnormal CT scan of lung     Recurrent pulmonary embolism (H)     Anisometropia     Encounter for counseling     Primary hypercoagulable state (H)     Hypertension, benign     Pulmonary embolism and infarction (H)     Venous (peripheral) insufficiency     Obstructive sleep apnea     Encounter for counseling     Dependence on supplemental oxygen     Bronchiectasis (H)     Long-term (current) use of anticoagulants [Z79.01]     Idiopathic gout, unspecified chronicity, unspecified site     Gastroesophageal reflux disease, esophagitis presence not specified     Chronic atrial fibrillation (H)     Bilateral lower extremity edema     Deep vein thrombosis (DVT) of other vein of lower extremity (H)     Acute on chronic systolic heart failure (H)     Advance care planning     Health Care Home     BP check     Acute on chronic respiratory failure with hypoxia and hypercapnia (H)     Right-sided heart failure (H)     Venous stasis ulcers, unspecified laterality     Past Surgical History:   Procedure Laterality Date     SURGICAL HISTORY OF -       thoractomy for lung infection       Social History   Substance Use Topics     Smoking status: Never Smoker     Smokeless tobacco: Never Used      Comment: Lives in smoke free household     Alcohol use No     Family History   Problem Relation Age of Onset     HEART DISEASE Mother      Unknown/Adopted No family hx of      CANCER No family hx of      DIABETES No family hx of      Hypertension No family hx of      CEREBROVASCULAR DISEASE No family hx of      Thyroid Disease No family hx of       Glaucoma No family hx of      Macular Degeneration No family hx of          Current Outpatient Prescriptions   Medication Sig Dispense Refill     bumetanide (BUMEX) 2 MG tablet TAKE ONE TABLET BY MOUTH TWICE DAILY 180 tablet 1     acetaZOLAMIDE (DIAMOX) 250 MG tablet Take 1 tablet (250 mg) by mouth daily 90 tablet 0     sildenafil (REVATIO/VIAGRA) 20 MG tablet Take 1 tablet (20 mg) by mouth 3 times daily 2/22/2017 patient takes one tablet 3 times daily 180 tablet 1     warfarin (COUMADIN) 3 MG tablet Take 1.5 mg on mon, wed, thu, fri, take 3 mg on tue, sat, and sun 150 tablet 1     potassium chloride (KLOR-CON) 20 MEQ Packet Take 20 mEq by mouth 2 times daily dispense tablets not packets. 180 tablet 0     omeprazole (PRILOSEC) 40 MG capsule TAKE ONE CAPSULE BY MOUTH DAILY. TAKE 30-60 MINUTES BEFORE A MEAL 90 capsule 3     amoxicillin (AMOXIL) 500 MG capsule TAKE ONE CAPSULE BY MOUTH THREE TIMES DAILY. 2 WEEKS ON, THEN 2 WEEKS OFF 42 capsule 1     simvastatin (ZOCOR) 20 MG tablet TAKE 1 TABLET(20 MG) BY MOUTH 3 TIMES A WEEK 39 tablet 3     allopurinol (ZYLOPRIM) 300 MG tablet Take 1 tablet (300 mg) by mouth daily 7 tablet 0     BETA BLOCKER NOT PRESCRIBED, INTENTIONAL, Beta Blocker not prescribed intentionally due to Evidence of fluid overload or volume depletion  0     ACE/ARB NOT PRESCRIBED, INTENTIONAL, Please choose reason not prescribed, below       order for DME Equipment being ordered:   Home Oxygen (same home and transportable oxygen tanks and equipment which the patient currently has, as of: February 26, 2017).  3 liters per minute of oxygen at rest and on ambulaton via nasal canula.  Continuous.  Need for 99 months.    Please fax this order, along with Dr Balderas' last OV note from 2.22.17, to: [facility]: fax: [phone]. 1 Device 0     acetaminophen (TYLENOL) 325 MG tablet Take 325-650 mg by mouth every 6 hours as needed for mild pain       multivitamin, therapeutic with  minerals (MULTI-VITAMIN) TABS Take 2 tablets by mouth daily        order for DME Equipment being ordered:1 pair (2 units) of knee high compression stockings, 20-30mm Hg, to be worn in the daytime only on both legs, with the toes open. (Patient taking differently: Equipment being ordered:1 pair (2 units) of knee high compression stockings, 15-20mm Hg, to be worn in the daytime only on both legs, with the toes open.) 2 Units 1     ipratropium - albuterol 0.5 mg/2.5 mg/3 mL (DUONEB) 0.5-2.5 (3) MG/3ML nebulization INHALE 1 VIAL BY NEBULIZATION EVERY 6 HOURS AS NEEDED. USE FOR INCREASED COUGH AND/OR CONGESTION 360 mL 1     ORDER FOR DME Equipment being ordered: Tubing for nebulizer and accessories. 1 Units 1     ORDER FOR DME Dispense one handheld pulse oximeter for home use.  Check oxygen saturation daily.  Notify primary MD clinic if oxygen regularly <90% or <88% at any time, and increase supplemental oyxgen. 1 Device 0     Respiratory Therapy Supplies (NEBULIZER/ADULT MASK) KIT 1 Units 4 times daily. 1 kit 0     ORDER FOR DME 3 L continuous.         Reviewed and updated as needed this visit by clinical staff       Reviewed and updated as needed this visit by Provider          Lab Results   Component Value Date    INR 2.7. 06/21/2017    INR 3.7 06/07/2017    INR 1.9 05/24/2017    INR 3.2 05/10/2017    INR 3.3 04/26/2017     INR goal: 2.0-3.0    ==============================================================  ROS:  Constitutional, HEENT, cardiovascular, pulmonary, GI, , musculoskeletal, neuro, skin, endocrine and psych systems are negative, except as otherwise noted.       OBJECTIVE:                                                    BP 91/60  Pulse 78  Temp 96.8  F (36  C) (Oral)  Wt 181 lb (82.1 kg)  SpO2 (!) 88%  BMI 35.35 kg/m2  Body mass index is 35.35 kg/(m^2).     GENERAL APPEARANCE: healthy, alert and in no distress; on O2 via NC.  EYES: Eyes grossly normal to inspection, and conjunctivae and sclerae  normal  HENT: head normocephalic and atraumatic and mouth without ulcers or lesions, oropharynx clear and oral mucous membranes moist  NECK: no noticeable adenopathy, no asymmetry, masses, or scars    RESP:    a bit clearer [than the 6.21.17 exam] especially on the right  CV: regular rate and rhythm, normal S1 S2, no S3 or S4, no murmur, click or rub, +1-+2 pitting b/l pedal peripheral edema with chronic venous stasis changes [same as the 6.21.17 exam]  ABDOMEN: soft, nontender, no hepatosplenomegaly, no masses and bowel sounds normal  MS: no musculoskeletal defects are noted and gait is age appropriate without ataxia  SKIN:   +2-+3 b/l lower extremity edema  [wearing compression stockings]  o/w, no suspicious lesions or rashes  NEURO: mentation intact and speech normal  PSYCH: mentation appears normal and affect normal/bright.         ASSESSMENT/PLAN:                                                        ICD-10-CM    1. Acute on chronic systolic heart failure (H) I50.23    2. Chronic atrial fibrillation (H) I48.2 INR CLINIC REFERRAL   3. Primary hypercoagulable state (H) D68.59 INR CLINIC REFERRAL   4. Long-term (current) use of anticoagulants, INR goal 2.0-3.0 Z79.01 INR CLINIC REFERRAL     (I50.23) Acute on chronic systolic heart failure (H)  (primary encounter diagnosis)  Comment:  CHF, and other chronic medical issues as noted, with e.o mild acute on chronic CHF by hypervolemia on exam (by weight, lung exam and increased lower extremity edema)-improved lung exam, leg edema stable.  Other differential diagnosis: afib could be driving CHF, though no noted tachycardia on recent visits; PE with DVT less likely has INR has been at goal (with on 1.9) in the past month.  Compliant with fluid and sodium restrictions and with medications.   Plan:   As per orders above and patient instructions below.     (I48.2) Chronic atrial fibrillation (H)  (D68.59) Primary hypercoagulable state (H)  Comment: due for the  annual:  Plan: INR CLINIC REFERRAL           Patient Instructions   Continue your usual bumex at 2mg twice a day.  Please weigh yourself daily and take half an extra bumex (1mg)  per day if you gain more than 3 pounds in 1 day or more than 5 pounds in 1 week. Call the clinic if you continue to gain weight despite the extra bumex, or if your develop lower oxygen saturations or if you develop worsening shortness of breath.  Please return to clinic in 1 month.                Genna Balderas MD  [facility]

## 2017-06-26 NOTE — TELEPHONE ENCOUNTER
"Discussed verbal orders with Dr. Colleen Doran, to provider to cosign.    Patient can go back to previous dosing, bumex 2 mg BID and please weigh yourself daily and call the clinic ([phone]) if you gain more than 3 pounds in 1 day or more than 5 pounds in 1 week.     Sarah repeated the plan, and verbalized understanding. Then stated, \"I don't want to have to bring Sonny back in again if he was to be rechecked on the higher, previous dosing.\" writer couldn't guarentee patient wouldn't need a recheck after his 6/30 appointment.   Writer asked if the pedal edema is better, the same or worse. Sarah does not know if his pedal edema has gotten better, she stated, \"I hope she [Dr. Genna Balderas] is not going to ask me that. Im not measuring his legs.\" Sarah said, \"im not sure what I should be watching for.\"   Writer advised Sarah that, going forward, if she is unclear about what Dr. Genna Balderas asks of her, or what her role is for Sonny, she need to ask and to not leave the clinic without understanding the plan. Sarah also went on to say, Dr. Genna Balderas advised for her to watch for symptoms of dizziness. aSrah stated no, he has not been dizzy, but she was not clear why patient would have an increase in dizziness. At this point, writer offered to make patient an appointment today since her language implied that she was uncomfortable with this situation. She declined an appointment.     Advised Sarah that since she requested a return call today, and Dr. Genna Balderas is unexpectedly out of the clinic. Message was reviewed with Dr. Colleen Doran. This message will be apart of patients medical chart. Also, if Dr. Genna Balderas has further advice at this time. Dr. Genna Balderas will see this message and can comment if she feels the plan is inappropriate. She also wanted writers name and the provider who is providing the instruction in Dr. Genna Balderas unexpected absence. Writers name was provided. "     Ally Gutiérrez

## 2017-06-26 NOTE — TELEPHONE ENCOUNTER
Patients sister Sarah @[phone] called back and I rescheduled his appointment for 6/30/17.  Kelsie Alvarez,

## 2017-06-26 NOTE — TELEPHONE ENCOUNTER
Patients sister Sarah @cell number [phone] is calling to discuss patients medication bumetanide (BUMEX) 2 MG tablet. Provider advised to check with patient after taking this med for 3 days.  Pt had an appt today but provider is not in, I rescheduled his appt for 6/30/17.  Please advise.  Thank you.  Kelsie Alvarez,

## 2017-06-26 NOTE — TELEPHONE ENCOUNTER
Per 6/21/17 OV:  Please increase the Bumex from 2mg twice a day to 2mg in the morning and 3mg in the evening for the next 3 days.  Please weigh yourself daily and call the clinic ([phone]) if you gain more than 3 pounds in 1 day or more than 5 pounds in 1 week.   Please return to clinic on 6.26.17 for a recheck.    Provider is out of the clinic today, and patient has been rescheduled for 6/30/17.    Patient has not gained more than 3 lbs in 1 day.  Sarah has been giving  Bumex 2 mg in the morning and 3 mg in the evening since 6/21. She didn't know what dose to give after 3 days. She has continued at current dose since last ov.    To provider to advise on Bumex dosing.  Ally García, BIENVENIDON RN

## 2017-06-26 NOTE — TELEPHONE ENCOUNTER
Patient is scheduled on 6/26/17 with Dr. Balderas.  This appointment needs to be rescheduled as the provider is going to be unavailable.  Patient was contacted by: Phone. Left message for patient's sister Sarah to call and schedule appointment

## 2017-06-30 NOTE — NURSING NOTE
Chief Complaint   Patient presents with     RECHECK     recheck from 6/21/2017 for follow increase Bumex        Initial BP (!) 85/65  Pulse 78  Temp 96.8  F (36  C) (Oral)  Wt 181 lb (82.1 kg)  SpO2 (!) 88%  BMI 35.35 kg/m2 Estimated body mass index is 35.35 kg/(m^2) as calculated from the following:    Height as of 1/4/17: 5' (1.524 m).    Weight as of this encounter: 181 lb (82.1 kg).  Medication Reconciliation: complete   Kathrin Durham M.A.

## 2017-06-30 NOTE — MR AVS SNAPSHOT
After Visit Summary   6/30/2017    Sonny Vivar    MRN: [id number]           Patient Information     Date Of Birth          1965        Visit Information        Provider Department      6/30/2017 2:10 PM Genna Balderas MD [facility]        Care Instructions    Continue your usual bumex at 2mg twice a day.  Please weigh yourself daily and take half an extra bumex (1mg)  per day if you gain more than 3 pounds in 1 day or more than 5 pounds in 1 week. Call the clinic if you continue to gain weight despite the extra bumex, or if your develop lower oxygen saturations or if you develop worsening shortness of breath.  Please return to clinic in 1 month.           Follow-ups after your visit        Your next 10 appointments already scheduled     Jul 05, 2017  2:15 PM CDT   Anticoagulation Visit with AN ANTI COAG   [facility]    [address] [address] [phone]              Who to contact     If you have questions or need follow up information about today's clinic visit or your schedule please contact [facility] directly at [phone].  Normal or non-critical lab and imaging results will be communicated to you by MyChart, letter or phone within 4 business days after the clinic has received the results. If you do not hear from us within 7 days, please contact the clinic through MyChart or phone. If you have a critical or abnormal lab result, we will notify you by phone as soon as possible.  Submit refill requests through [employer] or call your pharmacy and they will forward the refill request to us. Please allow 3 business days for your refill to be completed.          Additional Information About Your Visit        MyChart Information     [employer] gives you secure access to your electronic health record. If you see a primary care provider, you can also send messages to your care team and make  appointments. If you have questions, please call your primary care clinic.  If you do not have a primary care provider, please call [phone] and they will assist you.        Care EveryWhere ID     This is your Care EveryWhere ID. This could be used by other organizations to access your [place] medical records  [id number]        Your Vitals Were     Pulse Temperature Pulse Oximetry BMI (Body Mass Index)          78 96.8  F (36  C) (Oral) 88% 35.35 kg/m2         Blood Pressure from Last 3 Encounters:   06/30/17 91/60   06/21/17 95/62   04/19/17 100/66    Weight from Last 3 Encounters:   06/30/17 181 lb (82.1 kg)   06/21/17 178 lb 12.8 oz (81.1 kg)   04/19/17 176 lb 3.2 oz (79.9 kg)              Today, you had the following     No orders found for display         Today's Medication Changes          These changes are accurate as of: 6/30/17  3:22 PM.  If you have any questions, ask your nurse or doctor.               These medicines have changed or have updated prescriptions.        Dose/Directions    * order for DME   This may have changed:  additional instructions   Used for:  Lymphedema   Changed by:  Genna Balderas MD        Equipment being ordered:1 pair (2 units) of knee high compression stockings, 20-30mm Hg, to be worn in the daytime only on both legs, with the toes open.   Quantity:  2 Units   Refills:  1       * order for DME   This may have changed:  Another medication with the same name was changed. Make sure you understand how and when to take each.   Used for:  Acute on chronic respiratory failure with hypoxia and hypercapnia (H)   Changed by:  Genna Balderas MD        Equipment being ordered:  Home Oxygen (same home and transportable oxygen tanks and equipment which the patient currently has, as of: February 26, 2017). 3 liters per minute of oxygen at rest and on ambulaton via nasal canula. Continuous. Need for 99 months.  Please fax this order, along with Dr Balderas'  last OV note from 2.22.17, to: [facility]: fax: [phone].   Quantity:  1 Device   Refills:  0       * Notice:  This list has 2 medication(s) that are the same as other medications prescribed for you. Read the directions carefully, and ask your doctor or other care provider to review them with you.      Stop taking these medicines if you haven't already. Please contact your care team if you have questions.     JOBST ANTI-EM KNEE LENGTH MED Misc   Stopped by:  Genna Balderas MD                    Primary Care Provider Office Phone # Fax #    Genna Balderas [phone]       [facility] [id number] [place] [address]        Equal Access to Services     LEANN CHRISTENSEN : Hadii aad ku hadasho Soomaali, waaxda luqadaha, qaybta kaalmada adeegyada, radha swann . So [facility] [phone].    ATENCIÓN: Si habla español, tiene a valentine disposición servicios gratuitos de asistencia lingüística. Llame al [phone].    We comply with applicable federal civil rights laws and [place] laws. We do not discriminate on the basis of race, color, national origin, age, disability sex, sexual orientation or gender identity.            Thank you!     Thank you for choosing [facility]  for your care. Our goal is always to provide you with excellent care. Hearing back from our patients is one way we can continue to improve our services. Please take a few minutes to complete the written survey that you may receive in the mail after your visit with us. Thank you!             Your Updated Medication List - Protect others around you: Learn how to safely use, store and throw away your medicines at www.disposemymeds.org.          This list is accurate as of: 6/30/17  3:22 PM.  Always use your most recent med list.                   Brand Name Dispense Instructions for use Diagnosis    ACE/ARB NOT PRESCRIBED  (INTENTIONAL)      Please choose reason not prescribed, below    Acute on chronic systolic heart failure (H)       acetaminophen 325 MG tablet    TYLENOL     Take 325-650 mg by mouth every 6 hours as needed for mild pain        acetaZOLAMIDE 250 MG tablet    DIAMOX    90 tablet    Take 1 tablet (250 mg) by mouth daily    Pulmonary hypertension (H)       allopurinol 300 MG tablet    ZYLOPRIM    7 tablet    Take 1 tablet (300 mg) by mouth daily    Idiopathic gout, unspecified chronicity, unspecified site       amoxicillin 500 MG capsule    AMOXIL    42 capsule    TAKE ONE CAPSULE BY MOUTH THREE TIMES DAILY. 2 WEEKS ON, THEN 2 WEEKS OFF    Acquired bronchiectasis (H), Hypoventilation associated with obesity (H)       BETA BLOCKER NOT PRESCRIBED (INTENTIONAL)      Beta Blocker not prescribed intentionally due to Evidence of fluid overload or volume depletion    Hypertension, benign       bumetanide 2 MG tablet    BUMEX    180 tablet    TAKE ONE TABLET BY MOUTH TWICE DAILY    Pulmonary hypertension (H)       ipratropium - albuterol 0.5 mg/2.5 mg/3 mL 0.5-2.5 (3) MG/3ML neb solution    DUONEB    360 mL    INHALE 1 VIAL BY NEBULIZATION EVERY 6 HOURS AS NEEDED. USE FOR INCREASED COUGH AND/OR CONGESTION    Acquired bronchiectasis (H), Pulmonary hypertension (H)       Multi-vitamin Tabs tablet      Take 2 tablets by mouth daily        nebulizer/adult mask Kit     1 kit    1 Units 4 times daily.    On supplemental oxygen therapy, Recurrent pulmonary embolism (H), Pulmonary hypertension (H)       omeprazole 40 MG capsule    priLOSEC    90 capsule    TAKE ONE CAPSULE BY MOUTH DAILY. TAKE 30-60 MINUTES BEFORE A MEAL    Gastroesophageal reflux disease, esophagitis presence not specified       * order for DME     1 Device    Dispense one handheld pulse oximeter for home use.  Check oxygen saturation daily.  Notify primary MD clinic if oxygen regularly <90% or <88% at any time, and increase supplemental oyxgen.    Recurrent  pulmonary embolism (H), Bronchiectasis (H), Pulmonary hypertension (H)       * order for DME     1 Units    Equipment being ordered: Tubing for nebulizer and accessories.    Pulmonary hypertension (H), On supplemental oxygen therapy, Hypoventilation associated with obesity (H), Acquired bronchiectasis (H)       * order for DME      3 L continuous.    Need for prophylactic vaccination with tetanus-diphtheria (TD), Thiazide diuretics causing adverse effect in therapeutic use       * order for DME     2 Units    Equipment being ordered:1 pair (2 units) of knee high compression stockings, 20-30mm Hg, to be worn in the daytime only on both legs, with the toes open.    Lymphedema       * order for DME     1 Device    Equipment being ordered:  Home Oxygen (same home and transportable oxygen tanks and equipment which the patient currently has, as of: February 26, 2017). 3 liters per minute of oxygen at rest and on ambulaton via nasal canula. Continuous. Need for 99 months.  Please fax this order, along with Dr Balderas' last OV note from 2.22.17, to: [facility]: fax: [phone].    Acute on chronic respiratory failure with hypoxia and hypercapnia (H)       potassium chloride 20 MEQ Packet    KLOR-CON    180 tablet    Take 20 mEq by mouth 2 times daily dispense tablets not packets.    Pulmonary hypertension (H)       sildenafil 20 MG tablet    REVATIO/VIAGRA    180 tablet    Take 1 tablet (20 mg) by mouth 3 times daily 2/22/2017 patient takes one tablet 3 times daily    Pulmonary hypertension (H)       simvastatin 20 MG tablet    ZOCOR    39 tablet    TAKE 1 TABLET(20 MG) BY MOUTH 3 TIMES A WEEK    Hyperlipidemia LDL goal <130       warfarin 3 MG tablet    COUMADIN    150 tablet    Take 1.5 mg on mon, wed, thu, fri, take 3 mg on tue, sat, and sun    Long term (current) use of anticoagulants, Pulmonary embolism and infarction (H)       * Notice:  This list has 5 medication(s) that are the same as  other medications prescribed for you. Read the directions carefully, and ask your doctor or other care provider to review them with you.

## 2017-06-30 NOTE — PATIENT INSTRUCTIONS
Continue your usual bumex at 2mg twice a day.  Please weigh yourself daily and take half an extra bumex (1mg)  per day if you gain more than 3 pounds in 1 day or more than 5 pounds in 1 week. Call the clinic if you continue to gain weight despite the extra bumex, or if your develop lower oxygen saturations or if you develop worsening shortness of breath.  Please return to clinic in 1 month.

## 2017-07-05 NOTE — PROGRESS NOTES
ANTICOAGULATION FOLLOW-UP CLINIC VISIT    Patient Name:  Sonny Vivar  Date:  7/5/2017  Contact Type:  Face to Face  With sister present.  SUBJECTIVE:     Patient Findings     Positives No Problem Findings           OBJECTIVE    INR Protime   Date Value Ref Range Status   07/05/2017 2.9 (A) 0.86 - 1.14 Final       ASSESSMENT / PLAN  INR assessment THER    Recheck INR In: 2 WEEKS    INR Location Clinic      Anticoagulation Summary as of 7/5/2017     INR goal 2.0-3.0   Today's INR 2.9   Maintenance plan 3 mg (3 mg x 1) on Sun, Tue, Sat; 1.5 mg (3 mg x 0.5) all other days   Full instructions 3 mg on Sun, Tue, Sat; 1.5 mg all other days   Weekly total 15 mg   No change documented Beverley Contreras RN   Plan last modified Beverley Contreras RN (6/7/2017)   Next INR check 7/19/2017   Target end date     Indications   Pulmonary embolism and infarction (H) [I26.99]  Long-term (current) use of anticoagulants [Z79.01] [Z79.01]         Anticoagulation Episode Summary     INR check location     Preferred lab     Send INR reminders to AN ANTICOAG CLINIC    Comments       Anticoagulation Care Providers     Provider Role Specialty Phone number    Wes Camara MD Referring Internal Medicine [phone]    Genna Balderas MD Responsible Internal Medicine [phone]            See the Encounter Report to view Anticoagulation Flowsheet and Dosing Calendar (Go to Encounters tab in chart review, and find the Anticoagulation Therapy Visit)        Beverley Contreras RN

## 2017-07-05 NOTE — MR AVS SNAPSHOT
Sonny Vivar   7/5/2017 2:15 PM   Anticoagulation Therapy Visit    Description:  51 year old male   Provider:  AN ANTI COAG   Department:  An Nurse           INR as of 7/5/2017     Today's INR 2.9      Anticoagulation Summary as of 7/5/2017     INR goal 2.0-3.0   Today's INR 2.9   Full instructions 3 mg on Sun, Tue, Sat; 1.5 mg all other days   Next INR check 7/19/2017    Indications   Pulmonary embolism and infarction (H) [I26.99]  Long-term (current) use of anticoagulants [Z79.01] [Z79.01]         Your next Anticoagulation Clinic appointment(s)     Jul 19, 2017  2:15 PM CDT   [facility]ticoagulation Visit with AN ANTI COAG   [facility]    [address] [facility] [address] [facility] [address] [phone]            Aug 02, 2017  2:00 PM CDT   Anticoagulation Visit with AN ANTI COAG   [facility]    [address] [facility] [address] [phone]              Contact Numbers     [facility]  Please call  [phone] to cancel and/or reschedule your appointment, or with any problems or questions regarding your therapy.        July 2017 Details    Sun Mon Tue Wed Thu Fri Sat           1                 2               3               4               5      1.5 mg   See details      6      1.5 mg         7      1.5 mg         8      3 mg           9      3 mg         10      1.5 mg         11      3 mg         12      1.5 mg         13      1.5 mg         14      1.5 mg         15      3 mg           16      3 mg         17      1.5 mg         18      3 mg         19            20               21               22                 23               24               25               26               27               28               29                 30               31                     Date Details   07/05 This INR check       Date of next INR:  7/19/2017         How to take your warfarin dose     To take:  1.5 mg Take 0.5 of a 3  mg tablet.    To take:  3 mg Take 1 of the 3 mg tablets.

## 2017-07-19 NOTE — PROGRESS NOTES
ANTICOAGULATION FOLLOW-UP CLINIC VISIT    Patient Name:  Sonny Vivar  Date:  7/19/2017  Contact Type:  Face to Face    SUBJECTIVE:     Patient Findings     Positives No Problem Findings           OBJECTIVE    INR Protime   Date Value Ref Range Status   07/19/2017 3.0 (A) 0.86 - 1.14 Final       ASSESSMENT / PLAN  INR assessment THER    Recheck INR In: 2 WEEKS    INR Location Clinic      Anticoagulation Summary as of 7/19/2017     INR goal 2.0-3.0   Today's INR 3.0   Maintenance plan 3 mg (3 mg x 1) on Sun, Tue, Thu; 1.5 mg (3 mg x 0.5) all other days   Full instructions 3 mg on Sun, Tue, Thu; 1.5 mg all other days   Weekly total 15 mg   Plan last modified Beverley Contreras RN (7/19/2017)   Next INR check 8/2/2017   Target end date     Indications   Pulmonary embolism and infarction (H) [I26.99]  Long-term (current) use of anticoagulants [Z79.01] [Z79.01]         Anticoagulation Episode Summary     INR check location     Preferred lab     Send INR reminders to AN ANTICOAG CLINIC    Comments       Anticoagulation Care Providers     Provider Role Specialty Phone number    Wes Camara MD Referring Internal Medicine [phone]    Genna Balderas MD Responsible Internal Medicine [phone]            See the Encounter Report to view Anticoagulation Flowsheet and Dosing Calendar (Go to Encounters tab in chart review, and find the Anticoagulation Therapy Visit)        Beverley Contreras RN

## 2017-07-19 NOTE — MR AVS SNAPSHOT
Sonny Vivar   7/19/2017 2:15 PM   Anticoagulation Therapy Visit    Description:  51 year old male   Provider:  AN ANTI COAG   Department:  An Nurse           INR as of 7/19/2017     Today's INR 3.0      Anticoagulation Summary as of 7/19/2017     INR goal 2.0-3.0   Today's INR 3.0   Full instructions 3 mg on Sun, Tue, Thu; 1.5 mg all other days   Next INR check 8/2/2017    Indications   Pulmonary embolism and infarction (H) [I26.99]  Long-term (current) use of anticoagulants [Z79.01] [Z79.01]         Your next Anticoagulation Clinic appointment(s)     Aug 02, 2017  2:00 PM CDT   [facility]agulation [facility] with AN ANTI COAG   [facility]    [address] [facility] [address] [phone]              Contact Numbers     [facility]  Please call  [phone] to cancel and/or reschedule your appointment, or with any problems or questions regarding your therapy.        July 2017 Details    Sun Mon Tue Wed Thu Fri Sat           1                 2               3               4               5               6               7               8                 9               10               11               12               13               14               15                 16               17               18               19      1.5 mg   See details      20      3 mg         21      1.5 mg         22      1.5 mg           23      3 mg         24      1.5 mg         25      3 mg         26      1.5 mg         27      3 mg         28      1.5 mg         29      1.5 mg           30      3 mg         31      1.5 mg               Date Details   07/19 This INR check               How to take your warfarin dose     To take:  1.5 mg Take 0.5 of a 3 mg tablet.    To take:  3 mg Take 1 of the 3 mg tablets.           August 2017 Details    Sun Mon Tue Wed Thu Fri Sat       1      3 mg         2            3               4               5                 6                7               8               9               10               11               12                 13               14               15               16               17               18               19                 20               21               22               23               24               25               26                 27               28               29               30               31                  Date Details   No additional details    Date of next INR:  8/2/2017         How to take your warfarin dose     To take:  1.5 mg Take 0.5 of a 3 mg tablet.    To take:  3 mg Take 1 of the 3 mg tablets.

## 2017-07-19 NOTE — TELEPHONE ENCOUNTER
Patient is scheduled on 8/2/2017 with Dr. Balderas.  This appointment needs to be rescheduled as the provider is going to be unavailable.  Patient was contacted by: Phone. Left message for patients sister Windy to call and schedule appointment.  I offered a 12:20pm appointment on the same day.  Kelsie Alvarez,

## 2017-07-20 NOTE — TELEPHONE ENCOUNTER
I left a second vm for Sarah @[phone] regarding the patients appointment.  My direct line given [phone].  Kelsie Alvarez,

## 2017-07-21 NOTE — TELEPHONE ENCOUNTER
I spoke to patients sister Chio and she says the new appointment time @12:20pm on 8/2/17 works just fine.  Kelsie Alvarez,

## 2017-08-02 NOTE — NURSING NOTE
Chief Complaint   Patient presents with     RECHECK       Initial BP 97/59  Pulse 87  Temp 96.7  F (35.9  C) (Oral)  Wt 195 lb (88.5 kg)  SpO2 95%  BMI 38.08 kg/m2 Estimated body mass index is 38.08 kg/(m^2) as calculated from the following:    Height as of 1/4/17: 5' (1.524 m).    Weight as of this encounter: 195 lb (88.5 kg).  Medication Reconciliation: complete

## 2017-08-02 NOTE — PROGRESS NOTES
ANTICOAGULATION FOLLOW-UP CLINIC VISIT    Patient Name:  Sonny Vivar  Date:  8/2/2017  Contact Type:  Face to Face    SUBJECTIVE:     Patient Findings     Positives Unexplained INR or factor level change    Comments No changes. Here with sister. INR 3.5. Seeing provider today. Hold dose today. Recheck in 2 wks.            OBJECTIVE    INR Protime   Date Value Ref Range Status   08/02/2017 3.5 (A) 0.86 - 1.14 Final       ASSESSMENT / PLAN  INR assessment SUPRA    Recheck INR In: 2 WEEKS    INR Location Clinic      Anticoagulation Summary as of 8/2/2017     INR goal 2.0-3.0   Today's INR 3.5!   Maintenance plan 3 mg (3 mg x 1) on Sun, Tue, Thu; 1.5 mg (3 mg x 0.5) all other days   Full instructions 8/2: Hold; Otherwise 3 mg on Sun, Tue, Thu; 1.5 mg all other days   Weekly total 15 mg   Plan last modified Beverley Contreras RN (7/19/2017)   Next INR check 8/16/2017   Target end date     Indications   Pulmonary embolism and infarction (H) [I26.99]  Long-term (current) use of anticoagulants [Z79.01] [Z79.01]         Anticoagulation Episode Summary     INR check location     Preferred lab     Send INR reminders to AN ANTICOAG CLINIC    Comments       Anticoagulation Care Providers     Provider Role Specialty Phone number    Wes Camara MD Referring Internal Medicine [phone]    Genna Balderas MD Responsible Internal Medicine [phone]            See the Encounter Report to view Anticoagulation Flowsheet and Dosing Calendar (Go to Encounters tab in chart review, and find the Anticoagulation Therapy Visit)        Beverley Contreras RN

## 2017-08-02 NOTE — PROGRESS NOTES
SUBJECTIVE:                                                    Sonny Vivar is a 51 year old male who presents to clinic today for the following health issues:       PMH as noted, including recurrent DVT/PE with severe Pulm hypertension, and chronic A fib on warfarin, on Oxygen via a NC, 3L/min at rest and 5-6 L/min when ambulatory, BEAR now on nightly BiPAP, obesity-hypoventilation syndrome-followed by [facility], developmental delay,      Accompanied by his sister.    CHF:   1 month follow up on chronic conditions        Amount of exercise or physical activity: unknown    Problems taking medications regularly: No    Medication side effects: none    Diet: low salt      His sister has been measuring his abdominal circumference and it has increased.  Also a 15 pound increase since the last visit about 2 months ago.  Legs have not been more swollen, in fact, are less swollen than previously.  She is wondering if the CHF could be worsening.  She has tried giving prn extra bumex over the weekend (ie, 3mg in AM and 2mg in PM, instead of 2mg bid), and his weight did decrease by 2 pounds and then came back up when he returned to his usual bumex dose. Apparently, no change in his baseline shortness of breath or other noted changes in symptoms when he was on the higher bumex dose.   He does stop more often to rest (when walking) - a few times on the steps, though his O2 sats (the family has an oxymeter at home) has not shown decreased O2 sat numbers.   He has also been eating better lately (and this may also be part of the reason for his recent weight gain).   No CP, no chest pressure.  No fever.   No increased cough, above his baseline.             Problem list and histories reviewed & adjusted, as indicated.  Additional history: as documented    Patient Active Problem List   Diagnosis     Mental handicap     Pulmonary hypertension (H)     Acquired bronchiectasis (H)     Hypoventilation associated with obesity (H)      Post-phlebitic syndrome     Hyperlipidemia LDL goal <130     Dry eye syndrome     Punctate keratitis due to dry eyes and O2 use     Posterior subcapsular polar senile cataract     Macular scar     Obesity, morbid (more than 100 lbs over ideal weight or BMI > 40) (H)     Long-term (current) use of anticoagulants, INR goal 2.0-3.0     On supplemental oxygen therapy     Trisomy 21 syndrome     Abnormal CT scan of lung     Recurrent pulmonary embolism (H)     Anisometropia     Encounter for counseling     Primary hypercoagulable state (H)     Hypertension, benign     Pulmonary embolism and infarction (H)     Venous (peripheral) insufficiency     Obstructive sleep apnea     Encounter for counseling     Dependence on supplemental oxygen     Bronchiectasis (H)     Long-term (current) use of anticoagulants [Z79.01]     Idiopathic gout, unspecified chronicity, unspecified site     Gastroesophageal reflux disease, esophagitis presence not specified     Chronic atrial fibrillation (H)     Bilateral lower extremity edema     Deep vein thrombosis (DVT) of other vein of lower extremity (H)     Acute on chronic systolic heart failure (H)     Advance care planning     Health Care Home     BP check     Acute on chronic respiratory failure with hypoxia and hypercapnia (H)     Right-sided heart failure (H)     Venous stasis ulcers, unspecified laterality     Past Surgical History:   Procedure Laterality Date     SURGICAL HISTORY OF -       thoractomy for lung infection       Social History   Substance Use Topics     Smoking status: Never Smoker     Smokeless tobacco: Never Used      Comment: Lives in smoke free household     Alcohol use No     Family History   Problem Relation Age of Onset     HEART DISEASE Mother      Unknown/Adopted No family hx of      CANCER No family hx of      DIABETES No family hx of      Hypertension No family hx of      CEREBROVASCULAR DISEASE No family hx of      Thyroid Disease No family hx of       Glaucoma No family hx of      Macular Degeneration No family hx of          Current Outpatient Prescriptions   Medication Sig Dispense Refill     warfarin (COUMADIN) 3 MG tablet Take 3 mg valentine,tu,th and 1.5 mg m,w,f,sa 150 tablet 1     order for DME Equipment being ordered:1 pair (2 units) of knee high compression stockings, 10-15mm Hg, to be worn in the daytime only on both legs, closed-toe. 2 Units 0     bumetanide (BUMEX) 2 MG tablet Take 1 tablet (2 mg) by mouth 2 times daily Take 1.5 tablets (3 mg) by mouth 2 times daily 180 tablet 1     acetaZOLAMIDE (DIAMOX) 250 MG tablet Take 1 tablet (250 mg) by mouth daily 90 tablet 0     sildenafil (REVATIO/VIAGRA) 20 MG tablet Take 1 tablet (20 mg) by mouth 3 times daily 2/22/2017 patient takes one tablet 3 times daily 180 tablet 1     potassium chloride (KLOR-CON) 20 MEQ Packet Take 20 mEq by mouth 2 times daily dispense tablets not packets. 180 tablet 0     omeprazole (PRILOSEC) 40 MG capsule TAKE ONE CAPSULE BY MOUTH DAILY. TAKE 30-60 MINUTES BEFORE A MEAL 90 capsule 3     amoxicillin (AMOXIL) 500 MG capsule TAKE ONE CAPSULE BY MOUTH THREE TIMES DAILY. 2 WEEKS ON, THEN 2 WEEKS OFF 42 capsule 1     simvastatin (ZOCOR) 20 MG tablet TAKE 1 TABLET(20 MG) BY MOUTH 3 TIMES A WEEK 39 tablet 3     allopurinol (ZYLOPRIM) 300 MG tablet Take 1 tablet (300 mg) by mouth daily 7 tablet 0     BETA BLOCKER NOT PRESCRIBED, INTENTIONAL, Beta Blocker not prescribed intentionally due to Evidence of fluid overload or volume depletion  0     ACE/ARB NOT PRESCRIBED, INTENTIONAL, Please choose reason not prescribed, below       order for DME Equipment being ordered:   Home Oxygen (same home and transportable oxygen tanks and equipment which the patient currently has, as of: February 26, 2017).  3 liters per minute of oxygen at rest and on ambulaton via nasal canula.  Continuous.  Need for 99 months.    Please fax this order, along with Dr Balderas' last OV note from 2.22.17, to: [facility]: fax: [phone]. 1 Device 0     acetaminophen (TYLENOL) 325 MG tablet Take 325-650 mg by mouth every 6 hours as needed for mild pain       multivitamin, therapeutic with minerals (MULTI-VITAMIN) TABS Take 2 tablets by mouth daily        ipratropium - albuterol 0.5 mg/2.5 mg/3 mL (DUONEB) 0.5-2.5 (3) MG/3ML nebulization INHALE 1 VIAL BY NEBULIZATION EVERY 6 HOURS AS NEEDED. USE FOR INCREASED COUGH AND/OR CONGESTION 360 mL 1     ORDER FOR DME Equipment being ordered: Tubing for nebulizer and accessories. 1 Units 1     ORDER FOR DME Dispense one handheld pulse oximeter for home use.  Check oxygen saturation daily.  Notify primary MD clinic if oxygen regularly <90% or <88% at any time, and increase supplemental oyxgen. 1 Device 0     Respiratory Therapy Supplies (NEBULIZER/ADULT MASK) KIT 1 Units 4 times daily. 1 kit 0     ORDER FOR DME 3 L continuous.           Reviewed and updated as needed this visit by clinical staff     Reviewed and updated as needed this visit by Provider           ==============================================================  ROS:  Constitutional, HEENT, cardiovascular, pulmonary, GI, , musculoskeletal, neuro, skin, endocrine and psych systems are negative, except as otherwise noted.     .All others negative.     OBJECTIVE:                                                    BP 97/59  Pulse 87  Temp 96.7  F (35.9  C) (Oral)  Wt 195 lb (88.5 kg)  SpO2 95%  BMI 38.08 kg/m2  Body mass index is 38.08 kg/(m^2).          GENERAL APPEARANCE: healthy, alert and in no distress; on O2 via NC.  EYES: Eyes grossly normal to inspection, and conjunctivae and sclerae normal  HENT: head normocephalic and atraumatic and mouth without ulcers or lesions, oropharynx clear and oral mucous membranes moist  NECK: no noticeable adenopathy, no asymmetry, masses, or scars    RESP:    a bit clearer [than the 6.21.17 exam] especially on the right  CV: regular rate and rhythm, normal S1 S2, no  S3 or S4, no murmur, click or rub, +1-+2 pitting b/l pedal peripheral edema with chronic venous stasis changes [same as the 6.21.17 exam]  ABDOMEN: soft, nontender, no hepatosplenomegaly, no masses and bowel sounds normal  MS: no musculoskeletal defects are noted and gait is age appropriate without ataxia  SKIN:   +2 b/l lower extremity edema  [wearing compression stockings]  o/w, no suspicious lesions or rashes  NEURO: mentation intact and speech normal  PSYCH: mentation appears normal and affect normal/bright.       Results for orders placed or performed in visit on 08/02/17   Basic metabolic panel   Result Value Ref Range    Sodium 137 133 - 144 mmol/L    Potassium 4.1 3.4 - 5.3 mmol/L    Chloride 99 94 - 109 mmol/L    Carbon Dioxide 30 20 - 32 mmol/L    Anion Gap 8 3 - 14 mmol/L    Glucose 78 70 - 99 mg/dL    Urea Nitrogen 32 (H) 7 - 30 mg/dL    Creatinine 1.53 (H) 0.66 - 1.25 mg/dL    GFR Estimate 48 (L) >60 mL/min/1.7m2    GFR Estimate If Black 58 (L) >60 mL/min/1.7m2    Calcium 8.0 (L) 8.5 - 10.1 mg/dL   BNP-N terminal pro   Result Value Ref Range    N-Terminal Pro Bnp 3061 (H) 0 - 125 pg/mL   ProBNP of 3000 vs 2200 a month prior.  Creatinine 1.53-1.55, stable when compared with a month prior, but higher than compared with more previous values (? cardiorenal syndrome),.    XR CHEST 2 VW   8/2/2017 5:38 PM      HISTORY: Other abnormalities of breathing     COMPARISON: 3/25/2014         IMPRESSION: Patchy infiltrate in the right lower lobe and to a lesser  extent the left lower lobe. Question small left pleural effusion  versus pleural thickening.   Per chest CT report at [facility]: the locations may be similar to the ones now noted on the CHEST X-RAY, which were at that time suspected to be d/t CHF.     ASSESSMENT/PLAN:                                                        ICD-10-CM    1. Right-sided heart failure (H) I50.9 Basic metabolic panel     BNP-N terminal pro   2. Lymphedema I89.0 order for DME      DISCONTINUED: order for DME   3. Decreased lung sounds R06.89 XR Chest 2 Views   4. Pulmonary hypertension (H) I27.2 bumetanide (BUMEX) 2 MG tablet   5. Deep vein thrombosis (DVT) of other vein of lower extremity (H) I82.499      (I50.9) Right-sided heart failure (H)  (primary encounter diagnosis)  Comment: hypervolemic per history, exam, CHEST X-RAY and labs (proBNP)   Plan:        As per orders above and patient instructions below.     (I89.0) Lymphedema  Comment: stable: needs a new rx:  Plan: order for DME    (R06.89) Decreased lung sounds  Comment: CHEST X-RAY most c/w CHF  Plan:  As per orders above and patient instructions below.     (I27.2) Pulmonary hypertension (H)  Comment: as noted, CHF  Plan: bumetanide (BUMEX) 2 MG tablet            Deep vein thrombosis (DVT) of other vein of lower extremity  ASSESSMENT: history; on warfarin.  PLAN:  Continue current regimen.       Patient Instructions   [facility]  Continue your usual bumex at 3mg (1.5 tablets)  twice a day and return to clinic in a week.     Please weigh yourself daily and call the clinic ([phone]) if you gain more than 3 pounds in 1 day or more than 5 pounds in 1 week.    Regarding your concern that you may be retaining CO2: you can try to adjust the oxygen to be (on your pulse oxymeter at home) at about 90%, and see if your breathing feels better or worse on this. If it feels worse, then return to the previous settings.                   Genna Balderas MD  [facility]

## 2017-08-02 NOTE — MR AVS SNAPSHOT
After Visit Summary   8/2/2017    Sonny Vivar    MRN: [id number]           Patient Information     Date Of Birth          1965        Visit Information        Provider Department      8/2/2017 4:40 PM Genna Balderas MD [facility]        Today's Diagnoses     Right-sided heart failure (H)    -  1    Lymphedema        Decreased lung sounds        Pulmonary hypertension (H)          Care Instructions    We will let you know your test results by MyChart.  Continue your usual bumex at 3mg (1.5 tablets)  twice a day and return to clinic in a week.     Please weigh yourself daily and call the clinic ([phone]) if you gain more than 3 pounds in 1 day or more than 5 pounds in 1 week.    Regarding your concern that you may be retaining CO2: you can try to adjust the oxygen to be (on your pulse oxymeter at home) at about 90%, and see if your breathing feels better or worse on this. If it feels worse, then return to the previous settings.             Follow-ups after your visit        Your next 10 appointments already scheduled     Aug 16, 2017  2:15 PM CDT   Anticoagulation Visit with AN ANTI COAG   [facility] ([facility]    [address] [address] [phone]              Future tests that were ordered for you today     Open Future Orders        Priority Expected Expires Ordered    XR Chest 2 Views Routine 8/2/2017 8/2/2018 8/2/2017            Who to contact     If you have questions or need follow up information about today's clinic visit or your schedule please contact [facility] directly at [phone].  Normal or non-critical lab and imaging results will be communicated to you by MyChart, letter or phone within 4 business days after the clinic has received the results. If you do not hear from us within 7 days, please contact the clinic through MyChart or phone. If you have a critical or abnormal lab  result, we will notify you by phone as soon as possible.  Submit refill requests through [employer] or call your pharmacy and they will forward the refill request to us. Please allow 3 business days for your refill to be completed.          Additional Information About Your Visit        [email or web address] Information     [employer] gives you secure access to your electronic health record. If you see a primary care provider, you can also send messages to your care team and make appointments. If you have questions, please call your primary care clinic.  If you do not have a primary care provider, please call [phone] and they will assist you.        Care EveryWhere ID     This is your Care EveryWhere ID. This could be used by other organizations to access your [facility] medical records  [id number]        Your Vitals Were     Pulse Temperature Pulse Oximetry BMI (Body Mass Index)          87 96.7  F (35.9  C) (Oral) 95% 38.08 kg/m2         Blood Pressure from Last 3 Encounters:   08/02/17 97/59   08/02/17 97/59   06/30/17 91/60    Weight from Last 3 Encounters:   08/02/17 195 lb (88.5 kg)   08/02/17 195 lb (88.5 kg)   06/30/17 181 lb (82.1 kg)              We Performed the Following     Basic metabolic panel     BNP-N terminal pro          Today's Medication Changes          These changes are accurate as of: 8/2/17  5:18 PM.  If you have any questions, ask your nurse or doctor.               These medicines have changed or have updated prescriptions.        Dose/Directions    bumetanide 2 MG tablet   Commonly known as:  BUMEX   This may have changed:  See the new instructions.   Used for:  Pulmonary hypertension (H)   Changed by:  Genna Balderas MD        Dose:  2 mg   Take 1 tablet (2 mg) by mouth 2 times daily Take 1.5 tablets (3 mg) by mouth 2 times daily   Quantity:  180 tablet   Refills:  1       * order for DME   This may have changed:  Another medication with the same name was changed. Make sure you understand  how and when to take each.   Used for:  Acute on chronic respiratory failure with hypoxia and hypercapnia (H)   Changed by:  Genna Balderas MD        Equipment being ordered:  Home Oxygen (same home and transportable oxygen tanks and equipment which the patient currently has, as of: February 26, 2017). 3 liters per minute of oxygen at rest and on ambulaton via nasal canula. Continuous. Need for 99 months.  Please fax this order, along with Dr Balderas' last OV note from 2.22.17, to: [facility]: fax: [phone].   Quantity:  1 Device   Refills:  0       * order for DME   This may have changed:  additional instructions   Used for:  Lymphedema   Changed by:  Genna Balderas MD        Equipment being ordered:1 pair (2 units) of knee high compression stockings, 10-15mm Hg, to be worn in the daytime only on both legs, closed-toe.   Quantity:  2 Units   Refills:  0       warfarin 3 MG tablet   Commonly known as:  COUMADIN   This may have changed:  additional instructions   Used for:  Long term (current) use of anticoagulants, Pulmonary embolism and infarction (H)        Take 3 mg valentine,tu,th and 1.5 mg m,w,f,sa   Quantity:  150 tablet   Refills:  1       * Notice:  This list has 2 medication(s) that are the same as other medications prescribed for you. Read the directions carefully, and ask your doctor or other care provider to review them with you.         Where to get your medicines      Some of these will need a paper prescription and others can be bought over the counter.  Ask your nurse if you have questions.     Bring a paper prescription for each of these medications     order for DME       You don't need a prescription for these medications     bumetanide 2 MG tablet                Primary Care Provider Office Phone # Fax #    Genna Balderas [phone]       [facility] [id number] [place] [id number]        Equal  Access to Services     [facility]: Hadii palbo xiong melissa Bassett, wahaylieda luqadaha, qaybta kaqasimradha barrientos. So [facility] [phone].    ATENCIÓN: Si habla espaxel, tiene a valentine disposición servicios gratuitos de asistencia lingüística. Llame al [phone].    We comply with applicable federal civil rights laws and [place] laws. We do not discriminate on the basis of race, color, national origin, age, disability sex, sexual orientation or gender identity.            Thank you!     Thank you for choosing [facility]  [facility]. Our goal is always to provide you with excellent care. Hearing back from our patients is one way we can continue to improve our services. Please take a few minutes to complete the written survey that you may receive in the mail after your visit with us. Thank you!             Your Updated Medication List - Protect others around you: Learn how to safely use, store and throw away your medicines at www.disposemymeds.org.          This list is accurate as of: 8/2/17  5:18 PM.  Always use your most recent med list.                   Brand Name Dispense Instructions for use Diagnosis    ACE/ARB NOT PRESCRIBED (INTENTIONAL)      Please choose reason not prescribed, below    Acute on chronic systolic heart failure (H)       acetaminophen 325 MG tablet    TYLENOL     Take 325-650 mg by mouth every 6 hours as needed for mild pain        acetaZOLAMIDE 250 MG tablet    DIAMOX    90 tablet    Take 1 tablet (250 mg) by mouth daily    Pulmonary hypertension (H)       allopurinol 300 MG tablet    ZYLOPRIM    7 tablet    Take 1 tablet (300 mg) by mouth daily    Idiopathic gout, unspecified chronicity, unspecified site       amoxicillin 500 MG capsule    AMOXIL    42 capsule    TAKE ONE CAPSULE BY MOUTH THREE TIMES DAILY. 2 WEEKS ON, THEN 2 WEEKS OFF    Acquired bronchiectasis (H), Hypoventilation associated with obesity (H)       BETA BLOCKER NOT  PRESCRIBED (INTENTIONAL)      Beta Blocker not prescribed intentionally due to Evidence of fluid overload or volume depletion    Hypertension, benign       bumetanide 2 MG tablet    BUMEX    180 tablet    Take 1 tablet (2 mg) by mouth 2 times daily Take 1.5 tablets (3 mg) by mouth 2 times daily    Pulmonary hypertension (H)       ipratropium - albuterol 0.5 mg/2.5 mg/3 mL 0.5-2.5 (3) MG/3ML neb solution    DUONEB    360 mL    INHALE 1 VIAL BY NEBULIZATION EVERY 6 HOURS AS NEEDED. USE FOR INCREASED COUGH AND/OR CONGESTION    Acquired bronchiectasis (H), Pulmonary hypertension (H)       Multi-vitamin Tabs tablet      Take 2 tablets by mouth daily        nebulizer/adult mask Kit     1 kit    1 Units 4 times daily.    On supplemental oxygen therapy, Recurrent pulmonary embolism (H), Pulmonary hypertension (H)       omeprazole 40 MG capsule    priLOSEC    90 capsule    TAKE ONE CAPSULE BY MOUTH DAILY. TAKE 30-60 MINUTES BEFORE A MEAL    Gastroesophageal reflux disease, esophagitis presence not specified       * order for DME     1 Device    Dispense one handheld pulse oximeter for home use.  Check oxygen saturation daily.  Notify primary MD clinic if oxygen regularly <90% or <88% at any time, and increase supplemental oyxgen.    Recurrent pulmonary embolism (H), Bronchiectasis (H), Pulmonary hypertension (H)       * order for DME     1 Units    Equipment being ordered: Tubing for nebulizer and accessories.    Pulmonary hypertension (H), On supplemental oxygen therapy, Hypoventilation associated with obesity (H), Acquired bronchiectasis (H)       * order for DME      3 L continuous.    Need for prophylactic vaccination with tetanus-diphtheria (TD), Thiazide diuretics causing adverse effect in therapeutic use       * order for DME     1 Device    Equipment being ordered:  Home Oxygen (same home and transportable oxygen tanks and equipment which the patient currently has, as of: February 26, 2017). 3 liters per minute of  oxygen at rest and on ambulaton via nasal canula. Continuous. Need for 99 months.  Please fax this order, along with Dr Balderas' last OV note from 2.22.17, to: [facility]: fax: [phone].    Acute on chronic respiratory failure with hypoxia and hypercapnia (H)       * order for DME     2 Units    Equipment being ordered:1 pair (2 units) of knee high compression stockings, 10-15mm Hg, to be worn in the daytime only on both legs, closed-toe.    Lymphedema       potassium chloride 20 MEQ Packet    KLOR-CON    180 tablet    Take 20 mEq by mouth 2 times daily dispense tablets not packets.    Pulmonary hypertension (H)       sildenafil 20 MG tablet    REVATIO/VIAGRA    180 tablet    Take 1 tablet (20 mg) by mouth 3 times daily 2/22/2017 patient takes one tablet 3 times daily    Pulmonary hypertension (H)       simvastatin 20 MG tablet    ZOCOR    39 tablet    TAKE 1 TABLET(20 MG) BY MOUTH 3 TIMES A WEEK    Hyperlipidemia LDL goal <130       warfarin 3 MG tablet    COUMADIN    150 tablet    Take 3 mg su,tu,th and 1.5 mg m,w,f,sa    Long term (current) use of anticoagulants, Pulmonary embolism and infarction (H)       * Notice:  This list has 5 medication(s) that are the same as other medications prescribed for you. Read the directions carefully, and ask your doctor or other care provider to review them with you.

## 2017-08-02 NOTE — MR AVS SNAPSHOT
Sonny Vivar   8/2/2017 2:00 PM   Anticoagulation Therapy Visit    Description:  51 year old male   Provider:  AN ANTI COAG   Department:  An Nurse           INR as of 8/2/2017     Today's INR 3.5!      Anticoagulation Summary as of 8/2/2017     INR goal 2.0-3.0   Today's INR 3.5!   Full instructions 8/2: Hold; Otherwise 3 mg on Sun, Tue, Thu; 1.5 mg all other days   Next INR check 8/16/2017    Indications   Pulmonary embolism and infarction (H) [I26.99]  Long-term (current) use of anticoagulants [Z79.01] [Z79.01]         Your next Anticoagulation Clinic appointment(s)     Aug 02, 2017  2:00 PM CDT   [facility]    [address] [facility] [address] [facility] [address] [phone]            Aug 16, 2017  2:15 PM CDT   [facility]agulation [facility] with AN ANTI COAG   [facility]    [address] [facility] [address] [facility] [address] [phone]              Contact Numbers     [facility]  Please call  [phone] to cancel and/or reschedule your appointment, or with any problems or questions regarding your therapy.        August 2017 Details    Sun Mon Tue Wed Thu Fri Sat       1               2      Hold   See details      3      3 mg         4      1.5 mg         5      1.5 mg           6      3 mg         7      1.5 mg         8      3 mg         9      1.5 mg         10      3 mg         11      1.5 mg         12      1.5 mg           13      3 mg         14      1.5 mg         15      3 mg         16            17               18               19                 20               21               22               23               24               25               26                 27               28               29               30               31                  Date Details   08/02 This INR check       Date of next INR:  8/16/2017         How to take your warfarin dose     To take:  1.5 mg  Take 0.5 of a 3 mg tablet.    To take:  3 mg Take 1 of the 3 mg tablets.    Hold Do not take your warfarin dose. See the Details table to the right for additional instructions.

## 2017-08-02 NOTE — PATIENT INSTRUCTIONS
[facility]  Continue your usual bumex at 3mg (1.5 tablets)  twice a day and return to clinic in a week.     Please weigh yourself daily and call the clinic ([phone]) if you gain more than 3 pounds in 1 day or more than 5 pounds in 1 week.    Regarding your concern that you may be retaining CO2: you can try to adjust the oxygen to be (on your pulse oxymeter at home) at about 90%, and see if your breathing feels better or worse on this. If it feels worse, then return to the previous settings.

## 2017-08-04 NOTE — PROGRESS NOTES
Dear Sonny,     Your test results are attached.    Your blood tests and chest X-ray are consistent with worsening heart failure.  Please continue the treatment plan that we discussed at your last clinic visit and let me know if you have any questions via [employer] or by calling [phone].      Genna Balderas MD

## 2017-08-09 NOTE — PROGRESS NOTES
SUBJECTIVE:                                                    Sonny Vivar is a 51 year old male who presents to clinic today for the following health issues:    Accompanied by his sister.     Heart Failure Follow-up    Symptoms:    Shortness of breath: same as a week ago    Lower extremity edema: stable     Chest pain: No    Using more pillows than normal: No    Cough at night: Yes-  Has been coughing more this week than usual, appears to have caught a respiratory bug last week.     Weight:    Checking weight daily: Yes, now but not over the weekend-see below    Weight change: weight increase of 4-5 lb in 7 days    Cardiology visits, ER/UC, or hospital admissions since last visit: None    Medication side effects: none    Patient was in a respite home over this past weekend, as his sister went on vacation.    His weight at home today at home and yesterday was both 197 lb but that's up 4 pounds from last Wednesday.   It is unclear if the at the respite home, if the sodium and liquid restrictions were being in reinforced.    He is congested; no cough. No f/c. He did receive his nebs twice yesterday and per his sister he sounded better after.     They did not yet try to decrease the O2 sat (see Patient instructions of the last visit on 8.2.17).  Per the sister, he has been taking the higher dose of Bumex since last week of 3mg bid.       Problem list and histories reviewed & adjusted, as indicated.  Additional history: as documented    Patient Active Problem List   Diagnosis     Mental handicap     Pulmonary hypertension (H)     Acquired bronchiectasis (H)     Hypoventilation associated with obesity (H)     Post-phlebitic syndrome     Hyperlipidemia LDL goal <130     Dry eye syndrome     Punctate keratitis due to dry eyes and O2 use     Posterior subcapsular polar senile cataract     Macular scar     Obesity, morbid (more than 100 lbs over ideal weight or BMI > 40) (H)     Long-term (current) use of  anticoagulants, INR goal 2.0-3.0     On supplemental oxygen therapy     Trisomy 21 syndrome     Abnormal CT scan of lung     Recurrent pulmonary embolism (H)     Anisometropia     Encounter for counseling     Primary hypercoagulable state (H)     Hypertension, benign     Pulmonary embolism and infarction (H)     Venous (peripheral) insufficiency     Obstructive sleep apnea     Encounter for counseling     Dependence on supplemental oxygen     Bronchiectasis (H)     Long-term (current) use of anticoagulants [Z79.01]     Idiopathic gout, unspecified chronicity, unspecified site     Gastroesophageal reflux disease, esophagitis presence not specified     Chronic atrial fibrillation (H)     Bilateral lower extremity edema     Deep vein thrombosis (DVT) of other vein of lower extremity (H)     Acute on chronic systolic heart failure (H)     Advance care planning     Health Care Home     BP check     Acute on chronic respiratory failure with hypoxia and hypercapnia (H)     Right-sided heart failure (H)     Venous stasis ulcers, unspecified laterality     Past Surgical History:   Procedure Laterality Date     SURGICAL HISTORY OF -       thoractomy for lung infection     THORACIC SURGERY  Feb 1995    Done at [facility]       Social History   Substance Use Topics     Smoking status: Never Smoker     Smokeless tobacco: Never Used      Comment: Lives in smoke free household     Alcohol use No     Family History   Problem Relation Age of Onset     HEART DISEASE Mother      Unknown/Adopted No family hx of      CANCER No family hx of      DIABETES No family hx of      Hypertension No family hx of      CEREBROVASCULAR DISEASE No family hx of      Thyroid Disease No family hx of      Glaucoma No family hx of      Macular Degeneration No family hx of          Current Outpatient Prescriptions   Medication Sig Dispense Refill     order for DME Tubing for nebulizer 1 Units 0     predniSONE (DELTASONE) 20 MG tablet Take 2 tablets (40  mg) by mouth daily for 5 days 10 tablet 0     levofloxacin (LEVAQUIN) 750 MG tablet Take 1 tablet (750 mg) by mouth daily 5 tablet 0     warfarin (COUMADIN) 3 MG tablet Take 3 mg valentine,tu,th and 1.5 mg m,w,f,sa 150 tablet 1     order for DME Equipment being ordered:1 pair (2 units) of knee high compression stockings, 10-15mm Hg, to be worn in the daytime only on both legs, closed-toe. 2 Units 0     bumetanide (BUMEX) 2 MG tablet Take 1 tablet (2 mg) by mouth 2 times daily Take 1.5 tablets (3 mg) by mouth 2 times daily (Patient taking differently: Take 2 mg by mouth 2 times daily AS of 8/9/2017 patient takes 1.5 tablets 2 times a day.    Take 1.5 tablets (3 mg) by mouth 2 times daily) 180 tablet 1     acetaZOLAMIDE (DIAMOX) 250 MG tablet Take 1 tablet (250 mg) by mouth daily 90 tablet 0     sildenafil (REVATIO/VIAGRA) 20 MG tablet Take 1 tablet (20 mg) by mouth 3 times daily 2/22/2017 patient takes one tablet 3 times daily 180 tablet 1     potassium chloride (KLOR-CON) 20 MEQ Packet Take 20 mEq by mouth 2 times daily dispense tablets not packets. 180 tablet 0     omeprazole (PRILOSEC) 40 MG capsule TAKE ONE CAPSULE BY MOUTH DAILY. TAKE 30-60 MINUTES BEFORE A MEAL 90 capsule 3     amoxicillin (AMOXIL) 500 MG capsule TAKE ONE CAPSULE BY MOUTH THREE TIMES DAILY. 2 WEEKS ON, THEN 2 WEEKS OFF 42 capsule 1     simvastatin (ZOCOR) 20 MG tablet TAKE 1 TABLET(20 MG) BY MOUTH 3 TIMES A WEEK 39 tablet 3     allopurinol (ZYLOPRIM) 300 MG tablet Take 1 tablet (300 mg) by mouth daily 7 tablet 0     BETA BLOCKER NOT PRESCRIBED, INTENTIONAL, Beta Blocker not prescribed intentionally due to Evidence of fluid overload or volume depletion  0     ACE/ARB NOT PRESCRIBED, INTENTIONAL, Please choose reason not prescribed, below       order for DME Equipment being ordered:   Home Oxygen (same home and transportable oxygen tanks and equipment which the patient currently has, as of: February 26, 2017).  3 liters per minute of oxygen at rest and  on ambulaton via nasal canula.  Continuous.  Need for 99 months.    Please fax this order, along with Dr Balderas' last OV note from 2.22.17, to: [facility]: fax: [phone]. 1 Device 0     acetaminophen (TYLENOL) 325 MG tablet Take 325-650 mg by mouth every 6 hours as needed for mild pain       multivitamin, therapeutic with minerals (MULTI-VITAMIN) TABS Take 2 tablets by mouth daily        ipratropium - albuterol 0.5 mg/2.5 mg/3 mL (DUONEB) 0.5-2.5 (3) MG/3ML nebulization INHALE 1 VIAL BY NEBULIZATION EVERY 6 HOURS AS NEEDED. USE FOR INCREASED COUGH AND/OR CONGESTION 360 mL 1     ORDER FOR DME Equipment being ordered: Tubing for nebulizer and accessories. 1 Units 1     ORDER FOR DME Dispense one handheld pulse oximeter for home use.  Check oxygen saturation daily.  Notify primary MD clinic if oxygen regularly <90% or <88% at any time, and increase supplemental oyxgen. 1 Device 0     Respiratory Therapy Supplies (NEBULIZER/ADULT MASK) KIT 1 Units 4 times daily. 1 kit 0     ORDER FOR DME 3 L continuous.           Reviewed and updated as needed this visit by clinical staffTobacco  Allergies  Meds  Med Hx  Surg Hx  Fam Hx  Soc Hx      Reviewed and updated as needed this visit by Provider         ==============================================================  ROS:  Constitutional, HEENT, cardiovascular, pulmonary, GI, , musculoskeletal, neuro, skin, endocrine and psych systems are negative, except as otherwise noted.       OBJECTIVE:                                                    BP 91/62 (BP Location: Right arm, Patient Position: Chair, Cuff Size: Adult Regular)  Pulse (P) 71  Temp (P) 96  F (35.6  C) (Tympanic)  Wt 200 lb 12.8 oz (91.1 kg)  SpO2 (P) 94%  BMI 39.22 kg/m2  Body mass index is 39.22 kg/(m^2).          GENERAL APPEARANCE: healthy, alert and in no distress; on O2 via NC.  EYES: Eyes grossly normal to inspection, and conjunctivae and sclerae normal  HENT: head  normocephalic and atraumatic and mouth without ulcers or lesions, oropharynx:throat erythema,and oral mucous membranes moist  NECK: no noticeable adenopathy, no asymmetry, masses, or scars    RESP:   wheezing in all lung fields [no wheezing on the prior exam on 8.2.17]  CV: regular rate and rhythm, normal S1 S2, no S3 or S4, no murmur, click or rub, +1-+2 pitting b/l pedal peripheral edema with chronic venous stasis changes [same as the 6.21.17 exam]  ABDOMEN: soft, nontender, no hepatosplenomegaly, no masses and bowel sounds normal  MS: no musculoskeletal defects are noted and gait is age appropriate without ataxia  SKIN:   +2 b/l lower extremity edema  [wearing compression stockings]  o/w, no suspicious lesions or rashes  NEURO: mentation intact and speech normal  PSYCH: mentation appears normal and affect normal/bright.       Last Basic Metabolic Panel:  Lab Results   Component Value Date     08/02/2017      Lab Results   Component Value Date    POTASSIUM 4.1 08/02/2017     Lab Results   Component Value Date    CHLORIDE 99 08/02/2017     Lab Results   Component Value Date    FABY 8.0 08/02/2017     Lab Results   Component Value Date    CO2 30 08/02/2017     Lab Results   Component Value Date    BUN 32 08/02/2017     Lab Results   Component Value Date    CR 1.53 08/02/2017     Lab Results   Component Value Date    GLC 78 08/02/2017     N-Terminal Pro Bnp    BNP-N terminal pro           Collected: 08/02/17 1728     Resulting lab: [facility]     Reference range: 0 - 125 pg/mL     Value: 3061 (High)         Results for orders placed or performed in visit on 08/02/17   XR Chest 2 Views    Narrative    XR CHEST 2 VW   8/2/2017 5:38 PM     HISTORY: Other abnormalities of breathing    COMPARISON: 3/25/2014      Impression    IMPRESSION: Patchy infiltrate in the right lower lobe and to a lesser  extent the left lower lobe. Question small left pleural effusion  versus pleural  debi.    LEONEL SEVILLA MD         ASSESSMENT/PLAN:                                                        ICD-10-CM    1. Bronchitis J40 predniSONE (DELTASONE) 20 MG tablet     levofloxacin (LEVAQUIN) 750 MG tablet   2. On supplemental oxygen therapy Z99.81    3. Recurrent pulmonary embolism (H) I26.99    4. Pulmonary hypertension (H) I27.2    5. Hypoventilation associated with obesity (H) E66.2    6. Acquired bronchiectasis (H) J47.9 order for DME     (J40) Bronchitis  (primary encounter diagnosis)  Comment: acute bronchitis, given bronchiectasis and multiple comorbidities, will cover with abx and prednisone:  Plan: predniSONE (DELTASONE) 20 MG tablet,         levofloxacin (LEVAQUIN) 750 MG tablet        As per orders above and patient instructions below.     (Z99.81) On supplemental oxygen therapy  Comment:   Now with lower O2 sats; most c/w a combination of acute bronchitis plus worsening HF in the context of a recent change in his home regimen (as patient was in a respite care home for a few days)  Plan:   As per orders above and patient instructions below.     (I26.99) Recurrent pulmonary embolism (H)  (I27.2) Pulmonary hypertension (H)  (E66.2) Hypoventilation associated with obesity (H)  Comment: currently no suspicion for recurrence or worsening of these chronic issues  Plan:   As per orders above and patient instructions below.   Continue to monitor      (J47.9) Acquired bronchiectasis (H)  Comment:   Plan: order for DME---gave prescription for new nebulizer tubing today               Patient Instructions   Please take the levofloxacin and prednisone for 5 days.  Return this Friday for an INR check.  return to clinic in a week for follow-up with me (Dr Balderas).  Continue to use the nebulizer up to 4-6 hours as needed for wheezing and shortness of breath.  Continue the bumex at 3mg twice a day.   Please let us know if you need a new prescription for the nebulizer tubing.    Do not try to decrease  the oxygen for now.                        Genna Balderas MD  [facility]

## 2017-08-09 NOTE — TELEPHONE ENCOUNTER
Per fax from [facility], patient was prescribed Levofloxacin today and he is currently taking Warfarin. The pharmacist wants to know if it is OK to fill the Levofloxacin?      [phone]    Indiana Villalobos MA

## 2017-08-09 NOTE — PATIENT INSTRUCTIONS
Please take the levofloxacin and prednisone for 5 days.  Return this Friday for an INR check.  return to clinic in a week for follow-up with me (Dr Balderas).  Continue to use the nebulizer up to 4-6 hours as needed for wheezing and shortness of breath.  Continue the bumex at 3mg twice a day.   Please let us know if you need a new prescription for the nebulizer tubing.    Do not try to decrease the oxygen for now.

## 2017-08-09 NOTE — NURSING NOTE
Chief Complaint   Patient presents with     RECHECK     rechecking from last weeks appointment. increased water weight       Initial BP 91/62 (BP Location: Right arm, Patient Position: Chair, Cuff Size: Adult Regular)  Pulse 83  Temp 94.8  F (34.9  C) (Oral)  Wt 200 lb 12.8 oz (91.1 kg)  SpO2 (!) 88%  BMI 39.22 kg/m2 Estimated body mass index is 39.22 kg/(m^2) as calculated from the following:    Height as of 1/4/17: 5' (1.524 m).    Weight as of this encounter: 200 lb 12.8 oz (91.1 kg).  Medication Reconciliation: complete    Coreen Vergara CMA

## 2017-08-11 NOTE — MR AVS SNAPSHOT
Sonny Vivar   8/11/2017 8:30 AM   Anticoagulation Therapy Visit    Description:  51 year old male   Provider:  AN ANTI COAG   Department:  An Nurse           INR as of 8/11/2017     Today's INR 7.0!      Anticoagulation Summary as of 8/11/2017     INR goal 2.0-3.0   Today's INR 7.0!   Full instructions 8/11: Hold; 8/12: Hold; 8/13: Hold; Otherwise 3 mg on Sun, Tue, Thu; 1.5 mg all other days   Next INR check 8/14/2017    Indications   Pulmonary embolism and infarction (H) [I26.99]  Long-term (current) use of anticoagulants [Z79.01] [Z79.01]         Your next Anticoagulation Clinic appointment(s)     Aug 14, 2017  2:15 PM CDT   Anticoagulation Visit with AN ANTI COAG   [facility]    [address] [address] [phone]            Aug 16, 2017  2:15 PM CDT   Anticoagulation Visit with AN ANTI COAG   [facility]    [address] [id number] [phone]              Contact Numbers     [facility]  Please call  [phone] to cancel and/or reschedule your appointment, or with any problems or questions regarding your therapy.        August 2017 Details    Sun Mon Tue Wed Thu Fri Sat       1               2               3               4               5                 6               7               8               9               10               11      Hold   See details      12      Hold           13      Hold         14            15               16               17               18               19                 20               21               22               23               24               25               26                 27               28               29               30               31                  Date Details   08/11 This INR check       Date of next INR:  8/14/2017         How to take your warfarin dose     To take:  1.5 mg Take 0.5 of a 3 mg tablet.     Hold Do not take your warfarin dose. See the Details table to the right for additional instructions.

## 2017-08-11 NOTE — TELEPHONE ENCOUNTER
Chio returned call at 3:26 pm and left a voicemail.      Spoke with Chio, informed her of providers note as written below, she verbalized good understanding.  She stated the pharmacy did call her about the new antibiotic dosing. However this med cannot be processed by insurance.   Writer called pharmacy, spoke with tu johnston when through insurance.     Called Chio back, she will  the antibiotic today and start.     ENDY Vincent RN

## 2017-08-11 NOTE — TELEPHONE ENCOUNTER
New orders sent today by provider of change in dose of Levaquin. Discuss at INR nurse visit. I called patient sister Sarah and left message on voicemail of change and also sent [employer] message.  Beverley Contreras RN

## 2017-08-11 NOTE — TELEPHONE ENCOUNTER
Left message for Chio to call back on their VM. Writers direct line given, Maria Luisa REEDER [phone]. Ally García RN

## 2017-08-11 NOTE — Clinical Note
INR 7.0 discussed with provider in clinic and plan hold coumadin 3 days and recheck in clinic Monday.  To provider to cosign.

## 2017-08-11 NOTE — TELEPHONE ENCOUNTER
Sister Sarah received instructions on change in levaquin dose today. At time of apt today she had thought Sonny had taken the levaquin 750 mg tablet this am but when checked at home noted she had it in his medications for this evening. So he has had 750 mg dose on wed and thurs . Asking is he should start the new dose of 250 mg today or tomorrow. Beverley Contreras RN

## 2017-08-14 NOTE — MR AVS SNAPSHOT
Sonny Vivar   8/14/2017 2:15 PM   Anticoagulation Therapy Visit    Description:  51 year old male   Provider:  AN ANTI COAG   Department:  An Nurse           INR as of 8/14/2017     Today's INR 2.7      Anticoagulation Summary as of 8/14/2017     INR goal 2.0-3.0   Today's INR 2.7   Full instructions 8/15: 1.5 mg; Otherwise 3 mg on Sun, Tue, Thu; 1.5 mg all other days   Next INR check 8/16/2017    Indications   Pulmonary embolism and infarction (H) [I26.99]  Long-term (current) use of anticoagulants [Z79.01] [Z79.01]         Your next Anticoagulation Clinic appointment(s)     Aug 16, 2017  2:45 PM CDT   Anticoagulation Visit with AN ANTI COAG   [facility]    [address] [facility] [address] [phone]              Contact Numbers     [facility]  Please call  [phone] to cancel and/or reschedule your appointment, or with any problems or questions regarding your therapy.        August 2017 Details    Sun Mon Tue Wed Thu Fri Sat       1               2               3               4               5                 6               7               8               9               10               11               12                 13               14      1.5 mg   See details      15      1.5 mg         16            17               18               19                 20               21               22               23               24               25               26                 27               28               29               30               31                  Date Details   08/14 This INR check       Date of next INR:  8/16/2017         How to take your warfarin dose     To take:  1.5 mg Take 0.5 of a 3 mg tablet.

## 2017-08-14 NOTE — PROGRESS NOTES
ANTICOAGULATION FOLLOW-UP CLINIC VISIT    Patient Name:  Sonny Vivar  Date:  8/14/2017  Contact Type:  Face to Face    SUBJECTIVE:     Patient Findings     Comments INR 2.7 and therapeutic after dose held 3 days. On levaquin 250 mg daily. Recheck in 2 days.            OBJECTIVE    INR Protime   Date Value Ref Range Status   08/14/2017 2.7 (A) 0.86 - 1.14 Final       ASSESSMENT / PLAN  INR assessment THER    Recheck INR In: 2 DAYS    INR Location Clinic      Anticoagulation Summary as of 8/14/2017     INR goal 2.0-3.0   Today's INR 2.7   Maintenance plan 3 mg (3 mg x 1) on Sun, Tue, Thu; 1.5 mg (3 mg x 0.5) all other days   Full instructions 3 mg on Sun, Tue, Thu; 1.5 mg all other days   Weekly total 15 mg   Plan last modified Beverley Contreras RN (7/19/2017)   Next INR check 8/16/2017   Target end date     Indications   Pulmonary embolism and infarction (H) [I26.99]  Long-term (current) use of anticoagulants [Z79.01] [Z79.01]         Anticoagulation Episode Summary     INR check location     Preferred lab     Send INR reminders to AN ANTICOAG CLINIC    Comments       Anticoagulation Care Providers     Provider Role Specialty Phone number    Wes Camara MD Referring Internal Medicine [phone]    Genna Balderas MD Responsible Internal Medicine [phone]            See the Encounter Report to view Anticoagulation Flowsheet and Dosing Calendar (Go to Encounters tab in chart review, and find the Anticoagulation Therapy Visit)        Beverley Contreras, RN

## 2017-08-16 NOTE — NURSING NOTE
Chief Complaint   Patient presents with     RECHECK     Bronchitis       Initial BP 96/67 (BP Location: Right arm, Patient Position: Chair, Cuff Size: Adult Regular)  Pulse 85  Temp 96.9  F (36.1  C) (Oral)  Wt 191 lb 9.6 oz (86.9 kg)  SpO2 94%  BMI 37.42 kg/m2 Estimated body mass index is 37.42 kg/(m^2) as calculated from the following:    Height as of 1/4/17: 5' (1.524 m).    Weight as of this encounter: 191 lb 9.6 oz (86.9 kg).  Medication Reconciliation: complete    Coreen Vergara CMA

## 2017-08-16 NOTE — PROGRESS NOTES
ANTICOAGULATION FOLLOW-UP CLINIC VISIT    Patient Name:  Sonny Vivar  Date:  8/16/2017  Contact Type:  Face to Face    SUBJECTIVE:     Patient Findings     Comments INR 2.7 with adjusted dose. 2 more doses left of Levaquin. Dose adjusted and recheck in 5 days.            OBJECTIVE    INR Protime   Date Value Ref Range Status   08/16/2017 2.7 (A) 0.86 - 1.14 Final       ASSESSMENT / PLAN  INR assessment THER    Recheck INR In: 5 DAYS    INR Location Clinic      Anticoagulation Summary as of 8/16/2017     INR goal 2.0-3.0   Today's INR 2.7   Maintenance plan 3 mg (3 mg x 1) on Sun, Tue, Thu; 1.5 mg (3 mg x 0.5) all other days   Full instructions 8/17: 1.5 mg; 8/20: 1.5 mg; Otherwise 3 mg on Sun, Tue, Thu; 1.5 mg all other days   Weekly total 15 mg   Plan last modified Beverley Contreras RN (7/19/2017)   Next INR check 8/21/2017   Target end date     Indications   Pulmonary embolism and infarction (H) [I26.99]  Long-term (current) use of anticoagulants [Z79.01] [Z79.01]         Anticoagulation Episode Summary     INR check location     Preferred lab     Send INR reminders to [facility]    Comments       Anticoagulation Care Providers     Provider Role Specialty Phone number    Wes Camara MD Referring Internal Medicine [phone]    Genna Balderas MD Responsible Internal Medicine [phone]            See the Encounter Report to view Anticoagulation Flowsheet and Dosing Calendar (Go to Encounters tab in chart review, and find the Anticoagulation Therapy Visit)        Beverley Contreras RN

## 2017-08-16 NOTE — MR AVS SNAPSHOT
After Visit Summary   8/16/2017    Sonny Vivar    MRN: [id number]           Patient Information     Date Of Birth          1965        Visit Information        Provider Department      8/16/2017 3:00 PM Genna Balderas MD [facility]        Today's Diagnoses     Right-sided heart failure (H)    -  1    Acute on chronic respiratory failure with hypoxia and hypercapnia (H)          Care Instructions    We will advise the next visit based on the labs, and if we need to change the bumex dose.   Continue the Bumex at 3 mg twice a day for now and complete the antibiotics course.             Follow-ups after your visit        Your next 10 appointments already scheduled     Aug 21, 2017  2:15 PM CDT   [facility]agulation [facility] with [facility]   [facility]    [address] [facility] [address] [phone]              Who to contact     If you have questions or need follow up information about today's clinic visit or your schedule please contact [facility] directly at [phone].  Normal or non-critical lab and imaging results will be communicated to you by [employer], letter or phone within 4 business days after the clinic has received the results. If you do not hear from us within 7 days, please contact the clinic through [employer] or phone. If you have a critical or abnormal lab result, we will notify you by phone as soon as possible.  Submit refill requests through [facility] or call your pharmacy and they will forward the refill request to us. Please allow 3 business days for your refill to be completed.          Additional Information About Your Visit        MyChart Information     [facility] gives you secure access to your electronic health record. If you see a primary care provider, you can also send messages to your care team and make appointments. If you have questions, please call your primary care clinic.  If  you do not have a primary care provider, please call [phone] and they will assist you.        Care EveryWhere ID     This is your Care EveryWhere ID. This could be used by other organizations to access your [facility] medical records  [id number]        Your Vitals Were     Pulse Temperature Pulse Oximetry BMI (Body Mass Index)          85 96.9  F (36.1  C) (Oral) 94% 37.42 kg/m2         Blood Pressure from Last 3 Encounters:   08/16/17 96/67   08/09/17 91/62   08/02/17 97/59    Weight from Last 3 Encounters:   08/16/17 191 lb 9.6 oz (86.9 kg)   08/09/17 200 lb 12.8 oz (91.1 kg)   08/02/17 195 lb (88.5 kg)              We Performed the Following     ALT     BNP-N terminal pro     CBC with platelets and differential     LDL cholesterol direct          Today's Medication Changes          These changes are accurate as of: 8/16/17  3:42 PM.  If you have any questions, ask your nurse or doctor.               These medicines have changed or have updated prescriptions.        Dose/Directions    bumetanide 2 MG tablet   Commonly known as:  BUMEX   This may have changed:  additional instructions   Used for:  Pulmonary hypertension (H)        Dose:  2 mg   Take 1 tablet (2 mg) by mouth 2 times daily Take 1.5 tablets (3 mg) by mouth 2 times daily   Quantity:  180 tablet   Refills:  1                Primary Care Provider Office Phone # Fax #    Genna Balderas [phone] [address] [facility] [address]        Equal Access to Services     [facility] FERMIN AH: Hadii pablo barnetto Sodiane, waaxda luqadaha, qaybta kaalmada adeegyada, radha sam. So [facility] [phone].    ATENCIÓN: Si habla español, tiene a valentine disposición servicios gratuitos de asistencia lingüística. Llame al [phone].    We comply with applicable federal civil rights laws and [place] laws. We do not discriminate on the basis of race, color, national origin, age, disability sex, sexual  orientation or gender identity.            Thank you!     Thank you for choosing [facility]  for your care. Our goal is always to provide you with excellent care. Hearing back from our patients is one way we can continue to improve our services. Please take a few minutes to complete the written survey that you may receive in the mail after your visit with us. Thank you!             Your Updated Medication List - Protect others around you: Learn how to safely use, store and throw away your medicines at www.disposemymeds.org.          This list is accurate as of: 8/16/17  3:42 PM.  Always use your most recent med list.                   Brand Name Dispense Instructions for use Diagnosis    ACE/ARB NOT PRESCRIBED (INTENTIONAL)      Please choose reason not prescribed, below    Acute on chronic systolic heart failure (H)       acetaminophen 325 MG tablet    TYLENOL     Take 325-650 mg by mouth every 6 hours as needed for mild pain        acetaZOLAMIDE 250 MG tablet    DIAMOX    90 tablet    Take 1 tablet (250 mg) by mouth daily    Pulmonary hypertension (H)       allopurinol 300 MG tablet    ZYLOPRIM    7 tablet    Take 1 tablet (300 mg) by mouth daily    Idiopathic gout, unspecified chronicity, unspecified site       amoxicillin 500 MG capsule    AMOXIL    42 capsule    TAKE ONE CAPSULE BY MOUTH THREE TIMES DAILY. 2 WEEKS ON, THEN 2 WEEKS OFF    Acquired bronchiectasis (H), Hypoventilation associated with obesity (H)       BETA BLOCKER NOT PRESCRIBED (INTENTIONAL)      Beta Blocker not prescribed intentionally due to Evidence of fluid overload or volume depletion    Hypertension, benign       bumetanide 2 MG tablet    BUMEX    180 tablet    Take 1 tablet (2 mg) by mouth 2 times daily Take 1.5 tablets (3 mg) by mouth 2 times daily    Pulmonary hypertension (H)       ipratropium - albuterol 0.5 mg/2.5 mg/3 mL 0.5-2.5 (3) MG/3ML neb solution    DUONEB    360 mL    INHALE 1 VIAL BY NEBULIZATION EVERY 6 HOURS  AS NEEDED. USE FOR INCREASED COUGH AND/OR CONGESTION    Acquired bronchiectasis (H), Pulmonary hypertension (H)       levofloxacin 250 MG tablet    LEVAQUIN    7 tablet    Take 1 tablet (250 mg) by mouth daily For 7 days, starting 8.12.17    Bronchitis       Multi-vitamin Tabs tablet      Take 2 tablets by mouth daily        nebulizer/adult mask Kit     1 kit    1 Units 4 times daily.    On supplemental oxygen therapy, Recurrent pulmonary embolism (H), Pulmonary hypertension (H)       omeprazole 40 MG capsule    priLOSEC    90 capsule    TAKE ONE CAPSULE BY MOUTH DAILY. TAKE 30-60 MINUTES BEFORE A MEAL    Gastroesophageal reflux disease, esophagitis presence not specified       * order for DME     1 Device    Dispense one handheld pulse oximeter for home use.  Check oxygen saturation daily.  Notify primary MD clinic if oxygen regularly <90% or <88% at any time, and increase supplemental oyxgen.    Recurrent pulmonary embolism (H), Bronchiectasis (H), Pulmonary hypertension (H)       * order for DME     1 Units    Equipment being ordered: Tubing for nebulizer and accessories.    Pulmonary hypertension (H), On supplemental oxygen therapy, Hypoventilation associated with obesity (H), Acquired bronchiectasis (H)       * order for DME      3 L continuous.    Need for prophylactic vaccination with tetanus-diphtheria (TD), Thiazide diuretics causing adverse effect in therapeutic use       * order for DME     1 Device    Equipment being ordered:  Home Oxygen (same home and transportable oxygen tanks and equipment which the patient currently has, as of: February 26, 2017). 3 liters per minute of oxygen at rest and on ambulaton via nasal canula. Continuous. Need for 99 months.  Please fax this order, along with Dr Balderas' last OV note from 2.22.17, to: [facility]: fax: [phone].    Acute on chronic respiratory failure with hypoxia and hypercapnia (H)       * order for DME     2 Units     Equipment being ordered:1 pair (2 units) of knee high compression stockings, 10-15mm Hg, to be worn in the daytime only on both legs, closed-toe.    Lymphedema       * order for DME     1 Units    Tubing for nebulizer    Acquired bronchiectasis (H)       potassium chloride 20 MEQ Packet    KLOR-CON    180 tablet    Take 20 mEq by mouth 2 times daily dispense tablets not packets.    Pulmonary hypertension (H)       sildenafil 20 MG tablet    REVATIO/VIAGRA    180 tablet    Take 1 tablet (20 mg) by mouth 3 times daily 2/22/2017 patient takes one tablet 3 times daily    Pulmonary hypertension (H)       simvastatin 20 MG tablet    ZOCOR    39 tablet    TAKE 1 TABLET(20 MG) BY MOUTH 3 TIMES A WEEK    Hyperlipidemia LDL goal <130       warfarin 3 MG tablet    COUMADIN    150 tablet    Take 3 mg su,tu,th and 1.5 mg m,w,f,sa    Long term (current) use of anticoagulants, Pulmonary embolism and infarction (H)       * Notice:  This list has 6 medication(s) that are the same as other medications prescribed for you. Read the directions carefully, and ask your doctor or other care provider to review them with you.

## 2017-08-16 NOTE — MR AVS SNAPSHOT
Sonny Vivar   8/16/2017 2:45 PM   Anticoagulation Therapy Visit    Description:  51 year old male   Provider:  AN ANTI COAG   Department:  An Nurse           INR as of 8/16/2017     Today's INR 2.7      Anticoagulation Summary as of 8/16/2017     INR goal 2.0-3.0   Today's INR 2.7   Full instructions 8/17: 1.5 mg; 8/20: 1.5 mg; Otherwise 3 mg on Sun, Tue, Thu; 1.5 mg all other days   Next INR check 8/21/2017    Indications   Pulmonary embolism and infarction (H) [I26.99]  Long-term (current) use of anticoagulants [Z79.01] [Z79.01]         Your next Anticoagulation Clinic appointment(s)     Aug 21, 2017  2:15 PM CDT   [facility]    [address] [facility] [address] [phone]              Contact Numbers     [facility]  Please call  [phone] to cancel and/or reschedule your appointment, or with any problems or questions regarding your therapy.        August 2017 Details    Sun Mon Tue Wed Thu Fri Sat       1               2               3               4               5                 6               7               8               9               10               11               12                 13               14               15               16      1.5 mg   See details      17      1.5 mg         18      1.5 mg         19      1.5 mg           20      1.5 mg         21            22               23               24               25               26                 27               28               29               30               31                  Date Details   08/16 This INR check       Date of next INR:  8/21/2017         How to take your warfarin dose     To take:  1.5 mg Take 0.5 of a 3 mg tablet.

## 2017-08-16 NOTE — PATIENT INSTRUCTIONS
We will advise the next visit based on the labs, and if we need to change the bumex dose.   Continue the Bumex at 3 mg twice a day for now and complete the antibiotics course.

## 2017-08-17 NOTE — PROGRESS NOTES
Dear Sonny,     Your test results are attached.    Your labwork is overall improved/stable. (Kidney function improved; proBNP may be residually high from your resolving bronchitis; your platelet number-this is a type of cell which helps with blood clotting-was slightly low).  Please continue the Bumex at 3mg twice a day, and return to clinic in a month. Please also schedule non-fasting labs about 2 days prior to that appointment, so we will have the lab results when you come. You may use [employer] to schedule both of these appointments.    Please notify me via [employer] or contact the clinic at [phone] if you have any questions.    Genna Balderas MD

## 2017-08-21 NOTE — MR AVS SNAPSHOT
Sonny Vivar   8/21/2017 2:15 PM   Anticoagulation Therapy Visit    Description:  51 year old male   Provider:  AN ANTI COAG   Department:  An Nurse           INR as of 8/21/2017     Today's INR 2.1      Anticoagulation Summary as of 8/21/2017     INR goal 2.0-3.0   Today's INR 2.1   Full instructions 3 mg on Sun, Thu; 1.5 mg all other days   Next INR check 8/28/2017    Indications   Pulmonary embolism and infarction (H) [I26.99]  Long-term (current) use of anticoagulants [Z79.01] [Z79.01]         Your next Anticoagulation Clinic appointment(s)     Aug 28, 2017  2:30 PM CDT   [facility]AG   [facility]    [address] [facility] [address] [phone]              Contact Numbers     [facility]  Please call  [phone] to cancel and/or reschedule your appointment, or with any problems or questions regarding your therapy.        August 2017 Details    Sun Mon Tue Wed Thu Fri Sat       1               2               3               4               5                 6               7               8               9               10               11               12                 13               14               15               16               17               18               19                 20               21      1.5 mg   See details      22      1.5 mg         23      1.5 mg         24      3 mg         25      1.5 mg         26      1.5 mg           27      3 mg         28            29               30               31                  Date Details   08/21 This INR check       Date of next INR:  8/28/2017         How to take your warfarin dose     To take:  1.5 mg Take 0.5 of a 3 mg tablet.    To take:  3 mg Take 1 of the 3 mg tablets.

## 2017-08-21 NOTE — PROGRESS NOTES
ANTICOAGULATION FOLLOW-UP CLINIC VISIT    Patient Name:  Sonny Vivar  Date:  8/21/2017  Contact Type:  Face to Face    SUBJECTIVE:     Patient Findings     Comments INR 2.1. Dose was decrease last week for supra result and Levaquin. He finished levaquin. Will adjust weekly dose and recheck in 1 wk. Here with sister Sarah.            OBJECTIVE    INR Protime   Date Value Ref Range Status   08/21/2017 2.1 (A) 0.86 - 1.14 Final       ASSESSMENT / PLAN  INR assessment THER    Recheck INR In: 1 WEEK    INR Location Clinic      Anticoagulation Summary as of 8/21/2017     INR goal 2.0-3.0   Today's INR 2.1   Maintenance plan 3 mg (3 mg x 1) on Sun, Thu; 1.5 mg (3 mg x 0.5) all other days   Full instructions 3 mg on Sun, Thu; 1.5 mg all other days   Weekly total 13.5 mg   Plan last modified Beverley Contreras RN (8/21/2017)   Next INR check 8/28/2017   Target end date     Indications   Pulmonary embolism and infarction (H) [I26.99]  Long-term (current) use of anticoagulants [Z79.01] [Z79.01]         Anticoagulation Episode Summary     INR check location     Preferred lab     Send INR reminders to [facility]AG CL[facility]    Comments       Anticoagulation Care Providers     Provider Role Specialty Phone number    Wes Camara MD Referring Internal Medicine [phone]    Genna Balderas MD Responsible Internal Medicine [phone]            See the Encounter Report to view Anticoagulation Flowsheet and Dosing Calendar (Go to Encounters tab in chart review, and find the Anticoagulation Therapy Visit)    Dosage adjustment made based on physician directed care plan.    Beverley Contreras RN

## 2017-08-23 NOTE — TELEPHONE ENCOUNTER
"Per Sarah.  Patient at work program right now.  States had c/o not feeling well.  Was feeling weak/dizzy.  States they checked his oxygen sat and was 99% but his pulse is 199.  Not sure if BP was checked.  She knows nothing more but states \"his oxygen is never as high as 99\".  I told her I am more concerned with heart rate.  Unsure of respiratory status.  He is an adult with Trisomy 13, chronic A fib, and lots of other health issues. I instructed Sarah that he has to go to the ED now.  Do not bring to the clinic.  She states understanding of instructions.  Keisha Acosta RN    "

## 2017-08-28 NOTE — TELEPHONE ENCOUNTER
Spoke with patient's sister. Admitted to hospital last Wednesday with rapid heart rate. Has been in hospital since then and had diuretics and lots of fluid was taken off. INR cancelled for today. Advised to let us know when he is discharged and per those instructions we can then scheduled next INR apt. Beverley Contreras RN

## 2017-08-28 NOTE — TELEPHONE ENCOUNTER
Reason for Call:  Other     Detailed comments: sister Chio would like to know when to schedule appointment that was cancelled today due to patient is in hospital     Phone Number Patient can be reached at: Cell number on file:    Telephone Information:   Mobile [phone]       Best Time:     Can we leave a detailed message on this number? YES    Call taken on 8/28/2017 at 9:37 AM by Pallavi Montoya

## 2017-08-31 NOTE — PROGRESS NOTES
SUBJECTIVE:   Sonny Vivar is a 51 year old male who presents to clinic today for the following health issues:      The patient is accompanied by his sister.      Hospital Follow-up Visit:    Hospital/Nursing Home/IP Rehab Facility: [facility]  Date of Admission: 8/23/2017  Date of Discharge: 8/29/2017  Reason(s) for Admission: heart failure            Problems taking medications regularly:  None       Medication changes since discharge: discontinued  Sildenafil and adjusted dose of bumex      Problems adhering to non-medication therapy:  None    Summary of hospitalization:  CareEverywhere information obtained and reviewed  Diagnostic Tests/Treatments reviewed.  Follow-up needed: PCP, INR redosing (already completed by our INR  RN today),advised repeat BMP 2 weeks after hospital discharge, advised close follow-up with Cardiology as sildenafil was d/bear in the hospital.  Other Healthcare Providers Involved in Patient s Care:         sister who is his usual home caregiver.  Of note, his sister is going on a several week trip to [facility] starting next week and the patient will be in a respite care facility. His sister is currently getting together a list of instructions regarding how to care for him (including CHF instructions-fluid, salt restriction and daily weights, watching for s/s of acute CHF) to give to the respite care provider.   Update since discharge: improved.     Post Discharge Medication Reconciliation: discharge medications reconciled and changed, per note/orders (see AVS).  Plan of care communicated with patient and family     Coding guidelines for this visit:  Type of Medical   Decision Making Face-to-Face Visit       within 7 Days of discharge Face-to-Face Visit        within 14 days of discharge   Moderate Complexity [id number]   High Complexity 14160 [id number]          In the past 3-4 weeks, we had treated the patient for bronchitis (with abx and prednisone) and ultimately increased his PO bumex from  "2mg bid to 3mg bid 2-3 weeks on this regimen (with a several pound weight loss, though not approaching his previous weight of around 178-179 pounds) and on 8.23.17,  he was noted to be transiently tachycardic to the 180s(?) and one of his hands was noted to be transiently cyanotic at work, he was taken to the ED, CHEST X-RAY showed acute CHF, pro BNP was: 2550 as in-patient on admission, which was actually lower than the proBNP of 3557 on 8.16.17 in our clinic-it's possible that the patient was diuresing more on the 3mg bid PO bumex dose, but not enough to gain control of his CHF and ultimately required IV bumex diuresis, coupled with an additional need for very close follow-up on this diuresis, as he has a tendency to also go hypotensive (as he has right-sided and thus preload, volume dependent CHF).  Patient diuresed 10 pounds on IV bumex (was increased from his prior bumex dose of 3mg bid po to 3mg bid *IV*) as in-patient. Then revatio was stopped d/t hypotension and he was discharged on bumex  2mg bid (again d/t soft BPs) with a dry weight in the hospital of 183lb, and at home of 178-179lb.    Shortness of breath has improved in the hostpital and has remained good since then.  No dizziness since the hospital discharge.   Cannot get in with Cardiology attending MD until in a few weeks from now. We have decided to keep the patient off of Revatio for a few weeks and the family will report any new s/s of pulm hypertension to our clinic (may restart earlier if this happens).    Also, while in-patient, he was advised to try to keep a lower O2 sat goal to 88-92%-I did not see recent ABGs, but I believe they were likely basing it on ABGs from 10/2016 (also at [facility]) which suggested that he is a chronic CO2 retainer.     He also got \"Jobst\" compression stockings as in-patient but his other ones are more comfortable and not as high and itchy.         Problem list and histories reviewed & adjusted, as " indicated.  Additional history: as documented    Patient Active Problem List   Diagnosis     Mental handicap     Pulmonary hypertension (H)     Acquired bronchiectasis (H)     Hypoventilation associated with obesity (H)     Post-phlebitic syndrome     Hyperlipidemia LDL goal <130     Dry eye syndrome     Punctate keratitis due to dry eyes and O2 use     Posterior subcapsular polar senile cataract     Macular scar     Obesity, morbid (more than 100 lbs over ideal weight or BMI > 40) (H)     Long-term (current) use of anticoagulants, INR goal 2.0-3.0     On supplemental oxygen therapy     Trisomy 21 syndrome     Abnormal CT scan of lung     Recurrent pulmonary embolism (H)     Anisometropia     Encounter for counseling     Primary hypercoagulable state (H)     Hypertension, benign     Pulmonary embolism and infarction (H)     Venous (peripheral) insufficiency     Obstructive sleep apnea     Encounter for counseling     Dependence on supplemental oxygen     Bronchiectasis (H)     Long-term (current) use of anticoagulants [Z79.01]     Idiopathic gout, unspecified chronicity, unspecified site     Gastroesophageal reflux disease, esophagitis presence not specified     Chronic atrial fibrillation (H)     Bilateral lower extremity edema     Deep vein thrombosis (DVT) of other vein of lower extremity (H)     Acute on chronic systolic heart failure (H)     Advance care planning     Health Care Home     BP check     Acute on chronic respiratory failure with hypoxia and hypercapnia (H)     Right-sided heart failure (H)     Venous stasis ulcers, unspecified laterality     Past Surgical History:   Procedure Laterality Date     SURGICAL HISTORY OF -       thoractomy for lung infection     THORACIC SURGERY  Feb 1995    Done at [facility]       Social History   Substance Use Topics     Smoking status: Never Smoker     Smokeless tobacco: Never Used      Comment: Lives in smoke free household     Alcohol use No     Family History    Problem Relation Age of Onset     HEART DISEASE Mother      Unknown/Adopted No family hx of      CANCER No family hx of      DIABETES No family hx of      Hypertension No family hx of      CEREBROVASCULAR DISEASE No family hx of      Thyroid Disease No family hx of      Glaucoma No family hx of      Macular Degeneration No family hx of          Current Outpatient Prescriptions   Medication Sig Dispense Refill     order for DME Tubing for nebulizer 1 Units 0     warfarin (COUMADIN) 3 MG tablet Take 3 mg valentine,tu,th and 1.5 mg m,w,f,sa 150 tablet 1     order for DME Equipment being ordered:1 pair (2 units) of knee high compression stockings, 10-15mm Hg, to be worn in the daytime only on both legs, closed-toe. 2 Units 0     bumetanide (BUMEX) 2 MG tablet Take 1 tablet (2 mg) by mouth 2 times daily Take 1.5 tablets (3 mg) by mouth 2 times daily (Patient taking differently: Take 2 mg by mouth 2 times daily AS of 9/1/2018 patient takes 1 tablet twice daily.     AS of 8/9/2017 patient takes 1.5 tablets 2 times a day.    Take 1.5 tablets (3 mg) by mouth 2 times daily) 180 tablet 1     acetaZOLAMIDE (DIAMOX) 250 MG tablet Take 1 tablet (250 mg) by mouth daily 90 tablet 0     potassium chloride (KLOR-CON) 20 MEQ Packet Take 20 mEq by mouth 2 times daily dispense tablets not packets. 180 tablet 0     omeprazole (PRILOSEC) 40 MG capsule TAKE ONE CAPSULE BY MOUTH DAILY. TAKE 30-60 MINUTES BEFORE A MEAL 90 capsule 3     amoxicillin (AMOXIL) 500 MG capsule TAKE ONE CAPSULE BY MOUTH THREE TIMES DAILY. 2 WEEKS ON, THEN 2 WEEKS OFF 42 capsule 1     simvastatin (ZOCOR) 20 MG tablet TAKE 1 TABLET(20 MG) BY MOUTH 3 TIMES A WEEK 39 tablet 3     allopurinol (ZYLOPRIM) 300 MG tablet Take 1 tablet (300 mg) by mouth daily 7 tablet 0     BETA BLOCKER NOT PRESCRIBED, INTENTIONAL, Beta Blocker not prescribed intentionally due to Evidence of fluid overload or volume depletion  0     ACE/ARB NOT PRESCRIBED, INTENTIONAL, Please choose reason not  prescribed, below       order for DME Equipment being ordered:   Home Oxygen (same home and transportable oxygen tanks and equipment which the patient currently has, as of: February 26, 2017).  3 liters per minute of oxygen at rest and on ambulaton via nasal canula.  Continuous.  Need for 99 months.    Please fax this order, along with Dr Balderas' last OV note from 2.22.17, to: [facility]: fax: [phone]. 1 Device 0     acetaminophen (TYLENOL) 325 MG tablet Take 325-650 mg by mouth every 6 hours as needed for mild pain       multivitamin, therapeutic with minerals (MULTI-VITAMIN) TABS Take 2 tablets by mouth daily        ipratropium - albuterol 0.5 mg/2.5 mg/3 mL (DUONEB) 0.5-2.5 (3) MG/3ML nebulization INHALE 1 VIAL BY NEBULIZATION EVERY 6 HOURS AS NEEDED. USE FOR INCREASED COUGH AND/OR CONGESTION 360 mL 1     ORDER FOR DME Equipment being ordered: Tubing for nebulizer and accessories. 1 Units 1     ORDER FOR DME Dispense one handheld pulse oximeter for home use.  Check oxygen saturation daily.  Notify primary MD clinic if oxygen regularly <90% or <88% at any time, and increase supplemental oyxgen. 1 Device 0     Respiratory Therapy Supplies (NEBULIZER/ADULT MASK) KIT 1 Units 4 times daily. 1 kit 0     ORDER FOR DME 3 L continuous.       sildenafil (REVATIO/VIAGRA) 20 MG tablet Take 1 tablet (20 mg) by mouth 3 times daily 2/22/2017 patient takes one tablet 3 times daily (Patient not taking: Reported on 9/1/2017) 180 tablet 1         Reviewed and updated as needed this visit by clinical staff       Reviewed and updated as needed this visit by Provider    ==============================================================  ROS:  Constitutional, HEENT, cardiovascular, pulmonary, GI, , musculoskeletal, neuro, skin, endocrine and psych systems are negative, except as otherwise noted.         OBJECTIVE:                                                    BP 95/68 (BP Location: Left arm, Patient  Position: Chair, Cuff Size: Adult Regular)  Pulse 81  Wt 179 lb 6.4 oz (81.4 kg)  SpO2 93%  BMI 35.04 kg/m2  Body mass index is 35.04 kg/(m^2).     Wt Readings from Last 4 Encounters:   09/01/17 179 lb 6.4 oz (81.4 kg)   08/16/17 191 lb 9.6 oz (86.9 kg)   08/09/17 200 lb 12.8 oz (91.1 kg)   08/02/17 195 lb (88.5 kg)     GENERAL APPEARANCE: healthy, alert and in no distress; on O2 via NC.  EYES: Eyes grossly normal to inspection, and conjunctivae and sclerae normal  HENT: head normocephalic and atraumatic and mouth without ulcers or lesions, oropharynx,no throat erythema,  and oral mucous membranes moist  NECK: no noticeable adenopathy, no asymmetry, masses, or scars    RESP:   No wheezes (as opposed to last week)  CV: regular rate and rhythm, normal S1 S2, no S3 or S4, no murmur, click or rub, +1-+2 pitting b/l pedal peripheral edema with chronic venous stasis changes [same as the 6.21.17 exam]  ABDOMEN: soft, nontender, no hepatosplenomegaly, no masses and bowel sounds normal  MS: no musculoskeletal defects are noted and gait is age appropriate without ataxia  SKIN:   +2 b/l lower extremity edema  [wearing compression stockings]  o/w, no suspicious lesions or rashes  NEURO: mentation intact and speech normal  PSYCH: mentation appears normal and affect normal/bright.     ASSESSMENT/PLAN:                                                        ICD-10-CM    1. Right-sided heart failure (H) I50.9    2. Chronic atrial fibrillation (H) I48.2    3. Pulmonary hypertension (H) I27.2      (I50.9) Right-sided heart failure (H)  (primary encounter diagnosis)  Comment: we were making modest improvements in the patient's acute on chronic CHF this month but possibly the a fib (triggered by hypoxia or by the fluid overload), triggered a worsening of the patient' CHF, requiring IV diuresis...  Plan:   As per orders above and patient instructions below.     (I48.2) Chronic atrial fibrillation (H)  Comment: Currently  rate-controlled. On warfarin. We were making modest improvements in the patient's acute on chronic CHF this month but possibly the a fib (triggered by hypoxia or by the fluid overload), triggered a worsening of the patient' CHF, requiring IV diuresis...  Plan:   As per orders above and patient instructions below.     (I27.2) Pulmonary hypertension (H)  Comment: Multifactorial and per the recent in-patient ECHO, still listed as severe (apparently has not ever had a right side heart cath to confirm the exact Pulm HTN pressure). Sildenafil stopped as inpt d/t soft BPs. No noted deterioration in Pulm hypertension symptoms thus far   Plan:   As per orders above and patient instructions below.   Patient and family were advised to watch for and report any new/worsening symptoms of HF (=pulm hypertension).         Patient Instructions   Please see your Cardiologist, Dr Coleman after your sister's trip, at the end of September.   Keep your appointment with the Cardiology Nurse Practitioner next week.     We can see you 2-3 weeks after you see Dr Coleman.    Continue to follow-up with our INR clinic.                   Genna Balderas MD  [facility]

## 2017-09-01 NOTE — MR AVS SNAPSHOT
Sonny Vivar   9/1/2017 11:15 AM   Anticoagulation Therapy Visit    Description:  51 year old male   Provider:  [facility]   Department:  An Nurse           INR as of 9/1/2017     Today's INR 3.4!      Anticoagulation Summary as of 9/1/2017     INR goal 2.0-3.0   Today's INR 3.4!   Full instructions 9/1: Hold; Otherwise 3 mg on Sun, Thu; 1.5 mg all other days   Next INR check 9/13/2017    Indications   Pulmonary embolism and infarction (H) [I26.99]  Long-term (current) use of anticoagulants [Z79.01] [Z79.01]         Your next Anticoagulation Clinic appointment(s)     Sep 13, 2017  3:00 PM CDT   [facility]    [address] [facility] [address] [phone]              Contact Numbers     [facility]  Please call  [phone] to cancel and/or reschedule your appointment, or with any problems or questions regarding your therapy.        September 2017 Details    Sun Mon Tue Wed Thu Fri Sat          1      Hold   See details      2      1.5 mg           3      3 mg         4      1.5 mg         5      1.5 mg         6      1.5 mg         7      3 mg         8      1.5 mg         9      1.5 mg           10      3 mg         11      1.5 mg         12      1.5 mg         13            14               15               16                 17               18               19               20               21               22               23                 24               25               26               27               28               29               30                Date Details   09/01 This INR check       Date of next INR:  9/13/2017         How to take your warfarin dose     To take:  1.5 mg Take 0.5 of a 3 mg tablet.    To take:  3 mg Take 1 of the 3 mg tablets.    Hold Do not take your warfarin dose. See the Details table to the right for additional instructions.

## 2017-09-01 NOTE — PATIENT INSTRUCTIONS
Please see your Cardiologist, Dr Coleman after your sister's trip, at the end of September.   Keep your appointment with the Cardiology Nurse Practitioner next week.     We can see you 2-3 weeks after you see Dr Coleman.    Continue to follow-up with our INR clinic.

## 2017-09-01 NOTE — PROGRESS NOTES
ANTICOAGULATION FOLLOW-UP CLINIC VISIT    Patient Name:  Sonny Vivar  Date:  9/1/2017  Contact Type:  Face to Face    SUBJECTIVE:     Patient Findings     Comments INR 3.4. Seeing provider today for hospital follow up/CHF. Here with sister. Will adjust dose today and recheck in 10 days. sister will be leaving on trip next week and patient will come to next appt with respite caretaker.            OBJECTIVE    INR Protime   Date Value Ref Range Status   09/01/2017 3.4 (A) 0.86 - 1.14 Final       ASSESSMENT / PLAN  INR assessment SUPRA    Recheck INR In: 10 DAYS    INR Location Clinic      Anticoagulation Summary as of 9/1/2017     INR goal 2.0-3.0   Today's INR 3.4!   Maintenance plan 3 mg (3 mg x 1) on Sun, Thu; 1.5 mg (3 mg x 0.5) all other days   Full instructions 9/1: Hold; Otherwise 3 mg on Sun, Thu; 1.5 mg all other days   Weekly total 13.5 mg   Plan last modified Beverley Contreras RN (8/21/2017)   Next INR check 9/13/2017   Target end date     Indications   Pulmonary embolism and infarction (H) [I26.99]  Long-term (current) use of anticoagulants [Z79.01] [Z79.01]         Anticoagulation Episode Summary     INR check location     Preferred lab     Send INR reminders to [facility]    Comments       Anticoagulation Care Providers     Provider Role Specialty Phone number    Wes Camara MD Referring Internal Medicine [phone]    Genna Balderas MD Responsible Internal Medicine [phone]            See the Encounter Report to view Anticoagulation Flowsheet and Dosing Calendar (Go to Encounters tab in chart review, and find the Anticoagulation Therapy Visit)    Dosage adjustment made based on physician directed care plan.    Beverley Contreras RN

## 2017-09-01 NOTE — MR AVS SNAPSHOT
After Visit Summary   9/1/2017    Sonyn Vivar    MRN: [id number]           Patient Information     Date Of Birth          1965        Visit Information        Provider Department      9/1/2017 11:30 AM Genna Balderas MD [facility]        Care Instructions    Please see Jared after your sister's trip, at the end of September.   Keep your appointment with the Cardiology Nurse Practitioner next week.     We can see you 2-3 weeks after you see the Cardiologist, Dr Coleman.    Continue to follow-up with our INR clinic.             Follow-ups after your visit        Your next 10 appointments already scheduled     Sep 13, 2017  3:00 PM CDT   Anticoagulation Visit with AN ANTI COAG   [facility] ([facility])    [address] [address] [phone]            Sep 27, 2017  2:15 PM CDT   Anticoagulation Visit with AN ANTI COAG   [facility] ([facility])    [address] [address] [phone]            Oct 03, 2017 11:20 AM CDT   New Visit with Coreen Yen OD   [facility] ([facility])    [address] [address] [phone]              Who to contact     If you have questions or need follow up information about today's clinic visit or your schedule please contact [facility] directly at [phone].  Normal or non-critical lab and imaging results will be communicated to you by MyChart, letter or phone within 4 business days after the clinic has received the results. If you do not hear from us within 7 days, please contact the clinic through MyChart or phone. If you have a critical or abnormal lab result, we will notify you by phone as soon as possible.  Submit refill requests through [employer] or call your pharmacy and they will forward the refill request to us. Please allow 3 business days for your  refill to be completed.          Additional Information About Your Visit        MyChart Information     [employer] gives you secure access to your electronic health record. If you see a primary care provider, you can also send messages to your care team and make appointments. If you have questions, please call your primary care clinic.  If you do not have a primary care provider, please call [phone] and they will assist you.        Care EveryWhere ID     This is your Care EveryWhere ID. This could be used by other organizations to access your [place] medical records  [id number]        Your Vitals Were     Pulse Pulse Oximetry BMI (Body Mass Index)             81 93% 35.04 kg/m2          Blood Pressure from Last 3 Encounters:   09/01/17 95/68   08/16/17 96/67   08/09/17 91/62    Weight from Last 3 Encounters:   09/01/17 179 lb 6.4 oz (81.4 kg)   08/16/17 191 lb 9.6 oz (86.9 kg)   08/09/17 200 lb 12.8 oz (91.1 kg)              Today, you had the following     No orders found for display         Today's Medication Changes          These changes are accurate as of: 9/1/17 12:33 PM.  If you have any questions, ask your nurse or doctor.               These medicines have changed or have updated prescriptions.        Dose/Directions    bumetanide 2 MG tablet   Commonly known as:  BUMEX   This may have changed:  additional instructions   Used for:  Pulmonary hypertension (H)        Dose:  2 mg   Take 1 tablet (2 mg) by mouth 2 times daily Take 1.5 tablets (3 mg) by mouth 2 times daily   Quantity:  180 tablet   Refills:  1                Primary Care Provider Office Phone # Fax #    Genna Balderas [phone] [address] [address] [facility] [address]        Equal Access to Services     [facility] FERMIN AH: Eduardo Bassett, mathieu palm, radha baeza. So [facility] [phone].    ATENCIÓN: Si sima gilbert, vasquez jones valentine  disposición servicios gratuitos de asistencia lingüística. Linwood hopper [phone].    We comply with applicable federal civil rights laws and [place] laws. We do not discriminate on the basis of race, color, national origin, age, disability sex, sexual orientation or gender identity.            Thank you!     Thank you for choosing [facility]  for your care. Our goal is always to provide you with excellent care. Hearing back from our patients is one way we can continue to improve our services. Please take a few minutes to complete the written survey that you may receive in the mail after your visit with us. Thank you!             Your Updated Medication List - Protect others around you: Learn how to safely use, store and throw away your medicines at www.disposemymeds.org.          This list is accurate as of: 9/1/17 12:33 PM.  Always use your most recent med list.                   Brand Name Dispense Instructions for use Diagnosis    ACE/ARB NOT PRESCRIBED (INTENTIONAL)      Please choose reason not prescribed, below    Acute on chronic systolic heart failure (H)       acetaminophen 325 MG tablet    TYLENOL     Take 325-650 mg by mouth every 6 hours as needed for mild pain        acetaZOLAMIDE 250 MG tablet    DIAMOX    90 tablet    Take 1 tablet (250 mg) by mouth daily    Pulmonary hypertension (H)       allopurinol 300 MG tablet    ZYLOPRIM    7 tablet    Take 1 tablet (300 mg) by mouth daily    Idiopathic gout, unspecified chronicity, unspecified site       amoxicillin 500 MG capsule    AMOXIL    42 capsule    TAKE ONE CAPSULE BY MOUTH THREE TIMES DAILY. 2 WEEKS ON, THEN 2 WEEKS OFF    Acquired bronchiectasis (H), Hypoventilation associated with obesity (H)       BETA BLOCKER NOT PRESCRIBED (INTENTIONAL)      Beta Blocker not prescribed intentionally due to Evidence of fluid overload or volume depletion    Hypertension, benign       bumetanide 2 MG tablet    BUMEX    180 tablet    Take 1 tablet (2  mg) by mouth 2 times daily Take 1.5 tablets (3 mg) by mouth 2 times daily    Pulmonary hypertension (H)       ipratropium - albuterol 0.5 mg/2.5 mg/3 mL 0.5-2.5 (3) MG/3ML neb solution    DUONEB    360 mL    INHALE 1 VIAL BY NEBULIZATION EVERY 6 HOURS AS NEEDED. USE FOR INCREASED COUGH AND/OR CONGESTION    Acquired bronchiectasis (H), Pulmonary hypertension (H)       Multi-vitamin Tabs tablet      Take 2 tablets by mouth daily        nebulizer/adult mask Kit     1 kit    1 Units 4 times daily.    On supplemental oxygen therapy, Recurrent pulmonary embolism (H), Pulmonary hypertension (H)       omeprazole 40 MG capsule    priLOSEC    90 capsule    TAKE ONE CAPSULE BY MOUTH DAILY. TAKE 30-60 MINUTES BEFORE A MEAL    Gastroesophageal reflux disease, esophagitis presence not specified       * order for DME     1 Device    Dispense one handheld pulse oximeter for home use.  Check oxygen saturation daily.  Notify primary MD clinic if oxygen regularly <90% or <88% at any time, and increase supplemental oyxgen.    Recurrent pulmonary embolism (H), Bronchiectasis (H), Pulmonary hypertension (H)       * order for DME     1 Units    Equipment being ordered: Tubing for nebulizer and accessories.    Pulmonary hypertension (H), On supplemental oxygen therapy, Hypoventilation associated with obesity (H), Acquired bronchiectasis (H)       * order for DME      3 L continuous.    Need for prophylactic vaccination with tetanus-diphtheria (TD), Thiazide diuretics causing adverse effect in therapeutic use       * order for DME     1 Device    Equipment being ordered:  Home Oxygen (same home and transportable oxygen tanks and equipment which the patient currently has, as of: February 26, 2017). 3 liters per minute of oxygen at rest and on ambulaton via nasal canula. Continuous. Need for 99 months.  Please fax this order, along with Dr Balderas' last OV note from 2.22.17, to: [facility]: fax: [phone].     Acute on chronic respiratory failure with hypoxia and hypercapnia (H)       * order for DME     2 Units    Equipment being ordered:1 pair (2 units) of knee high compression stockings, 10-15mm Hg, to be worn in the daytime only on both legs, closed-toe.    Lymphedema       * order for DME     1 Units    Tubing for nebulizer    Acquired bronchiectasis (H)       potassium chloride 20 MEQ Packet    KLOR-CON    180 tablet    Take 20 mEq by mouth 2 times daily dispense tablets not packets.    Pulmonary hypertension (H)       sildenafil 20 MG tablet    REVATIO/VIAGRA    180 tablet    Take 1 tablet (20 mg) by mouth 3 times daily 2/22/2017 patient takes one tablet 3 times daily    Pulmonary hypertension (H)       simvastatin 20 MG tablet    ZOCOR    39 tablet    TAKE 1 TABLET(20 MG) BY MOUTH 3 TIMES A WEEK    Hyperlipidemia LDL goal <130       warfarin 3 MG tablet    COUMADIN    150 tablet    Take 3 mg su,tu,th and 1.5 mg m,w,f,sa    Long term (current) use of anticoagulants, Pulmonary embolism and infarction (H)       * Notice:  This list has 6 medication(s) that are the same as other medications prescribed for you. Read the directions carefully, and ask your doctor or other care provider to review them with you.

## 2017-09-01 NOTE — NURSING NOTE
Chief Complaint   Patient presents with     Hospital F/U     [place] - Acute Heart Failure       Initial BP 95/68 (BP Location: Left arm, Patient Position: Chair, Cuff Size: Adult Regular)  Pulse 81  Wt 179 lb 6.4 oz (81.4 kg)  SpO2 93%  BMI 35.04 kg/m2 Estimated body mass index is 35.04 kg/(m^2) as calculated from the following:    Height as of 1/4/17: 5' (1.524 m).    Weight as of this encounter: 179 lb 6.4 oz (81.4 kg).  Medication Reconciliation: complete    Coreen Vergara CMA

## 2017-09-13 NOTE — MR AVS SNAPSHOT
After Visit Summary   9/13/2017    Sonny Vivar    MRN: [id number]           Patient Information     Date Of Birth          1965        Visit Information        Provider Department      9/13/2017 8:20 PM Steffany Fermin MD [facility]        Today's Diagnoses     Supratherapeutic INR    -  1    Trisomy 21 syndrome        Right-sided heart failure (H)           Follow-ups after your visit        Your next 10 appointments already scheduled     Sep 15, 2017 11:45 AM CDT   [facility]AG   [facility]    [address] [address]   [phone]            Sep 27, 2017  2:15 PM CDT   An[facility]agulation Visit with AN ANTI COAG   [facility]    [address] [address]   [phone]            Oct 03, 2017 11:00 AM CDT   New Visit with Coreen Yen OD   [facility]    [address] [address]   [phone]              Future tests that were ordered for you today     Open Future Orders        Priority Expected Expires Ordered    Hepatic panel (Albumin, ALT, AST, Bili, Alk Phos, TP) Routine 9/15/2017 9/13/2018 9/13/2017    INR Routine 9/15/2017 9/13/2018 9/13/2017            Who to contact     If you have questions or need follow up information about today's clinic visit or your schedule please contact [facility] directly at [phone].  Normal or non-critical lab and imaging results will be communicated to you by MyChart, letter or phone within 4 business days after the clinic has received the results. If you do not hear from us within 7 days, please contact the clinic through MyChart or phone. If you have a critical or abnormal lab result, we will notify you by phone as soon as possible.  Submit refill requests through [employer] or call your pharmacy and they will  forward the refill request to us. Please allow 3 business days for your refill to be completed.          Additional Information About Your Visit        [employer] Information     [employer] gives you secure access to your electronic health record. If you see a primary care provider, you can also send messages to your care team and make appointments. If you have questions, please call your primary care clinic.  If you do not have a primary care provider, please call [phone] and they will assist you.        Care EveryWhere ID     This is your Care EveryWhere ID. This could be used by other organizations to access your [place] medical records  [id number]        Your Vitals Were     Pulse Temperature Pulse Oximetry             67 96.2  F (35.7  C) (Tympanic) 95%          Blood Pressure from Last 3 Encounters:   09/13/17 138/84   09/01/17 95/68   08/16/17 96/67    Weight from Last 3 Encounters:   09/01/17 179 lb 6.4 oz (81.4 kg)   08/16/17 191 lb 9.6 oz (86.9 kg)   08/09/17 200 lb 12.8 oz (91.1 kg)              Today, you had the following     No orders found for display         Today's Medication Changes          These changes are accurate as of: 9/13/17 11:37 PM.  If you have any questions, ask your nurse or doctor.               Start taking these medicines.        Dose/Directions    phytonadione 5 MG tablet   Commonly known as:  MEPHYTON   Used for:  Supratherapeutic INR   Started by:  Steffany Fermin MD        Dose:  5 mg   Take 1 tablet (5 mg) by mouth once for 1 dose   Quantity:  1 tablet   Refills:  0         These medicines have changed or have updated prescriptions.        Dose/Directions    bumetanide 2 MG tablet   Commonly known as:  BUMEX   This may have changed:  additional instructions   Used for:  Pulmonary hypertension (H)        Dose:  2 mg   Take 1 tablet (2 mg) by mouth 2 times daily Take 1.5 tablets (3 mg) by mouth 2 times daily   Quantity:  180 tablet   Refills:  1            Where to get  your medicines      These medications were sent to [facility] - [place], [place] - [address] [address] [facility]  [address] [address] [facility] [facility] [address]     Phone:  [phone]     phytonadione 5 MG tablet                Primary Care Provider Office Phone # Fax #    Genna Balderas -[phone] [address] [address] [address]        Equal Access to Services     LEANN CHRISTENSEN : Hadii aad ku hadasho Soomaali, waaxda luqadaha, qaybta kaalmada adeegyada, waxay idiin hayaan adeeg kharash laharriett . So [facility] [phone].    ATENCIÓN: Si habla español, tiene a valentine disposición servicios gratuitos de asistencia lingüística. [facility] [phone].    We comply with applicable federal civil rights laws and [place] laws. We do not discriminate on the basis of race, color, national origin, age, disability sex, sexual orientation or gender identity.            Thank you!     Thank you for choosing [facility]  for your care. Our goal is always to provide you with excellent care. Hearing back from our patients is one way we can continue to improve our services. Please take a few minutes to complete the written survey that you may receive in the mail after your visit with us. Thank you!             Your Updated Medication List - Protect others around you: Learn how to safely use, store and throw away your medicines at www.disposemymeds.org.          This list is accurate as of: 9/13/17 11:37 PM.  Always use your most recent med list.                   Brand Name Dispense Instructions for use Diagnosis    ACE/ARB NOT PRESCRIBED (INTENTIONAL)      Please choose reason not prescribed, below    Acute on chronic systolic heart failure (H)       acetaminophen 325 MG tablet    TYLENOL     Take 325-650 mg by mouth every 6 hours as needed for mild pain        acetaZOLAMIDE 250 MG tablet    DIAMOX    90 tablet    Take 1 tablet (250 mg) by mouth daily     Pulmonary hypertension (H)       allopurinol 300 MG tablet    ZYLOPRIM    7 tablet    Take 1 tablet (300 mg) by mouth daily    Idiopathic gout, unspecified chronicity, unspecified site       amoxicillin 500 MG capsule    AMOXIL    42 capsule    TAKE ONE CAPSULE BY MOUTH THREE TIMES DAILY. 2 WEEKS ON, THEN 2 WEEKS OFF    Acquired bronchiectasis (H), Hypoventilation associated with obesity (H)       BETA BLOCKER NOT PRESCRIBED (INTENTIONAL)      Beta Blocker not prescribed intentionally due to Evidence of fluid overload or volume depletion    Hypertension, benign       bumetanide 2 MG tablet    BUMEX    180 tablet    Take 1 tablet (2 mg) by mouth 2 times daily Take 1.5 tablets (3 mg) by mouth 2 times daily    Pulmonary hypertension (H)       ipratropium - albuterol 0.5 mg/2.5 mg/3 mL 0.5-2.5 (3) MG/3ML neb solution    DUONEB    360 mL    INHALE 1 VIAL BY NEBULIZATION EVERY 6 HOURS AS NEEDED. USE FOR INCREASED COUGH AND/OR CONGESTION    Acquired bronchiectasis (H), Pulmonary hypertension (H)       Multi-vitamin Tabs tablet      Take 2 tablets by mouth daily        nebulizer/adult mask Kit     1 kit    1 Units 4 times daily.    On supplemental oxygen therapy, Recurrent pulmonary embolism (H), Pulmonary hypertension (H)       omeprazole 40 MG capsule    priLOSEC    90 capsule    TAKE ONE CAPSULE BY MOUTH DAILY. TAKE 30-60 MINUTES BEFORE A MEAL    Gastroesophageal reflux disease, esophagitis presence not specified       * order for DME     1 Device    Dispense one handheld pulse oximeter for home use.  Check oxygen saturation daily.  Notify primary MD clinic if oxygen regularly <90% or <88% at any time, and increase supplemental oyxgen.    Recurrent pulmonary embolism (H), Bronchiectasis (H), Pulmonary hypertension (H)       * order for DME     1 Units    Equipment being ordered: Tubing for nebulizer and accessories.    Pulmonary hypertension (H), On supplemental oxygen therapy, Hypoventilation associated with obesity  (H), Acquired bronchiectasis (H)       * order for DME      3 L continuous.    Need for prophylactic vaccination with tetanus-diphtheria (TD), Thiazide diuretics causing adverse effect in therapeutic use       * order for DME     1 Device    Equipment being ordered:  Home Oxygen (same home and transportable oxygen tanks and equipment which the patient currently has, as of: February 26, 2017). 3 liters per minute of oxygen at rest and on ambulaton via nasal canula. Continuous. Need for 99 months.  Please fax this order, along with Dr Balderas' last OV note from 2.22.17, to: [facility]: fax: [phone].    Acute on chronic respiratory failure with hypoxia and hypercapnia (H)       * order for DME     2 Units    Equipment being ordered:1 pair (2 units) of knee high compression stockings, 10-15mm Hg, to be worn in the daytime only on both legs, closed-toe.    Lymphedema       * order for DME     1 Units    Tubing for nebulizer    Acquired bronchiectasis (H)       phytonadione 5 MG tablet    MEPHYTON    1 tablet    Take 1 tablet (5 mg) by mouth once for 1 dose    Supratherapeutic INR       potassium chloride 20 MEQ Packet    KLOR-CON    180 tablet    Take 20 mEq by mouth 2 times daily dispense tablets not packets.    Pulmonary hypertension (H)       sildenafil 20 MG tablet    REVATIO/VIAGRA    180 tablet    Take 1 tablet (20 mg) by mouth 3 times daily 2/22/2017 patient takes one tablet 3 times daily    Pulmonary hypertension (H)       simvastatin 20 MG tablet    ZOCOR    39 tablet    TAKE 1 TABLET(20 MG) BY MOUTH 3 TIMES A WEEK    Hyperlipidemia LDL goal <130       warfarin 3 MG tablet    COUMADIN    150 tablet    Take 3 mg su,tu,th and 1.5 mg m,w,f,sa    Long term (current) use of anticoagulants, Pulmonary embolism and infarction (H)       * Notice:  This list has 6 medication(s) that are the same as other medications prescribed for you. Read the directions carefully, and ask your doctor or  other care provider to review them with you.

## 2017-09-13 NOTE — MR AVS SNAPSHOT
Sonny Vivar   9/13/2017 3:00 PM   Anticoagulation Therapy Visit    Description:  51 year old male   Provider:  AN ANTI COAG   Department:  An Nurse           INR as of 9/13/2017     Today's INR 9.14! (9/14/2017)      Anticoagulation Summary as of 9/13/2017     INR goal 2.0-3.0   Today's INR 9.14! (9/14/2017)   Full instructions 9/13: Hold; 9/14: Hold; Otherwise 3 mg on Sun, Thu; 1.5 mg all other days   Next INR check 9/15/2017    Indications   Pulmonary embolism and infarction (H) [I26.99]  Long-term (current) use of anticoagulants [Z79.01] [Z79.01]         Your next Anticoagulation Clinic appointment(s)     Sep 20, 2017  1:45 PM CDT   [facility]    [address] [address] [phone]            Sep 27, 2017  2:15 PM CDT   [facility]agulation [facility] with AN ANTI COAG   [facility]    [address] [address] [phone]              Contact Numbers     [facility]  Please call  [phone] to cancel and/or reschedule your appointment, or with any problems or questions regarding your therapy.        September 2017 Details    Sun Mon Tue Wed Thu Fri Sat          1               2                 3               4               5               6               7               8               9                 10               11               12               13               14               15               16                 17               18      1.5 mg   See details      19      1.5 mg         20            21               22               23                 24               25               26               27               28               29               30                Date Details   09/18 This INR check       Date of next INR:  9/20/2017         How to take your warfarin dose     To take:  1.5 mg Take 0.5 of a 3 mg tablet.

## 2017-09-14 NOTE — TELEPHONE ENCOUNTER
[facility] informed of provider note as below. Patient has INR appointment tomorrow, lab scheduled at 11:30 tomorrow.     - please fax results as noted below.  .Edie FARIAN, RN, CPN

## 2017-09-14 NOTE — PROGRESS NOTES
ANTICOAGULATION FOLLOW-UP CLINIC VISIT    Patient Name:  Sonny Vivar  Date:  9/14/2017  Contact Type:  Face to Face    SUBJECTIVE:     Patient Findings     Positives No Problem Findings    Comments Patient came in today with caregiver, Holly as sister is out of town. Denies symptoms of bleeding. INR reading on protime machine read >8, patient sent to lab. See telephone encounter.    .Edie SCHUMACHER, RN, CPN             OBJECTIVE    INR Protime   Date Value Ref Range Status   09/14/2017 9.14 (A) 0.86 - 1.14 Final       ASSESSMENT / PLAN  INR assessment SUPRA    Recheck INR In: 2 DAYS    INR Location Clinic      Anticoagulation Summary as of 9/13/2017     INR goal 2.0-3.0   Today's INR 9.14! (9/14/2017)   Maintenance plan 3 mg (3 mg x 1) on Sun, Thu; 1.5 mg (3 mg x 0.5) all other days   Full instructions 9/13: Hold; 9/14: Hold; Otherwise 3 mg on Sun, Thu; 1.5 mg all other days   Weekly total 13.5 mg   Plan last modified Beverley Contreras RN (8/21/2017)   Next INR check 9/15/2017   Target end date     Indications   Pulmonary embolism and infarction (H) [I26.99]  Long-term (current) use of anticoagulants [Z79.01] [Z79.01]         Anticoagulation Episode Summary     INR check location     Preferred lab     Send INR reminders to [facility] ANTICOAG CLINIC    Comments       Anticoagulation Care Providers     Provider Role Specialty Phone number    Wes Camara MD Referring Internal Medicine [phone]    Genna Balderas MD Responsible Internal Medicine [phone]            See the Encounter Report to view Anticoagulation Flowsheet and Dosing Calendar (Go to Encounters tab in chart review, and find the Anticoagulation Therapy Visit)        Edie De Jesus, VARINDER

## 2017-09-14 NOTE — TELEPHONE ENCOUNTER
Clinic Action Needed:No    Reason for Call: Carolina, care giver for Sonny while sister is out of town (See provider's notes from earlier today) is calling to inquire about the Vitamin K that was ordered at Urgent Care by Dr. Fermin.  Verified it was sent to [facility] in [place].  That pharmacy closed at 7:00 pm, however [facility] in [place] is open until 11:00 pm tonight.  Called in order for Mephyton as written by Dr. Fermin in [facility], pharmacist Kenia accepted verbal order. Notified Kenia that [facility] will fill script.    Routed to: Not routed.    Nel Hunt, RN  [facility]

## 2017-09-14 NOTE — NURSING NOTE
Chief Complaint   Patient presents with     INR Followup       Initial /84  Pulse 67  Temp 96.2  F (35.7  C) (Tympanic)  SpO2 95% Estimated body mass index is 35.04 kg/(m^2) as calculated from the following:    Height as of 1/4/17: 5' (1.524 m).    Weight as of 9/1/17: 179 lb 6.4 oz (81.4 kg).  Medication Reconciliation: complete     GLENROY Reece

## 2017-09-14 NOTE — PROGRESS NOTES
Cc: supratherapeutic INR    Accompanied by caretaker Carolina (ph: [phone]    No new medications  No hematemesis hematochezia or melena  No bleeding  No fevers or chills chest pain   No increased swelling.   No increased shortness of breath    Eating and drinking ok  No new rashes  No epistaxis     No change in diet     Accompanied by another caretaker today, YOKO available and consent from primary caretaker.    Patient was advised to come in to be evaluated    Patient does have a history of right sided heart failure followed by primary care provider and cardiology    Patient feels like he's at his baseline.     Problem list and histories reviewed & adjusted, as indicated.  Additional history: as documented    Problem list, Medication list, Allergies, and Medical/Social/Surgical histories reviewed in EPIC and updated as appropriate.    ROS:  Constitutional, HEENT, cardiovascular, pulmonary, gi and gu systems are negative, except as otherwise noted.    OBJECTIVE:                                                    /84  Pulse 67  Temp 96.2  F (35.7  C) (Tympanic)  SpO2 95%  There is no height or weight on file to calculate BMI.  GENERAL: alert no distress.   EYES: Eyes grossly normal to inspection, PERRL and conjunctivae and sclerae normal  NECK: no adenopathy, no asymmetry, masses, or scars and thyroid normal to palpation  RESP: lungs clear to auscultation - no rales, rhonchi or wheezes  CV: regular rate and rhythm, normal S1 S2, no S3 or S4, no murmur, click or rub,   Bilateral pitting edema  ABDOMEN: soft, nontender, bowel sounds normal and distended but per caretaker at his baseline  MS: no gross musculoskeletal defects noted, no edema  SKIN: no suspicious lesions or rashes no petechiae, no bruising.   NEURO: Normal strength and tone, mentation intact and speech normal  PSYCH: mentation appears normal, affect normal/bright    Diagnostic Test Results:  Results for orders placed or performed in visit on  09/13/17 (from the past 24 hour(s))   Basic metabolic panel   Result Value Ref Range    Sodium 134 133 - 144 mmol/L    Potassium 4.5 3.4 - 5.3 mmol/L    Chloride 100 94 - 109 mmol/L    Carbon Dioxide 27 20 - 32 mmol/L    Anion Gap 7 3 - 14 mmol/L    Glucose 96 70 - 99 mg/dL    Urea Nitrogen 26 7 - 30 mg/dL    Creatinine 1.58 (H) 0.66 - 1.25 mg/dL    GFR Estimate 46 (L) >60 mL/min/1.7m2    GFR Estimate If Black 56 (L) >60 mL/min/1.7m2    Calcium 8.6 8.5 - 10.1 mg/dL   BNP-N terminal pro   Result Value Ref Range    N-Terminal Pro Bnp 2695 (H) 0 - 125 pg/mL   CBC with platelets   Result Value Ref Range    WBC 3.7 (L) 4.0 - 11.0 10e9/L    RBC Count 4.48 4.4 - 5.9 10e12/L    Hemoglobin 13.7 13.3 - 17.7 g/dL    Hematocrit 42.9 40.0 - 53.0 %    MCV 96 78 - 100 fl    MCH 30.6 26.5 - 33.0 pg    MCHC 31.9 31.5 - 36.5 g/dL    RDW 24.0 (H) 10.0 - 15.0 %    Platelet Count 121 (L) 150 - 450 10e9/L   Hepatic panel   Result Value Ref Range    Bilirubin Direct 0.9 (H) 0.0 - 0.2 mg/dL    Bilirubin Total 1.6 (H) 0.2 - 1.3 mg/dL    Albumin 2.2 (L) 3.4 - 5.0 g/dL    Protein Total 7.7 6.8 - 8.8 g/dL    Alkaline Phosphatase 222 (H) 40 - 150 U/L    ALT 16 0 - 70 U/L    AST 27 0 - 45 U/L        ASSESSMENT/PLAN:                                                        ICD-10-CM    1. 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 Right-sided heart failure (H) I50.9    prescribed with oral vitamin K  No active signs or symptoms of bleeding based on exam  Alarm signs or symptoms discussed, if present recommend go to ER   If with any concerns of bleeding go to ER  Will check liver function.   NO WARFARIN until repeat INR recommend follow up in 1-2 days. Dosing depends on repeat INR  Signs or symptoms of worsening heart failure also discussed if concerns go to ER.   Adverse reactions of medications discussed.  Over the counter  medications discussed.   Aware to come back in if with worsening symptoms or if no relief despite treatment plan  Patient voiced understanding and had no further questions.     ADDENDUM: liver function tests have come back. Already seen and addressed by primary care provider. See result note.   Question worsening heart failure as potential cause. Defer to primary care provider. Will forward copy of note to primary care provider who has also been following patient closely.    MD Steffany Ren MD  [facility]

## 2017-09-14 NOTE — TELEPHONE ENCOUNTER
I was called tonight regarding a critical INR value on this patient, 9.14.  Carolina, who is taking care of this patient now in respite care, while his usual provider, (his sister) Sarah is out of the country, was available to speak regarding this patient ([phone]). She reported that she has not noted any signs of bleeding or lightheadedness in this patient. No known recent trauma. No known recent infections.   Given the patient's complicated history and the limited history which is available, I advised her to bring Sonny in to an urgent care or emergency room, to be physically assessed-the provider in UC or the ED can then decide regarding the vitamin K regimen and next INR check, and will remind the patient to hold the warfarin, etc.  Carolina voiced understanding and will be taking the patient in to UC or ED now.    Thank you,  Genna Balderas MD

## 2017-09-14 NOTE — TELEPHONE ENCOUNTER
I faxed the pts lab results done on 9/13/17 to [facility] @ [phone].  Kelsie Alvarez,

## 2017-09-14 NOTE — TELEPHONE ENCOUNTER
This message may be delivered on 9.14.17:    1) Please advise Carolina (ph: [phone]), who is the patient's caregiver (as his usual caregiver, his sister Sarah is out of the country), that the patient's liver function tests are slightly abnormal (and that this could be related to the patient's high INR) and that he should have a non-fasting blood test this Friday to recheck them.      2) Please also fax a copy of the complete labs from today to the [facility]I know that they wanted to see the proBNP and the BMP.    Thank you,  Genna Balderas MD

## 2017-09-15 NOTE — PROGRESS NOTES
ANTICOAGULATION FOLLOW-UP CLINIC VISIT    Patient Name:  Sonny Vivar  Date:  9/15/2017  Contact Type:  Face to Face    SUBJECTIVE:     Patient Findings     Comments Here with care attendant. Coumadin held wed and thurs after supra INR of 9.1. Was given vitamin K 5 mg on Wednesday evening. No other changes. Eating ok. Pending labs today. INR 2.0. Dose adjusted and recheck in clinic Monday. Call if any questions or concerns.            OBJECTIVE    INR Protime   Date Value Ref Range Status   09/15/2017 2.0 (A) 0.86 - 1.14 Final       ASSESSMENT / PLAN  INR assessment THER    Recheck INR In: 3 DAYS    INR Location Clinic      Anticoagulation Summary as of 9/15/2017     INR goal 2.0-3.0   Today's INR 2.0   Maintenance plan 3 mg (3 mg x 1) on Sun, Thu; 1.5 mg (3 mg x 0.5) all other days   Full instructions 9/16: Hold; 9/17: 1.5 mg; Otherwise 3 mg on Sun, Thu; 1.5 mg all other days   Weekly total 13.5 mg   Plan last modified Beverley Contreras RN (8/21/2017)   Next INR check 9/18/2017   Target end date     Indications   Pulmonary embolism and infarction (H) [I26.99]  Long-term (current) use of anticoagulants [Z79.01] [Z79.01]         Anticoagulation Episode Summary     INR check location     Preferred lab     Send INR reminders to [facility]    Comments       Anticoagulation Care Providers     Provider Role Specialty Phone number    Wes Camara MD Referring Internal Medicine [phone]    Genna Balderas MD Responsible Internal Medicine [phone]            See the Encounter Report to view Anticoagulation Flowsheet and Dosing Calendar (Go to Encounters tab in chart review, and find the Anticoagulation Therapy Visit)    Dosage adjustment made based on physician directed care plan.    Beverley Contreras RN

## 2017-09-15 NOTE — MR AVS SNAPSHOT
Sonny Vivar   9/15/2017 11:45 AM   Anticoagulation Therapy Visit    Description:  51 year old male   Provider:  AN ANTI COAG   Department:  An Nurse           INR as of 9/15/2017     Today's INR 2.0      Anticoagulation Summary as of 9/15/2017     INR goal 2.0-3.0   Today's INR 2.0   Full instructions 9/16: Hold; 9/17: 1.5 mg; Otherwise 3 mg on Sun, Thu; 1.5 mg all other days   Next INR check 9/18/2017    Indications   Pulmonary embolism and infarction (H) [I26.99]  Long-term (current) use of anticoagulants [Z79.01] [Z79.01]         Your next Anticoagulation Clinic appointment(s)     Sep 20, 2017  1:45 PM CDT   [facility]    [address] [facility] [address] [phone]            Sep 27, 2017  2:15 PM CDT   [facility]agulation [facility] AN ANTI [facility]AG   [facility]    [address] [facility] [address] [phone]              Contact Numbers     [facility]  Please call  [phone] to cancel and/or reschedule your appointment, or with any problems or questions regarding your therapy.        September 2017 Details    Sun Mon Tue Wed Thu Fri Sat          1               2                 3               4               5               6               7               8               9                 10               11               12               13               14               15               16                 17               18      1.5 mg   See details      19      1.5 mg         20            21               22               23                 24               25               26               27               28               29               30                Date Details   09/18 This INR check       Date of next INR:  9/20/2017         How to take your warfarin dose     To take:  1.5 mg Take 0.5 of a 3 mg tablet.

## 2017-09-18 NOTE — PROGRESS NOTES
ANTICOAGULATION FOLLOW-UP CLINIC VISIT    Patient Name:  Sonny Vivar  Date:  9/18/2017  Contact Type:  Face to Face    SUBJECTIVE:     Patient Findings     Positives No Problem Findings           OBJECTIVE    INR Protime   Date Value Ref Range Status   09/18/2017 1.8 (A) 0.86 - 1.14 Final       ASSESSMENT / PLAN  INR assessment SUB    Recheck INR In: 2 DAYS    INR Location Clinic      Anticoagulation Summary as of 9/18/2017     INR goal 2.0-3.0   Today's INR 1.8!   Maintenance plan 3 mg (3 mg x 1) on Sun, Thu; 1.5 mg (3 mg x 0.5) all other days   Full instructions 3 mg on Sun, Thu; 1.5 mg all other days   Weekly total 13.5 mg   Plan last modified Beverley Contreras RN (8/21/2017)   Next INR check 9/20/2017   Target end date     Indications   Pulmonary embolism and infarction (H) [I26.99]  Long-term (current) use of anticoagulants [Z79.01] [Z79.01]         Anticoagulation Episode Summary     INR check location     Preferred lab     Send INR reminders to [facility]    Comments       Anticoagulation Care Providers     Provider Role Specialty Phone number    Wes Camara MD Referring Internal Medicine [phone]    Genna Balderas MD Responsible Internal Medicine [phone]            See the Encounter Report to view Anticoagulation Flowsheet and Dosing Calendar (Go to Encounters tab in chart review, and find the Anticoagulation Therapy Visit)        Edie De Jesus, RN

## 2017-09-18 NOTE — PROGRESS NOTES
Dear Sonny,     Your test results are attached.    Your liver numbers are overall stable.  We will plan on completing some (blood and urine) tests when your sister gets back, in ~ 3-4 weeks, to help us figure out why your liver numbers are a bit off.  Please follow the INR nurse's instructions regarding warfarin dose adjustments.    Please notify me via [employer] or contact the clinic at [phone] if you have any questions.    Genna Balderas MD

## 2017-09-18 NOTE — MR AVS SNAPSHOT
Sonny Vivar   9/18/2017 11:45 AM   Anticoagulation Therapy Visit    Description:  51 year old male   Provider:  AN ANTI COAG   Department:  An Nurse           INR as of 9/18/2017     Today's INR 1.8!      Anticoagulation Summary as of 9/18/2017     INR goal 2.0-3.0   Today's INR 1.8!   Full instructions 3 mg on Sun, Thu; 1.5 mg all other days   Next INR check 9/20/2017    Indications   Pulmonary embolism and infarction (H) [I26.99]  Long-term (current) use of anticoagulants [Z79.01] [Z79.01]         Your next Anticoagulation Clinic appointment(s)     Sep 20, 2017  1:45 PM CDT   Anticoagulation Visit with AN ANTI COAG   [facility]    [address] [address] [phone]            Sep 27, 2017  2:15 PM CDT   Anticoagulation Visit with AN ANTI COAG   [facility]    [address] [facility] [facility] [address] [phone]              Contact Numbers     [facility]  Please call  [phone] to cancel and/or reschedule your appointment, or with any problems or questions regarding your therapy.        September 2017 Details    Sun Mon Tue Wed Thu Fri Sat          1               2                 3               4               5               6               7               8               9                 10               11               12               13               14               15               16                 17               18      1.5 mg   See details      19      1.5 mg         20            21               22               23                 24               25               26               27               28               29               30                Date Details   09/18 This INR check       Date of next INR:  9/20/2017         How to take your warfarin dose     To take:  1.5 mg Take 0.5 of a 3 mg tablet.

## 2017-09-20 NOTE — MR AVS SNAPSHOT
Sonny Vivar   9/20/2017 1:45 PM   Anticoagulation Therapy Visit    Description:  51 year old male   Provider:  AN ANTI COAG   Department:  An Nurse           INR as of 9/20/2017     Today's INR 2.4      Anticoagulation Summary as of 9/20/2017     INR goal 2.0-3.0   Today's INR 2.4   Full instructions 9/21: Hold; 9/24: 1.5 mg; Otherwise 3 mg on Sun, Thu; 1.5 mg all other days   Next INR check 9/25/2017    Indications   Pulmonary embolism and infarction (H) [I26.99]  Long-term (current) use of anticoagulants [Z79.01] [Z79.01]         Your next Anticoagulation Clinic appointment(s)     Sep 25, 2017  3:00 PM CDT   Anticoagulation Visit with AN ANTI COAG   [facility]    [address] [address] [phone]            Sep 27, 2017  2:15 PM CDT   Anticoagulation Visit with AN ANTI COAG   [facility]    [address] [address] [phone]              Contact Numbers     [facility]  Please call  [phone] to cancel and/or reschedule your appointment, or with any problems or questions regarding your therapy.        September 2017 Details    Sun Mon Tue Wed Thu Fri Sat          1               2                 3               4               5               6               7               8               9                 10               11               12               13               14               15               16                 17               18               19               20      1.5 mg   See details      21      Hold         22      1.5 mg         23      1.5 mg           24      1.5 mg         25            26               27               28               29               30                Date Details   09/20 This INR check       Date of next INR:  9/25/2017         How to take your warfarin dose     To take:  1.5 mg Take 0.5 of a 3 mg tablet.    Hold Do not  take your warfarin dose. See the Details table to the right for additional instructions.

## 2017-09-20 NOTE — PROGRESS NOTES
ANTICOAGULATION FOLLOW-UP CLINIC VISIT    Patient Name:  Sonny Vivar  Date:  9/20/2017  Contact Type:  Face to Face    SUBJECTIVE:     Patient Findings     Comments Here with respite care taker Wily Underwood. Her # is [phone] Therapeutic. INR 2.5 and trending up. Still unsure what caused supra result. Dose adjusted and recheck in 5 days.            OBJECTIVE    INR Protime   Date Value Ref Range Status   09/20/2017 2.4 (A) 0.86 - 1.14 Final       ASSESSMENT / PLAN  INR assessment THER    Recheck INR In: 5 DAYS    INR Location Clinic      Anticoagulation Summary as of 9/20/2017     INR goal 2.0-3.0   Today's INR 2.4   Maintenance plan 3 mg (3 mg x 1) on Sun, Thu; 1.5 mg (3 mg x 0.5) all other days   Full instructions 9/21: Hold; 9/24: 1.5 mg; Otherwise 3 mg on Sun, Thu; 1.5 mg all other days   Weekly total 13.5 mg   Plan last modified Beverley Contreras RN (8/21/2017)   Next INR check 9/25/2017   Target end date     Indications   Pulmonary embolism and infarction (H) [I26.99]  Long-term (current) use of anticoagulants [Z79.01] [Z79.01]         Anticoagulation Episode Summary     INR check location     Preferred lab     Send INR reminders to [facility]    Comments       Anticoagulation Care Providers     Provider Role Specialty Phone number    Wes Camara MD Referring Internal Medicine [phone]    Genna Balderas MD Responsible Internal Medicine [phone]            See the Encounter Report to view Anticoagulation Flowsheet and Dosing Calendar (Go to Encounters tab in chart review, and find the Anticoagulation Therapy Visit)    Dosage adjustment made based on physician directed care plan.    Beverley Contreras RN

## 2017-09-27 NOTE — MR AVS SNAPSHOT
Sonny Vivar   9/27/2017 2:15 PM   Anticoagulation Therapy Visit    Description:  51 year old male   Provider:  AN ANTI COAG   Department:  An Nurse           INR as of 9/27/2017     Today's INR 2.5      Anticoagulation Summary as of 9/27/2017     INR goal 2.0-3.0   Today's INR 2.5   Full instructions 9/28: Hold; 9/30: Hold; 10/1: 1.5 mg; Otherwise 3 mg on Sun, Thu; 1.5 mg all other days   Next INR check 10/3/2017    Indications   Pulmonary embolism and infarction (H) [I26.99]  Long-term (current) use of anticoagulants [Z79.01] [Z79.01]         Your next Anticoagulation Clinic appointment(s)     Oct 03, 2017 11:45 AM CDT   [facility]AG   [facility]    [address] [facility] [address] [phone]              Contact Numbers     [facility]  Please call  [phone] to cancel and/or reschedule your appointment, or with any problems or questions regarding your therapy.        September 2017 Details    Sun Mon Tue Wed Thu Fri Sat          1               2                 3               4               5               6               7               8               9                 10               11               12               13               14               15               16                 17               18               19               20               21               22               23                 24               25               26               27      1.5 mg   See details      28      Hold         29      1.5 mg         30      Hold          Date Details   09/27 This INR check               How to take your warfarin dose     To take:  1.5 mg Take 0.5 of a 3 mg tablet.    Hold Do not take your warfarin dose. See the Details table to the right for additional instructions.                October 2017 Details    Sun Mon Tue Wed Thu Fri Sat     1      1.5 mg         2      1.5 mg         3            4                5               6               7                 8               9               10               11               12               13               14                 15               16               17               18               19               20               21                 22               23               24               25               26               27               28                 29               30               31                    Date Details   No additional details    Date of next INR:  10/3/2017         How to take your warfarin dose     To take:  1.5 mg Take 0.5 of a 3 mg tablet.

## 2017-09-27 NOTE — PROGRESS NOTES
ANTICOAGULATION FOLLOW-UP CLINIC VISIT    Patient Name:  Sonny Vivar  Date:  9/27/2017  Contact Type:  Face to Face    SUBJECTIVE:     Patient Findings     Comments Here with sister today. Patient was in respite care until Monday. Patient was to have INR done 2 days ago and that apt was cancelled. Missed warfarin dose Monday and Tuesday. INR 2.5 today. Will continue to dose cautiously. Sister will schedule follow up with pcp in next 1-2 wks. No other changes noted. Recheck in 6 days when in clinic for other apt.            OBJECTIVE    INR Protime   Date Value Ref Range Status   09/27/2017 2.5 (A) 0.86 - 1.14 Final       ASSESSMENT / PLAN  INR assessment THER    Recheck INR In: 6 DAYS    INR Location Clinic      Anticoagulation Summary as of 9/27/2017     INR goal 2.0-3.0   Today's INR 2.5   Maintenance plan 3 mg (3 mg x 1) on Sun, Thu; 1.5 mg (3 mg x 0.5) all other days   Full instructions 9/28: Hold; 9/30: Hold; 10/1: 1.5 mg; Otherwise 3 mg on Sun, Thu; 1.5 mg all other days   Weekly total 13.5 mg   Plan last modified Beverley Contreras RN (8/21/2017)   Next INR check 10/3/2017   Target end date     Indications   Pulmonary embolism and infarction (H) [I26.99]  Long-term (current) use of anticoagulants [Z79.01] [Z79.01]         Anticoagulation Episode Summary     INR check location     Preferred lab     Send INR reminders to [facility]    Comments       Anticoagulation Care Providers     Provider Role Specialty Phone number    Wes Camara MD Referring Internal Medicine [phone]    Genna Balderas MD Responsible Internal Medicine [phone]            See the Encounter Report to view Anticoagulation Flowsheet and Dosing Calendar (Go to Encounters tab in chart review, and find the Anticoagulation Therapy Visit)    Dosage adjustment made based on physician directed care plan.    Beverley Contreras RN

## 2017-09-27 NOTE — TELEPHONE ENCOUNTER
Refilled med x 1 month  No pending appointment.     , please send letter for patient to follow up per last AVS with Dr. Genna Balderas     Patient Instructions   Please see your Cardiologist, Dr Coleman after your sister's trip, at the end of September.   Keep your appointment with the Cardiology Nurse Practitioner next week.   We can see you 2-3 weeks after you see Dr Coleman.  Continue to follow-up with our INR clinic.     Thank you, Ally García, BIENVENIDON RN

## 2017-10-02 NOTE — PROGRESS NOTES
SUBJECTIVE:   Sonny Vivar is a 51 year old male who presents to clinic today for the following health issues:    Patient is accompanied by his sister and his nephew.    Concern - weight gain started  8 pounds in 3 weeks   Onset: ?    Description:   Weight gain in respite    Intensity: severe    Progression of Symptoms:  worsening    Accompanying Signs & Symptoms:  Sob     Previous history of similar problem:   yes    Precipitating factors:   Worsened by: walking ,dressing    Alleviating factors:  Improved by: air on     Therapies Tried and outcome: o2    Also has been having exertional shortness of breath for the past week or so.  Runny nose.  No noted cough.  Leg edema stable  A net weight gain, as noted above, and family notes possible increased abd girth and scrotal edema in the past week or so.  No reported f/c  He has had decreased PO intake today.  No recent falls or near falls.  He got a neb treatment the night before the last.          Problem list and histories reviewed & adjusted, as indicated.  Additional history: as documented    Patient Active Problem List   Diagnosis     Mental handicap     Pulmonary hypertension     Acquired bronchiectasis (H)     Hypoventilation associated with obesity (H)     Post-phlebitic syndrome     Hyperlipidemia LDL goal <130     Dry eye syndrome     Punctate keratitis due to dry eyes and O2 use     Posterior subcapsular polar senile cataract     Macular scar     Obesity, morbid (more than 100 lbs over ideal weight or BMI > 40) (H)     Long-term (current) use of anticoagulants, INR goal 2.0-3.0     On supplemental oxygen therapy     Trisomy 21 syndrome     Abnormal CT scan of lung     Recurrent pulmonary embolism (H)     Anisometropia     Encounter for counseling     Primary hypercoagulable state (H)     Hypertension, benign     Pulmonary embolism and infarction (H)     Venous (peripheral) insufficiency     Obstructive sleep apnea     Encounter for counseling      Dependence on supplemental oxygen     Bronchiectasis (H)     Long-term (current) use of anticoagulants [Z79.01]     Idiopathic gout, unspecified chronicity, unspecified site     Gastroesophageal reflux disease, esophagitis presence not specified     Chronic atrial fibrillation (H)     Bilateral lower extremity edema     Deep vein thrombosis (DVT) of other vein of lower extremity     Acute on chronic systolic heart failure (H)     Advance care planning     Health Care Home     BP check     Acute on chronic respiratory failure with hypoxia and hypercapnia (H)     Right-sided heart failure     Venous stasis ulcers, unspecified laterality     Past Surgical History:   Procedure Laterality Date     SURGICAL HISTORY OF -       thoractomy for lung infection     THORACIC SURGERY  Feb 1995    Done at [facility]       Social History   Substance Use Topics     Smoking status: Never Smoker     Smokeless tobacco: Never Used      Comment: Lives in smoke free household     Alcohol use No     Family History   Problem Relation Age of Onset     HEART DISEASE Mother      Unknown/Adopted No family hx of      CANCER No family hx of      DIABETES No family hx of      Hypertension No family hx of      CEREBROVASCULAR DISEASE No family hx of      Thyroid Disease No family hx of      Glaucoma No family hx of      Macular Degeneration No family hx of          Current Outpatient Prescriptions   Medication Sig Dispense Refill     Potassium Chloride ER 20 MEQ TBCR TAKE 1 TABLET BY MOUTH TWICE DAILY. 60 tablet 0     order for DME Tubing for nebulizer 1 Units 0     warfarin (COUMADIN) 3 MG tablet Take 3 mg valentine,tu,th and 1.5 mg m,w,f,sa 150 tablet 1     order for DME Equipment being ordered:1 pair (2 units) of knee high compression stockings, 10-15mm Hg, to be worn in the daytime only on both legs, closed-toe. 2 Units 0     bumetanide (BUMEX) 2 MG tablet Take 1 tablet (2 mg) by mouth 2 times daily Take 1.5 tablets (3 mg) by mouth 2 times daily  (Patient taking differently: Take 2 mg by mouth 2 times daily AS of 9/1/2018 patient takes 1 tablet twice daily.     AS of 8/9/2017 patient takes 1.5 tablets 2 times a day.    Take 1.5 tablets (3 mg) by mouth 2 times daily) 180 tablet 1     acetaZOLAMIDE (DIAMOX) 250 MG tablet Take 1 tablet (250 mg) by mouth daily 90 tablet 0     sildenafil (REVATIO/VIAGRA) 20 MG tablet Take 1 tablet (20 mg) by mouth 3 times daily 2/22/2017 patient takes one tablet 3 times daily 180 tablet 1     omeprazole (PRILOSEC) 40 MG capsule TAKE ONE CAPSULE BY MOUTH DAILY. TAKE 30-60 MINUTES BEFORE A MEAL 90 capsule 3     amoxicillin (AMOXIL) 500 MG capsule TAKE ONE CAPSULE BY MOUTH THREE TIMES DAILY. 2 WEEKS ON, THEN 2 WEEKS OFF 42 capsule 1     simvastatin (ZOCOR) 20 MG tablet TAKE 1 TABLET(20 MG) BY MOUTH 3 TIMES A WEEK 39 tablet 3     allopurinol (ZYLOPRIM) 300 MG tablet Take 1 tablet (300 mg) by mouth daily 7 tablet 0     BETA BLOCKER NOT PRESCRIBED, INTENTIONAL, Beta Blocker not prescribed intentionally due to Evidence of fluid overload or volume depletion  0     ACE/ARB NOT PRESCRIBED, INTENTIONAL, Please choose reason not prescribed, below       order for DME Equipment being ordered:   Home Oxygen (same home and transportable oxygen tanks and equipment which the patient currently has, as of: February 26, 2017).  3 liters per minute of oxygen at rest and on ambulaton via nasal canula.  Continuous.  Need for 99 months.    Please fax this order, along with Dr Balderas' last OV note from 2.22.17, to: [facility]: fax: [phone]. 1 Device 0     acetaminophen (TYLENOL) 325 MG tablet Take 325-650 mg by mouth every 6 hours as needed for mild pain       multivitamin, therapeutic with minerals (MULTI-VITAMIN) TABS Take 2 tablets by mouth daily        ipratropium - albuterol 0.5 mg/2.5 mg/3 mL (DUONEB) 0.5-2.5 (3) MG/3ML nebulization INHALE 1 VIAL BY NEBULIZATION EVERY 6 HOURS AS NEEDED. USE FOR INCREASED COUGH AND/OR  CONGESTION 360 mL 1     ORDER FOR DME Equipment being ordered: Tubing for nebulizer and accessories. 1 Units 1     ORDER FOR DME Dispense one handheld pulse oximeter for home use.  Check oxygen saturation daily.  Notify primary MD clinic if oxygen regularly <90% or <88% at any time, and increase supplemental oyxgen. 1 Device 0     Respiratory Therapy Supplies (NEBULIZER/ADULT MASK) KIT 1 Units 4 times daily. 1 kit 0     ORDER FOR DME 3 L continuous.           Reviewed and updated as needed this visit by clinical staff       Reviewed and updated as needed this visit by Provider           ==============================================================  ROS:  Constitutional, HEENT, cardiovascular, pulmonary, GI, , musculoskeletal, neuro, skin, endocrine and psych systems are negative, except as otherwise noted.         OBJECTIVE:                                                    BP (!) 73/50  Pulse 79  Temp 97  F (36.1  C) (Oral)  SpO2 (!) 88%  There is no height or weight on file to calculate BMI.         Vitals:    10/02/17 1635 10/02/17 1648   BP: (!) 76/53 (!) 73/50   Pulse: 79    Temp: 97  F (36.1  C)    TempSrc: Oral    SpO2: (!) 88%        GENERAL APPEARANCE: healthy, alert and in no distress  EYES: Eyes grossly normal to inspection, and conjunctivae and sclerae normal  HENT: head normocephalic and atraumatic and mouth without ulcers or lesions, oropharynx clear and oral mucous membranes moist  NECK: no noticeable adenopathy, no asymmetry, masses, or scars   RESP:   Diffuse, faint crackles throughout the lungs  No wheezes  CV: regular rate and rhythm, normal S1 S2, no S3 or S4, no murmur, click or rub, +1-+2 peripheral edema and peripheral pulses strong  ABDOMEN: soft, nontender, no hepatosplenomegaly, no masses and bowel sounds normal  MS: no musculoskeletal defects are noted and gait is age appropriate without ataxia  SKIN: no suspicious lesions or rashes  NEURO: mentation intact and speech normal  PSYCH:  mentation appears normal and affect normal/bright.      ASSESSMENT/PLAN:                                                        ICD-10-CM    1. Acute on chronic diastolic congestive heart failure (H) I50.33    2. Hypotension, unspecified hypotension type I95.9      ASSESSMENT: as noted-due to the intensive management and monitoring which the patient requires now, he was referred to the ED.  He may need to be first gently fluid resuscitated before he is diuresed..also,will need to cover for pneumonia/bronchitis in the ED...  PLAN:  Patient Instructions   Patient with PMH as noted:   PMH as noted, including recurrent DVT/PE with severe Pulm hypertension, and chronic A fib on warfarin, on Oxygen via a NC, 3L/min at rest and 5-6 L/min when ambulatory, BEAR now on nightly BiPAP, obesity-hypoventilation syndrome-followed by [facility], developmental delay,     Now presents with several days of worsening exertional shortness of breath, and noted to be hypotensive to 70s/50s, with increasing oxygen requirements. Noted to have new crackles in his lungs. Was advised to go to the ED, for evaluation for developing pneumonia, with hypovolemia vs (less likely) sepsis; may need careful diuresis when blood pressures normalize.   The patient's usual blood pressure is 90/60.                 Genna Balderas MD  [facility]

## 2017-10-02 NOTE — PATIENT INSTRUCTIONS
Patient with PMH as noted:   PMH as noted, including recurrent DVT/PE with severe Pulm hypertension, and chronic A fib on warfarin, on Oxygen via a NC, 3L/min at rest and 5-6 L/min when ambulatory, BEAR now on nightly BiPAP, obesity-hypoventilation syndrome-followed by [facility], developmental delay,     Now presents with several days of worsening exertional shortness of breath, and noted to be hypotensive to 70s/50s, with increasing oxygen requirements. Noted to have new crackles in his lungs. Was advised to go to the ED, for evaluation for developing pneumonia, with hypovolemia vs (less likely) sepsis; may need careful diuresis when blood pressures normalize.   The patient's usual blood pressure is 90/60.

## 2017-10-02 NOTE — MR AVS SNAPSHOT
After Visit Summary   10/2/2017    Sonny Vivar    MRN: [id number]           Patient Information     Date Of Birth          1965        Visit Information        Provider Department      10/2/2017 4:10 PM Genna Balderas MD [facility]        Care Instructions    Patient with PMH as noted:   PMH as noted, including recurrent DVT/PE with severe Pulm hypertension, and chronic A fib on warfarin, on Oxygen via a NC, 3L/min at rest and 5-6 L/min when ambulatory, BEAR now on nightly BiPAP, obesity-hypoventilation syndrome-followed by [facility], developmental delay,     Now presents with several days of worsening exertional shortness of breath, and noted to be hypotensive to 70s/50s, with increasing oxygen requirements. Noted to have new crackles in his lungs. Was advised to go to the ED, for evaluation for developing pneumonia, with hypovolemia vs (less likely) sepsis; may need careful diuresis when blood pressures normalize.   The patient's usual blood pressure is 90/60.           Follow-ups after your visit        Your next 10 appointments already scheduled     Oct 03, 2017 11:00 AM CDT   [facility]    [address] [address] [phone]            Oct 03, 2017 11:45 AM CDT   Anticoagulation [facility] with AN ANTI COAG   [facility]    [address] [address] [phone]              Who to contact     If you have questions or need follow up information about today's clinic visit or your schedule please contact [facility] directly at [phone].  Normal or non-critical lab and imaging results will be communicated to you by MyChart, letter or phone within 4 business days after the clinic has received the results. If you do not hear from us within 7 days, please contact the clinic through  [employer] or phone. If you have a critical or abnormal lab result, we will notify you by phone as soon as possible.  Submit refill requests through [employer] or call your pharmacy and they will forward the refill request to us. Please allow 3 business days for your refill to be completed.          Additional Information About Your Visit        [employer] Information     [employer] gives you secure access to your electronic health record. If you see a primary care provider, you can also send messages to your care team and make appointments. If you have questions, please call your primary care clinic.  If you do not have a primary care provider, please call [phone] and they will assist you.        Care EveryWhere ID     This is your Care EveryWhere ID. This could be used by other organizations to access your [facility] medical records  [id number]        Your Vitals Were     Pulse Temperature Pulse Oximetry             79 97  F (36.1  C) (Oral) 88%          Blood Pressure from Last 3 Encounters:   10/02/17 (!) 73/50   09/13/17 138/84   09/01/17 95/68    Weight from Last 3 Encounters:   09/01/17 179 lb 6.4 oz (81.4 kg)   08/16/17 191 lb 9.6 oz (86.9 kg)   08/09/17 200 lb 12.8 oz (91.1 kg)              Today, you had the following     No orders found for display         Today's Medication Changes          These changes are accurate as of: 10/2/17  5:09 PM.  If you have any questions, ask your nurse or doctor.               These medicines have changed or have updated prescriptions.        Dose/Directions    bumetanide 2 MG tablet   Commonly known as:  BUMEX   This may have changed:  additional instructions   Used for:  Pulmonary hypertension        Dose:  2 mg   Take 1 tablet (2 mg) by mouth 2 times daily Take 1.5 tablets (3 mg) by mouth 2 times daily   Quantity:  180 tablet   Refills:  1                Primary Care Provider Office Phone # Fax #    Genna Balderas [phone] [id number]  [facility] [address]        Equal Access to Services     [facility] FERMIN : Hadii pablo xiong melissa Sodiane, waaxda luqadaha, qaybta kaqasimradha barrientosamadeolisa sam. So [facility] [phone].    ATENCIÓN: Si habla español, tiene a valentine disposición servicios gratuitos de asistencia lingüística. [facility] [phone].    We comply with applicable federal civil rights laws and [place] laws. We do not discriminate on the basis of race, color, national origin, age, disability, sex, sexual orientation, or gender identity.            Thank you!     Thank you for choosing [facility]  for your care. Our goal is always to provide you with excellent care. Hearing back from our patients is one way we can continue to improve our services. Please take a few minutes to complete the written survey that you may receive in the mail after your visit with us. Thank you!             Your Updated Medication List - Protect others around you: Learn how to safely use, store and throw away your medicines at www.disposemymeds.org.          This list is accurate as of: 10/2/17  5:09 PM.  Always use your most recent med list.                   Brand Name Dispense Instructions for use Diagnosis    ACE/ARB NOT PRESCRIBED (INTENTIONAL)      Please choose reason not prescribed, below    Acute on chronic systolic heart failure (H)       acetaminophen 325 MG tablet    TYLENOL     Take 325-650 mg by mouth every 6 hours as needed for mild pain        acetaZOLAMIDE 250 MG tablet    DIAMOX    90 tablet    Take 1 tablet (250 mg) by mouth daily    Pulmonary hypertension       allopurinol 300 MG tablet    ZYLOPRIM    7 tablet    Take 1 tablet (300 mg) by mouth daily    Idiopathic gout, unspecified chronicity, unspecified site       amoxicillin 500 MG capsule    AMOXIL    42 capsule    TAKE ONE CAPSULE BY MOUTH THREE TIMES DAILY. 2 WEEKS ON, THEN 2 WEEKS OFF    Acquired bronchiectasis (H), Hypoventilation associated  with obesity (H)       BETA BLOCKER NOT PRESCRIBED (INTENTIONAL)      Beta Blocker not prescribed intentionally due to Evidence of fluid overload or volume depletion    Hypertension, benign       bumetanide 2 MG tablet    BUMEX    180 tablet    Take 1 tablet (2 mg) by mouth 2 times daily Take 1.5 tablets (3 mg) by mouth 2 times daily    Pulmonary hypertension       ipratropium - albuterol 0.5 mg/2.5 mg/3 mL 0.5-2.5 (3) MG/3ML neb solution    DUONEB    360 mL    INHALE 1 VIAL BY NEBULIZATION EVERY 6 HOURS AS NEEDED. USE FOR INCREASED COUGH AND/OR CONGESTION    Acquired bronchiectasis (H), Pulmonary hypertension       Multi-vitamin Tabs tablet      Take 2 tablets by mouth daily        nebulizer/adult mask Kit     1 kit    1 Units 4 times daily.    On supplemental oxygen therapy, Recurrent pulmonary embolism (H), Pulmonary hypertension       omeprazole 40 MG capsule    priLOSEC    90 capsule    TAKE ONE CAPSULE BY MOUTH DAILY. TAKE 30-60 MINUTES BEFORE A MEAL    Gastroesophageal reflux disease, esophagitis presence not specified       * order for DME     1 Device    Dispense one handheld pulse oximeter for home use.  Check oxygen saturation daily.  Notify primary MD clinic if oxygen regularly <90% or <88% at any time, and increase supplemental oyxgen.    Recurrent pulmonary embolism (H), Bronchiectasis (H), Pulmonary hypertension       * order for DME     1 Units    Equipment being ordered: Tubing for nebulizer and accessories.    Pulmonary hypertension, On supplemental oxygen therapy, Hypoventilation associated with obesity (H), Acquired bronchiectasis (H)       * order for DME      3 L continuous.    Need for prophylactic vaccination with tetanus-diphtheria (TD), Thiazide diuretics causing adverse effect in therapeutic use       * order for DME     1 Device    Equipment being ordered:  Home Oxygen (same home and transportable oxygen tanks and equipment which the patient currently has, as of: February 26, 2017). 3  liters per minute of oxygen at rest and on ambulaton via nasal canula. Continuous. Need for 99 months.  Please fax this order, along with Dr Balderas' last OV note from 2.22.17, to: [facility]: fax: [phone].    Acute on chronic respiratory failure with hypoxia and hypercapnia (H)       * order for DME     2 Units    Equipment being ordered:1 pair (2 units) of knee high compression stockings, 10-15mm Hg, to be worn in the daytime only on both legs, closed-toe.    Lymphedema       * order for DME     1 Units    Tubing for nebulizer    Acquired bronchiectasis (H)       Potassium Chloride ER 20 MEQ Tbcr     60 tablet    TAKE 1 TABLET BY MOUTH TWICE DAILY.    Pulmonary hypertension       sildenafil 20 MG tablet    REVATIO    180 tablet    Take 1 tablet (20 mg) by mouth 3 times daily 2/22/2017 patient takes one tablet 3 times daily    Pulmonary hypertension       simvastatin 20 MG tablet    ZOCOR    39 tablet    TAKE 1 TABLET(20 MG) BY MOUTH 3 TIMES A WEEK    Hyperlipidemia LDL goal <130       warfarin 3 MG tablet    COUMADIN    150 tablet    Take 3 mg su,tu,th and 1.5 mg m,w,f,sa    Long term (current) use of anticoagulants, Pulmonary embolism and infarction (H)       * Notice:  This list has 6 medication(s) that are the same as other medications prescribed for you. Read the directions carefully, and ask your doctor or other care provider to review them with you.

## 2017-10-02 NOTE — NURSING NOTE
Chief Complaint   Patient presents with     Weight Problem     gain       Initial BP (!) 76/53  Pulse 79  Temp 97  F (36.1  C) (Oral)  SpO2 (!) 88% Estimated body mass index is 35.04 kg/(m^2) as calculated from the following:    Height as of 1/4/17: 5' (1.524 m).    Weight as of 9/1/17: 179 lb 6.4 oz (81.4 kg).  Medication Reconciliation: complete

## 2017-10-10 ENCOUNTER — MYC MEDICAL ADVICE (OUTPATIENT)
Dept: INTERNAL MEDICINE | Facility: CLINIC | Age: 52
End: 2017-10-10

## 2017-10-10 NOTE — LETTER
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              Thank you,            Dr. Genna Balderas

## 2017-10-11 ENCOUNTER — ANTICOAGULATION THERAPY VISIT (OUTPATIENT)
Dept: INTERNAL MEDICINE | Facility: CLINIC | Age: 52
End: 2017-10-11

## 2017-10-11 DIAGNOSIS — I26.99 PULMONARY EMBOLISM AND INFARCTION (H): ICD-10-CM

## 2017-10-11 DIAGNOSIS — Z79.01 LONG-TERM (CURRENT) USE OF ANTICOAGULANTS: ICD-10-CM

## 2017-10-11 NOTE — TELEPHONE ENCOUNTER
I called and spoke to Sarah and let her know that the letter is ready for  at the .  Kelsie Alvarez,

## 2017-10-11 NOTE — TELEPHONE ENCOUNTER
I'll put the letter in the TC basket-please see my Mychart reply to Sarah.    Thank you,  Genna Balderas MD

## 2017-11-09 ENCOUNTER — TELEPHONE (OUTPATIENT)
Dept: INTERNAL MEDICINE | Facility: CLINIC | Age: 52
End: 2017-11-09

## 2017-11-09 NOTE — LETTER
[facility]  [address] [facility] [address]  Phone: [phone]  Fax: [phone]    11/09/17    Sonny Vivar  [address] [facility] [address]      To whom it may concern:     Our records indicate that you have not scheduled for a(n)colonoscopy which was recommended by your health care team. Monitoring and managing your preventative and chronic health conditions are very important to us.     If you have received your health care elsewhere, please provide us with that information so it can be documented in your chart.    Please call [phone] or message us through your [employer] account to schedule an appointment or provide information for your chart.     I look forward to seeing you and working with you on your health care needs.       *If you have already scheduled an appointment, please disregard this reminder    Sincerely,      Genna Balderas MD

## 2017-11-09 NOTE — TELEPHONE ENCOUNTER
Panel Management Review      Patient has the following on his problem list: None      Composite cancer screening  Chart review shows that this patient is due/due soon for the following Colonoscopy  Summary:    Patient is due/failing the following:   COLONOSCOPY    Action needed:   colon    Type of outreach:    Sent letter.    Questions for provider review:    None                                                                                                                                    AQUILES Lares   8:30 AM  11/9/2017         Chart routed to self .

## 2021-01-01 NOTE — TELEPHONE ENCOUNTER
Yes, I am aware of the interaction and the patient and his sister have been advised to have INR checked this Friday.  I will therefore forward this message to INR RN Beverley, to help schedule this appointment for the patient. Do not close the encounter until the INR RN has read this.    Thank you,  Genna Balderas MD    negative

## 2021-09-03 NOTE — PROGRESS NOTES
ANTICOAGULATION FOLLOW-UP CLINIC VISIT    Patient Name:  Sonny Vivar  Date:  8/11/2017  Contact Type:  Face to Face    SUBJECTIVE:     Patient Findings     Positives Bleed (ER/Hosp visit) (levaquin started 3 days ago), Unexplained INR or factor level change    Comments INR 7.0 Levaquin started 3 days ago and took 3 rd dose this am. Also started prednisone. This is probable cause of supra result. Was supra at last check on 10 days ago and dose adjustment was made that day. Discussed results with pcp Dr Balderas. Plan hold warfarin fr, sa,valentine and recheck in clinic on Monday. Dr Balderas will also decrease dose of levaquin. Patient's sister Sarah present at visit and given written instructions and also cautioned on increased risks of bleeding and bruising with supra results. They will also increase vit k in diet today and this weekend.            OBJECTIVE    INR Protime   Date Value Ref Range Status   08/11/2017 7.0 (A) 0.86 - 1.14 Final       ASSESSMENT / PLAN  INR assessment SUPRA    Recheck INR In: 3 DAYS    INR Location Clinic      Anticoagulation Summary as of 8/11/2017     INR goal 2.0-3.0   Today's INR 7.0!   Maintenance plan 3 mg (3 mg x 1) on Sun, Tue, Thu; 1.5 mg (3 mg x 0.5) all other days   Full instructions 8/11: Hold; 8/12: Hold; 8/13: Hold; Otherwise 3 mg on Sun, Tue, Thu; 1.5 mg all other days   Weekly total 15 mg   Plan last modified Beverley Contreras RN (7/19/2017)   Next INR check 8/14/2017   Target end date     Indications   Pulmonary embolism and infarction (H) [I26.99]  Long-term (current) use of anticoagulants [Z79.01] [Z79.01]         Anticoagulation Episode Summary     INR check location     Preferred lab     Send INR reminders to AN ANTICOAG [facility]IN[facility]    Comments       Anticoagulation Care Providers     Provider Role Specialty Phone number    Wes Camara MD Referring Internal Medicine [phone]    Genna Balderas MD Responsible Internal Medicine [phone]             See the Encounter Report to view Anticoagulation Flowsheet and Dosing Calendar (Go to Encounters tab in chart review, and find the Anticoagulation Therapy Visit)    Dosage adjustment made based on physician directed care plan.    Beverley Contreras RN                [Negative] : Heme/Lymph

## 2022-02-26 NOTE — TELEPHONE ENCOUNTER
Problem: MOBILITY - ADULT  Goal: Maintain or return to baseline ADL function  Description: INTERVENTIONS:  -  Assess patient's ability to carry out ADLs; assess patient's baseline for ADL function and identify physical deficits which impact ability to perform ADLs (bathing, care of mouth/teeth, toileting, grooming, dressing, etc )  - Assess/evaluate cause of self-care deficits   - Assess range of motion  - Assess patient's mobility; develop plan if impaired  - Assess patient's need for assistive devices and provide as appropriate  - Encourage maximum independence but intervene and supervise when necessary  - Involve family in performance of ADLs  - Assess for home care needs following discharge   - Consider OT consult to assist with ADL evaluation and planning for discharge  - Provide patient education as appropriate  Outcome: Progressing  Goal: Maintains/Returns to pre admission functional level  Description: INTERVENTIONS:  - Perform BMAT or MOVE assessment daily    - Set and communicate daily mobility goal to care team and patient/family/caregiver  - Collaborate with rehabilitation services on mobility goals if consulted  - Perform Range of Motion  times a day  - Reposition patient every  hours    - Dangle patient  times a day  - Stand patient  times a day  - Ambulate patient  times a day  - Out of bed to chair  times a day   - Out of bed for meal times a day  - Out of bed for toileting  - Record patient progress and toleration of activity level   Outcome: Progressing     Problem: Prexisting or High Potential for Compromised Skin Integrity  Goal: Skin integrity is maintained or improved  Description: INTERVENTIONS:  - Identify patients at risk for skin breakdown  - Assess and monitor skin integrity  - Assess and monitor nutrition and hydration status  - Monitor labs   - Assess for incontinence   - Turn and reposition patient  - Assist with mobility/ambulation  - Relieve pressure over bony prominences  - Avoid Respite care PCA with patient now.His primary care giver is on vacation, on a cruise and primary care giver is unreachable by phone.  Patient is staying with respite. nayeli went to grab pills from luggage can cannot find them  PCA called family and they looked at patient apartment. Couldn't  find med's.   Sent 7 days worth of meds as a replacement. Notified Holly.  Ally García, BIENVENIDON RN    friction and shearing  - Provide appropriate hygiene as needed including keeping skin clean and dry  - Evaluate need for skin moisturizer/barrier cream  - Collaborate with interdisciplinary team   - Patient/family teaching  - Consider wound care consult   Outcome: Progressing     Problem: Potential for Falls  Goal: Patient will remain free of falls  Description: INTERVENTIONS:  - Educate patient/family on patient safety including physical limitations  - Instruct patient to call for assistance with activity   - Consult OT/PT to assist with strengthening/mobility   - Keep Call bell within reach  - Keep bed low and locked with side rails adjusted as appropriate  - Keep care items and personal belongings within reach  - Initiate and maintain comfort rounds  - Make Fall Risk Sign visible to staff  - Offer Toileting every  Hours, in advance of need  - Initiate/Maintain alarm  - Obtain necessary fall risk management equipment:   - Apply yellow socks and bracelet for high fall risk patients  - Consider moving patient to room near nurses station  Outcome: Progressing

## 2022-12-08 NOTE — LETTER
To whom it may concern:    The patient Sonny Vivar, : 1965, may work according to his usual work schedule and he is using his portable oxygen via a nasal canula at 3 liters per minute (L/min) at rest and at 6 liters per minute (L/min) while ambulating. The goal oxygen saturation is 88-92%.    Let me know if you have any questions.    Genna Balderas MD                  You were here for evaluation of weakness BLE  You stated that you have chronic pain mostly in the right knee and right hip  Tylenol frequency increased to 3X day and Voltaren gel topical ordered; apply to right knee and right hip 3x day  Follow up with orthopedics for evaluation of right knee and hip pain  Bloodwork to be done as ordered  Return to office in 2 weeks if no improvement

## 2023-06-07 NOTE — PROGRESS NOTES
SUBJECTIVE:                                                    Sonny Vivar is a 51 year old male who presents to clinic today for the following health issues:    Accompanied by his sister.     Bronchitis, CHF:    Patient was on a 5 day course of prednisone and levofloxacin-the latter had to be decreased d/t high INR-INR is OK today.  No reports of recent bleeding.  Breathing is improved from last week.   His weight is almost down 10 lbs from last week.  His blood pressure today is the same as in the past 2-3 weeks, but his sister did state that he was noted to be a bit more dizzy at work and at home, though no recent falls. (Of note, he is on warfarin).  He has been using his nebulizer at about once a day, (and this frequency is actually unchanged from last week).   Leg edema is stable.  No reports of PND or new orthopnea.     Problem list and histories reviewed & adjusted, as indicated.  Additional history: as documented    Patient Active Problem List   Diagnosis     Mental handicap     Pulmonary hypertension (H)     Acquired bronchiectasis (H)     Hypoventilation associated with obesity (H)     Post-phlebitic syndrome     Hyperlipidemia LDL goal <130     Dry eye syndrome     Punctate keratitis due to dry eyes and O2 use     Posterior subcapsular polar senile cataract     Macular scar     Obesity, morbid (more than 100 lbs over ideal weight or BMI > 40) (H)     Long-term (current) use of anticoagulants, INR goal 2.0-3.0     On supplemental oxygen therapy     Trisomy 21 syndrome     Abnormal CT scan of lung     Recurrent pulmonary embolism (H)     Anisometropia     Encounter for counseling     Primary hypercoagulable state (H)     Hypertension, benign     Pulmonary embolism and infarction (H)     Venous (peripheral) insufficiency     Obstructive sleep apnea     Encounter for counseling     Dependence on supplemental oxygen     Bronchiectasis (H)     Long-term (current) use of anticoagulants [Z79.01]      Idiopathic gout, unspecified chronicity, unspecified site     Gastroesophageal reflux disease, esophagitis presence not specified     Chronic atrial fibrillation (H)     Bilateral lower extremity edema     Deep vein thrombosis (DVT) of other vein of lower extremity (H)     Acute on chronic systolic heart failure (H)     Advance care planning     Health Care Home     BP check     Acute on chronic respiratory failure with hypoxia and hypercapnia (H)     Right-sided heart failure (H)     Venous stasis ulcers, unspecified laterality     Past Surgical History:   Procedure Laterality Date     SURGICAL HISTORY OF -       thoractomy for lung infection     THORACIC SURGERY  Feb 1995    Done at [facility]       Social History   Substance Use Topics     Smoking status: Never Smoker     Smokeless tobacco: Never Used      Comment: Lives in smoke free household     Alcohol use No     Family History   Problem Relation Age of Onset     HEART DISEASE Mother      Unknown/Adopted No family hx of      CANCER No family hx of      DIABETES No family hx of      Hypertension No family hx of      CEREBROVASCULAR DISEASE No family hx of      Thyroid Disease No family hx of      Glaucoma No family hx of      Macular Degeneration No family hx of          Current Outpatient Prescriptions   Medication Sig Dispense Refill     levofloxacin (LEVAQUIN) 250 MG tablet Take 1 tablet (250 mg) by mouth daily For 7 days, starting 8.12.17 7 tablet 0     order for DME Tubing for nebulizer 1 Units 0     warfarin (COUMADIN) 3 MG tablet Take 3 mg valentine,tu,th and 1.5 mg m,w,f,sa 150 tablet 1     order for DME Equipment being ordered:1 pair (2 units) of knee high compression stockings, 10-15mm Hg, to be worn in the daytime only on both legs, closed-toe. 2 Units 0     bumetanide (BUMEX) 2 MG tablet Take 1 tablet (2 mg) by mouth 2 times daily Take 1.5 tablets (3 mg) by mouth 2 times daily (Patient taking differently: Take 2 mg by mouth 2 times daily AS of  8/9/2017 patient takes 1.5 tablets 2 times a day.    Take 1.5 tablets (3 mg) by mouth 2 times daily) 180 tablet 1     acetaZOLAMIDE (DIAMOX) 250 MG tablet Take 1 tablet (250 mg) by mouth daily 90 tablet 0     sildenafil (REVATIO/VIAGRA) 20 MG tablet Take 1 tablet (20 mg) by mouth 3 times daily 2/22/2017 patient takes one tablet 3 times daily 180 tablet 1     potassium chloride (KLOR-CON) 20 MEQ Packet Take 20 mEq by mouth 2 times daily dispense tablets not packets. 180 tablet 0     omeprazole (PRILOSEC) 40 MG capsule TAKE ONE CAPSULE BY MOUTH DAILY. TAKE 30-60 MINUTES BEFORE A MEAL 90 capsule 3     amoxicillin (AMOXIL) 500 MG capsule TAKE ONE CAPSULE BY MOUTH THREE TIMES DAILY. 2 WEEKS ON, THEN 2 WEEKS OFF 42 capsule 1     simvastatin (ZOCOR) 20 MG tablet TAKE 1 TABLET(20 MG) BY MOUTH 3 TIMES A WEEK 39 tablet 3     allopurinol (ZYLOPRIM) 300 MG tablet Take 1 tablet (300 mg) by mouth daily 7 tablet 0     BETA BLOCKER NOT PRESCRIBED, INTENTIONAL, Beta Blocker not prescribed intentionally due to Evidence of fluid overload or volume depletion  0     ACE/ARB NOT PRESCRIBED, INTENTIONAL, Please choose reason not prescribed, below       order for DME Equipment being ordered:   Home Oxygen (same home and transportable oxygen tanks and equipment which the patient currently has, as of: February 26, 2017).  3 liters per minute of oxygen at rest and on ambulaton via nasal canula.  Continuous.  Need for 99 months.    Please fax this order, along with Dr Balderas' last OV note from 2.22.17, to: [facility]: fax: [phone]. 1 Device 0     acetaminophen (TYLENOL) 325 MG tablet Take 325-650 mg by mouth every 6 hours as needed for mild pain       multivitamin, therapeutic with minerals (MULTI-VITAMIN) TABS Take 2 tablets by mouth daily        ipratropium - albuterol 0.5 mg/2.5 mg/3 mL (DUONEB) 0.5-2.5 (3) MG/3ML nebulization INHALE 1 VIAL BY NEBULIZATION EVERY 6 HOURS AS NEEDED. USE FOR INCREASED COUGH  AND/OR CONGESTION 360 mL 1     ORDER FOR DME Equipment being ordered: Tubing for nebulizer and accessories. 1 Units 1     ORDER FOR DME Dispense one handheld pulse oximeter for home use.  Check oxygen saturation daily.  Notify primary MD clinic if oxygen regularly <90% or <88% at any time, and increase supplemental oyxgen. 1 Device 0     Respiratory Therapy Supplies (NEBULIZER/ADULT MASK) KIT 1 Units 4 times daily. 1 kit 0     ORDER FOR DME 3 L continuous.           Reviewed and updated as needed this visit by clinical staff       Reviewed and updated as needed this visit by Provider       ==============================================================  ROS:  Constitutional, HEENT, cardiovascular, pulmonary, GI, , musculoskeletal, neuro, skin, endocrine and psych systems are negative, except as otherwise noted.        OBJECTIVE:                                                    BP 96/67 (BP Location: Right arm, Patient Position: Chair, Cuff Size: Adult Regular)  Pulse 85  Temp 96.9  F (36.1  C) (Oral)  Wt 191 lb 9.6 oz (86.9 kg)  SpO2 94%  BMI 37.42 kg/m2  Body mass index is 37.42 kg/(m^2).     GENERAL APPEARANCE: healthy, alert and in no distress; on O2 via NC.  EYES: Eyes grossly normal to inspection, and conjunctivae and sclerae normal  HENT: head normocephalic and atraumatic and mouth without ulcers or lesions, oropharynx,no throat erythema,  and oral mucous membranes moist  NECK: no noticeable adenopathy, no asymmetry, masses, or scars    RESP:   No wheezes (as opposed to last week)  CV: regular rate and rhythm, normal S1 S2, no S3 or S4, no murmur, click or rub, +1-+2 pitting b/l pedal peripheral edema with chronic venous stasis changes [same as the 6.21.17 exam]  ABDOMEN: soft, nontender, no hepatosplenomegaly, no masses and bowel sounds normal  MS: no musculoskeletal defects are noted and gait is age appropriate without ataxia  SKIN:   +2 b/l lower extremity edema  [wearing compression  stockings]  o/w, no suspicious lesions or rashes  NEURO: mentation intact and speech normal  PSYCH: mentation appears normal and affect normal/bright.     Results for orders placed or performed in visit on 08/16/17   ALT   Result Value Ref Range    ALT 20 0 - 70 U/L   LDL cholesterol direct   Result Value Ref Range    LDL Cholesterol Direct 54 <100 mg/dL   CBC with platelets and differential   Result Value Ref Range    WBC 7.1 4.0 - 11.0 10e9/L    RBC Count 4.71 4.4 - 5.9 10e12/L    Hemoglobin 14.2 13.3 - 17.7 g/dL    Hematocrit 44.6 40.0 - 53.0 %    MCV 95 78 - 100 fl    MCH 30.1 26.5 - 33.0 pg    MCHC 31.8 31.5 - 36.5 g/dL    RDW 23.4 (H) 10.0 - 15.0 %    Platelet Count 117 (L) 150 - 450 10e9/L    % Neutrophils 76.0 %    % Lymphocytes 14.0 %    % Monocytes 9.0 %    % Eosinophils 1.0 %    Absolute Neutrophil 5.4 1.6 - 8.3 10e9/L    Absolute Lymphocytes 1.0 0.8 - 5.3 10e9/L    Absolute Monocytes 0.6 0.0 - 1.3 10e9/L    Absolute Eosinophils 0.1 0.0 - 0.7 10e9/L    Anisocytosis Marked     Polychromasia Slight Increase     Macrocytes Present     Reactive Lymphs Present     Diff Method Automated Method    BNP-N terminal pro   Result Value Ref Range    N-Terminal Pro Bnp 3557 (H) 0 - 125 pg/mL   Basic metabolic panel   Result Value Ref Range    Sodium 139 133 - 144 mmol/L    Potassium 3.6 3.4 - 5.3 mmol/L    Chloride 99 94 - 109 mmol/L    Carbon Dioxide 35 (H) 20 - 32 mmol/L    Anion Gap 5 3 - 14 mmol/L    Glucose 75 70 - 99 mg/dL    Urea Nitrogen 34 (H) 7 - 30 mg/dL    Creatinine 1.47 (H) 0.66 - 1.25 mg/dL    GFR Estimate 50 (L) >60 mL/min/1.7m2    GFR Estimate If Black 61 >60 mL/min/1.7m2    Calcium 7.9 (L) 8.5 - 10.1 mg/dL     Pro-BNP slightly up from last week but given that all other respiratory and CHF parameters have improved suspect this is some mild NORMA.    ASSESSMENT/PLAN:                                                        ICD-10-CM    1. Right-sided heart failure (H) I50.9 ALT     LDL cholesterol direct      CBC with platelets and differential     BNP-N terminal pro     Basic metabolic panel   2. Acute on chronic respiratory failure with hypoxia and hypercapnia (H) J96.21 CBC with platelets and differential    J96.22      (I50.9) Right-sided heart failure (H)  (primary encounter diagnosis)  Comment: improved  Plan:      See results message-continue current bumex at 3mg bid and return to clinic in a month.  ALT, LDL cholesterol direct, CBC with platelets        and differential, BNP-N terminal pro            (J96.21,  J96.22) Acute on chronic respiratory failure with hypoxia and hypercapnia (H)  Comment: bronchitis improved, almost resolved-INR normalized on the lower levofloxacin dose    Plan: CBC with platelets and differential        See results message-continue current bumex at 3mg bid and return to clinic in a month.     Patient Instructions   We will advise the next visit based on the labs, and if we need to change the bumex dose.   Continue the Bumex at 3 mg twice a day for now and complete the antibiotics course.                      Genna Balderas MD  [facility]   none